# Patient Record
Sex: FEMALE | Race: WHITE | NOT HISPANIC OR LATINO | URBAN - METROPOLITAN AREA
[De-identification: names, ages, dates, MRNs, and addresses within clinical notes are randomized per-mention and may not be internally consistent; named-entity substitution may affect disease eponyms.]

---

## 2017-04-06 ENCOUNTER — EMERGENCY (EMERGENCY)
Facility: HOSPITAL | Age: 57
LOS: 1 days | Discharge: HOME | End: 2017-04-06
Attending: EMERGENCY MEDICINE | Admitting: EMERGENCY MEDICINE
Payer: COMMERCIAL

## 2017-04-06 VITALS
TEMPERATURE: 98 F | DIASTOLIC BLOOD PRESSURE: 93 MMHG | SYSTOLIC BLOOD PRESSURE: 147 MMHG | HEIGHT: 66 IN | WEIGHT: 166.89 LBS | OXYGEN SATURATION: 99 % | HEART RATE: 72 BPM | RESPIRATION RATE: 16 BRPM

## 2017-04-06 VITALS
HEART RATE: 79 BPM | TEMPERATURE: 98 F | SYSTOLIC BLOOD PRESSURE: 153 MMHG | OXYGEN SATURATION: 100 % | RESPIRATION RATE: 16 BRPM | DIASTOLIC BLOOD PRESSURE: 93 MMHG
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
## 2017-04-06 DIAGNOSIS — S50.11XA CONTUSION OF RIGHT FOREARM, INITIAL ENCOUNTER: ICD-10-CM

## 2017-04-06 DIAGNOSIS — S69.91XA UNSPECIFIED INJURY OF RIGHT WRIST, HAND AND FINGER(S), INITIAL ENCOUNTER: ICD-10-CM
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
## 2017-04-06 DIAGNOSIS — Z79.899 OTHER LONG TERM (CURRENT) DRUG THERAPY: ICD-10-CM

## 2017-04-06 DIAGNOSIS — S63.501A UNSPECIFIED SPRAIN OF RIGHT WRIST, INITIAL ENCOUNTER: ICD-10-CM
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
## 2017-04-06 PROCEDURE — 73110 X-RAY EXAM OF WRIST: CPT | Mod: 26,RT

## 2017-04-06 PROCEDURE — 73090 X-RAY EXAM OF FOREARM: CPT | Mod: 26,LT

## 2017-04-06 PROCEDURE — 73070 X-RAY EXAM OF ELBOW: CPT

## 2017-04-06 PROCEDURE — 73110 X-RAY EXAM OF WRIST: CPT

## 2017-04-06 PROCEDURE — 73070 X-RAY EXAM OF ELBOW: CPT | Mod: 26,RT

## 2017-04-06 PROCEDURE — 99284 EMERGENCY DEPT VISIT MOD MDM: CPT | Mod: 25

## 2017-04-06 PROCEDURE — 73090 X-RAY EXAM OF FOREARM: CPT

## 2017-04-06 RX ORDER — ACETAMINOPHEN 500 MG
650 TABLET ORAL ONCE
Qty: 0 | Refills: 0 | Status: COMPLETED | OUTPATIENT
Start: 2017-04-06 | End: 2017-04-06

## 2017-04-06 RX ADMIN — Medication 650 MILLIGRAM(S): at 07:54

## 2017-04-06 RX ADMIN — Medication 650 MILLIGRAM(S): at 09:15

## 2017-04-06 NOTE — ED PROVIDER NOTE - PLAN OF CARE
Healthy 56 y F with h/o gastric bypass had mechanical trip and fall onto outstretched R hand early this morning, isolated pain and swelling to right distal forearm. Plan: pain control, xrays

## 2017-04-06 NOTE — ED ADULT NURSE NOTE - OBJECTIVE STATEMENT
56 year old female patient presents to the ED A&Ox3. Patient complains of discomfort in the lower forearm of her right hand post fall in the middle of the night. Sensory intact, cap refill less than 3 seconds, strong pulses. Right lateral forearm was tender to touch, mild swelling and redness. No abrasion or laceration, obvious deformity.  Pt denies tingling and numbness, N/V/D, HA, dizziness. Pt has no past medical history, does not take daily medications. Pt is ambulatory. Will continue to monitor. 56 year old female patient presents ambulatory to the ED A&Ox3. Patient complains of discomfort in the lower forearm of her right hand post fall in the middle of the night. Sensory intact, cap refill less than 3 seconds, strong pulses. Right lateral forearm was tender to touch, mild swelling and redness. No abrasion or laceration, obvious deformity. Pt has no past medical history, does not take daily medications. Pt is ambulatory. Will continue to monitor. Patient denies SOB and CP, no N/v/D, afebrile in ED, no headache, no lightheadedness/dizziness, no weakness, no abdominal pain or tenderness, no numbness or tingling, no syncope, no cough, no URI.

## 2017-04-06 NOTE — ED PROCEDURE NOTE - NS ED PERI NEURO NEG
Post-application: Motor, sensory, and vascular responses intact in the injured extremity./The patient/caregiver verbalized understanding of how to care for the injured extremity with splint

## 2017-04-06 NOTE — ED PROVIDER NOTE - OBJECTIVE STATEMENT
Patient is 56 y F with PMH gastric bypass  presenting with R wrist pain after tripping over her dog in the middle of the night while getting up to go to bathroom, no head trauma, no LOC, no n/v. Has isolated pain in R wrist, no numbness weakness or tingling.     PMD: Baljit Burgess  ROS: Denies fever, palpitations, chills, recent sickness, HA, vision changes, cough, SOB, chest pain, abdominal pain, n/v/d/c, dysuria, hematuria, rash, sick contacts, and recent travel. Patient is 56 y F with PMH gastric bypass  presenting with R distal forearm pain and bruising after tripping over her dog in the middle of the night while getting up to go to bathroom, no head trauma, no LOC, no n/v. Has isolated pain in R wrist, no numbness weakness or tingling.     PMD: Baljit Burgess  ROS: Denies fever, palpitations, chills, recent sickness, HA, vision changes, cough, SOB, chest pain, abdominal pain, n/v/d/c, dysuria, hematuria, rash, sick contacts, and recent travel.

## 2017-04-06 NOTE — ED PROVIDER NOTE - PROGRESS NOTE DETAILS
ATTD: Dr. Ford - patient endorsed to me by Dr. Conway. pain better. xray with no acute fracture. placed in ACE. patient feels better. instruted to follow up with ortho. instructions for return if worsen given.

## 2017-04-06 NOTE — ED PROVIDER NOTE - CARE PLAN
Instructions for follow-up, activity and diet:	Healthy 56 y F with h/o gastric bypass had mechanical trip and fall onto outstretched R hand early this morning, isolated pain and swelling to right distal forearm. Plan: pain control, xrays Principal Discharge DX:	Wrist sprain, right, initial encounter  Instructions for follow-up, activity and diet:	Healthy 56 y F with h/o gastric bypass had mechanical trip and fall onto outstretched R hand early this morning, isolated pain and swelling to right distal forearm. Plan: pain control, xrays

## 2017-04-06 NOTE — ED PROCEDURE NOTE - NS ED PERI VASCULAR NEG
fingers/toes warm to touch/no cyanosis of extremity/capillary refill time < 2 seconds/no paresthesia/no swelling

## 2017-04-06 NOTE — ED PROVIDER NOTE - PHYSICAL EXAMINATION
Gen: NAD, AOx3  Head: NCAT  HEENT: PERRL, oral mucosa moist, normal conjunctiva  Lung: CTAB, no respiratory distress  CV: rrr, no murmurs, Normal perfusion  Abd: soft, NTND, no CVA tenderness  MSK: No edema, R wrist with scaphoid tenderness, R distal forearm with bruising, swelling and TTP, distal extremities neurovascularly intact with strong pulses  Neuro: No focal neurologic deficits  Skin: No rash   Psych: normal affect

## 2017-04-06 NOTE — ED PROCEDURE NOTE - CPROC ED POST PROC CARE GUIDE1
Instructed patient/caregiver to follow-up with primary care physician./Verbal/written post procedure instructions were given to patient/caregiver./Instructed patient/caregiver regarding signs and symptoms of infection./Elevate the injured extremity as instructed./Keep the cast/splint/dressing clean and dry.

## 2018-12-13 ENCOUNTER — APPOINTMENT (OUTPATIENT)
Dept: ORTHOPEDIC SURGERY | Facility: CLINIC | Age: 58
End: 2018-12-13
Payer: COMMERCIAL

## 2018-12-13 VITALS — DIASTOLIC BLOOD PRESSURE: 82 MMHG | SYSTOLIC BLOOD PRESSURE: 119 MMHG | HEART RATE: 82 BPM

## 2018-12-13 VITALS — HEIGHT: 66 IN | BODY MASS INDEX: 28.93 KG/M2 | WEIGHT: 180 LBS

## 2018-12-13 DIAGNOSIS — M75.81 OTHER SHOULDER LESIONS, RIGHT SHOULDER: ICD-10-CM

## 2018-12-13 DIAGNOSIS — Z78.9 OTHER SPECIFIED HEALTH STATUS: ICD-10-CM

## 2018-12-13 PROCEDURE — 99203 OFFICE O/P NEW LOW 30 MIN: CPT | Mod: 25

## 2018-12-13 PROCEDURE — 73030 X-RAY EXAM OF SHOULDER: CPT | Mod: RT

## 2018-12-13 PROCEDURE — 20610 DRAIN/INJ JOINT/BURSA W/O US: CPT | Mod: RT

## 2018-12-13 RX ORDER — PANTOPRAZOLE SODIUM 20 MG/1
TABLET, DELAYED RELEASE ORAL
Refills: 0 | Status: ACTIVE | COMMUNITY

## 2018-12-13 RX ORDER — ENALAPRIL MALEATE 5 MG/1
TABLET ORAL
Refills: 0 | Status: ACTIVE | COMMUNITY

## 2020-12-03 ENCOUNTER — APPOINTMENT (OUTPATIENT)
Dept: ORTHOPEDIC SURGERY | Facility: CLINIC | Age: 60
End: 2020-12-03
Payer: COMMERCIAL

## 2020-12-03 VITALS — BODY MASS INDEX: 27.48 KG/M2 | HEIGHT: 66 IN | WEIGHT: 171 LBS

## 2020-12-03 DIAGNOSIS — M25.551 PAIN IN RIGHT HIP: ICD-10-CM

## 2020-12-03 DIAGNOSIS — K21.9 GASTRO-ESOPHAGEAL REFLUX DISEASE W/OUT ESOPHAGITIS: ICD-10-CM

## 2020-12-03 DIAGNOSIS — R03.0 ELEVATED BLOOD-PRESSURE READING, W/OUT DIAGNOSIS OF HYPERTENSION: ICD-10-CM

## 2020-12-03 PROCEDURE — 99072 ADDL SUPL MATRL&STAF TM PHE: CPT

## 2020-12-03 PROCEDURE — 20610 DRAIN/INJ JOINT/BURSA W/O US: CPT | Mod: RT

## 2020-12-03 PROCEDURE — 73502 X-RAY EXAM HIP UNI 2-3 VIEWS: CPT | Mod: RT

## 2020-12-03 PROCEDURE — 99214 OFFICE O/P EST MOD 30 MIN: CPT | Mod: 25

## 2020-12-03 NOTE — PHYSICAL EXAM
[de-identified] : Patient is WDWN, alert, and in no acute distress. Breathing is unlabored. She is grossly oriented to person, place, and time. \par \par Right Hip: No discoloration or deformity\par Tender along the greater  trochanter,\par  pain with flexion >90, abduction >60 and adduction >50, pain with internal and external rotation  \par \par Left Hip: Full ROM without pain\par \par Right Shoulder: \par Inspection/ Palpation: there is moderate deltoid tenderness. No discoloration or deformities. \par Range of Motion: limited due to pain:flex 70, abd 60, int rot gluteal area\par Stability: no joint instability on provocative testing \par   [de-identified] : AP and lateral views of the right hip and pelvis were obtained and revealed no abnormalities. No acute fracture. No dislocation. Cartilage spaces are maintained. Phleboliths noted.\par

## 2020-12-03 NOTE — DISCUSSION/SUMMARY
[de-identified] : Dx. Right Trochanteric Bursitis\par \par The patient wishes to proceed with a cortisone injection today. Under sterile precautions, an injection of 5cc 1% lidocaine without epinephrine and 0.5cc Kenalog 40mg, 0.5cc Dexamethasone was administered into the right gluteal insertion on the greater trochanter without complication. The patient tolerated the procedure well.  \par \par Follow Up as needed.

## 2020-12-03 NOTE — END OF VISIT
[FreeTextEntry3] : I, Levi Tatum MD, ordering physician, have read and attest that all the information, medical decision making and discharge instructions within are true and accurate.

## 2020-12-03 NOTE — ADDENDUM
[FreeTextEntry1] : I, Ivonne Mayfield wrote this note acting as a scribe for Dr. Levi Tatum on Dec 03, 2020.

## 2020-12-03 NOTE — HISTORY OF PRESENT ILLNESS
[de-identified] : 58 year old female presents today with right shoulder pain x 2 weeks. She states she twisted her shoulder getting out of the bathtub while on vacation in [place]. Her symptoms were of immediate onset. She developed bruising over lateral humerus and swelling over the right shoulder. She went to urgent care following the injury where she was diagnosed with a possible rotator cuff tear. She reports waking up at night due to pain.She denies numbness. No shoulder pain prior to incident.\par \par She returns c/o right hip pain. She states that she twisted her hip a few weeks ago and has been experiencing pain. She reports taking Tylenol.

## 2020-12-05 ENCOUNTER — EMERGENCY (EMERGENCY)
Facility: HOSPITAL | Age: 60
LOS: 1 days | Discharge: HOME | End: 2020-12-05
Attending: EMERGENCY MEDICINE
Payer: COMMERCIAL

## 2020-12-05 VITALS
RESPIRATION RATE: 20 BRPM | SYSTOLIC BLOOD PRESSURE: 156 MMHG | DIASTOLIC BLOOD PRESSURE: 95 MMHG | TEMPERATURE: 98 F | OXYGEN SATURATION: 99 % | HEART RATE: 79 BPM | WEIGHT: 169.76 LBS | HEIGHT: 66 IN

## 2020-12-05 DIAGNOSIS — Z98.84 BARIATRIC SURGERY STATUS: Chronic | ICD-10-CM

## 2020-12-05 PROCEDURE — 99283 EMERGENCY DEPT VISIT LOW MDM: CPT

## 2020-12-05 RX ORDER — DIAZEPAM 5 MG
5 TABLET ORAL ONCE
Refills: 0 | Status: DISCONTINUED | OUTPATIENT
Start: 2020-12-05 | End: 2020-12-05

## 2020-12-05 RX ORDER — CELECOXIB 200 MG/1
1 CAPSULE ORAL
Qty: 28 | Refills: 0
Start: 2020-12-05 | End: 2020-12-18

## 2020-12-05 RX ORDER — OXYCODONE HYDROCHLORIDE 5 MG/1
5 TABLET ORAL ONCE
Refills: 0 | Status: DISCONTINUED | OUTPATIENT
Start: 2020-12-05 | End: 2020-12-05

## 2020-12-05 RX ORDER — IBUPROFEN 200 MG
600 TABLET ORAL ONCE
Refills: 0 | Status: COMPLETED | OUTPATIENT
Start: 2020-12-05 | End: 2020-12-05

## 2020-12-05 RX ORDER — DIAZEPAM 5 MG
2 TABLET ORAL
Qty: 15 | Refills: 0
Start: 2020-12-05

## 2020-12-05 RX ORDER — LIDOCAINE 4 G/100G
1 CREAM TOPICAL ONCE
Refills: 0 | Status: COMPLETED | OUTPATIENT
Start: 2020-12-05 | End: 2020-12-05

## 2020-12-05 RX ADMIN — OXYCODONE HYDROCHLORIDE 5 MILLIGRAM(S): 5 TABLET ORAL at 11:49

## 2020-12-05 RX ADMIN — Medication 600 MILLIGRAM(S): at 10:23

## 2020-12-05 RX ADMIN — Medication 5 MILLIGRAM(S): at 10:23

## 2020-12-05 RX ADMIN — Medication 600 MILLIGRAM(S): at 10:30

## 2020-12-05 RX ADMIN — LIDOCAINE 1 PATCH: 4 CREAM TOPICAL at 10:22

## 2020-12-05 NOTE — ED PROVIDER NOTE - CONSTITUTIONAL, MLM
normal... Well appearing, awake, alert, oriented to person, place, time/situation and in no apparent distress however sitting uncomfortable in chair.

## 2020-12-05 NOTE — ED PROVIDER NOTE - NSFOLLOWUPINSTRUCTIONS_ED_ALL_ED_FT
IMPORTANT INSTRUCTIONS FROM Dr. RODRIGUEZ:    Please follow up with your personal medical doctor in 24-48 hours.   Bring results from today to your visit.    Lidocaine 4% patch over the counter as needed - 12 hr on - 12 hr off.  Take celecoxib as prescribed for pain.  You can also take valium for muscle relaxing additionally if this is not enough. This will make you drowsy.    Make an appt with the spine center within a few days. Call [phone] for an appt.     If you were advised to take any medications - be sure to review the package insert.    If your symptoms change, get worse or if you have any new symptoms, come to the ER right away.  If you have any questions, call the ER at the phone number on this page.

## 2020-12-05 NOTE — ED PROVIDER NOTE - OBJECTIVE STATEMENT
60 female htn/hld/recent cortisone inj on thurs R hip bursitis now w mod to severe L side lbp cont sharp not better w tyl 975. able to amb. No numbness / tingling / urinary incont or retention / bowel probs / ivdu / fevers. no trauma.

## 2020-12-05 NOTE — ED PROVIDER NOTE - PATIENT PORTAL LINK FT
You can access the FollowMyHealth Patient Portal offered by [facility] by registering at the following website: [email or web address]. By joining [facility]’s FollowMyHealth portal, you will also be able to view your health information using other applications (apps) compatible with our system.

## 2020-12-21 ENCOUNTER — APPOINTMENT (OUTPATIENT)
Dept: ORTHOPEDIC SURGERY | Facility: CLINIC | Age: 60
End: 2020-12-21
Payer: COMMERCIAL

## 2020-12-21 VITALS — BODY MASS INDEX: 27.48 KG/M2 | WEIGHT: 171 LBS | HEIGHT: 66 IN

## 2020-12-21 DIAGNOSIS — M54.5 LOW BACK PAIN: ICD-10-CM

## 2020-12-21 PROCEDURE — 72100 X-RAY EXAM L-S SPINE 2/3 VWS: CPT

## 2020-12-21 PROCEDURE — 99214 OFFICE O/P EST MOD 30 MIN: CPT | Mod: 25

## 2020-12-21 PROCEDURE — 96372 THER/PROPH/DIAG INJ SC/IM: CPT

## 2020-12-21 PROCEDURE — 99072 ADDL SUPL MATRL&STAF TM PHE: CPT

## 2020-12-21 NOTE — DISCUSSION/SUMMARY
[de-identified] : The underlying pathophysiology was reviewed with the patient. Treatment options were discussed including; observation, NSAIDS, analgesics, bracing, injection(s), physical therapy, and surgical intervention. \par \par The patient was given a 30mg toradol injection in her right gluteal area\par She was given a script for a lumbar spine MRI to rule out a pathologic fracture..\par She was advised to call the office after the MRI is done to discuss the results.\par Follow Up  in one week.

## 2020-12-21 NOTE — PHYSICAL EXAM
[de-identified] : Patient is WDWN, alert, and in no acute distress. Breathing is unlabored. She is grossly oriented to person, place, and time. \par \par Right Hip: No discoloration or deformity\par Tender along the greater  trochanter,\par  pain with flexion >90, abduction >60 and adduction >50, pain with internal and external rotation  \par \par Left Hip: Full ROM without pain\par \par Right Shoulder: \par Inspection/ Palpation: there is moderate deltoid tenderness. No discoloration or deformities. \par Range of Motion: limited due to pain:flex 70, abd 60, int rot gluteal area\par Stability: no joint instability on provocative testing \par   [de-identified] : AP and lateral views of the right hip and pelvis were obtained and revealed no abnormalities. No acute fracture. No dislocation. Cartilage spaces are maintained. Phleboliths noted.\par \par Lumbar spine Xrays done today reveal an L1 compression fracture.\par

## 2020-12-21 NOTE — HISTORY OF PRESENT ILLNESS
[de-identified] : 58 year old female presents today with lumbar back pain x2.5 weeks. She reports being unable to get out of bed 2 days her last visit here in which she had a cortisone injection in right trochanteric bursa. Denies numbess/tingling in the toes. Pt went to the [facility] ED 12-05-20 and urgent care in [facility] 12-06-20 for this pain and was diagnosed with muscle spasms. Pt has tried gabapentin, muscle relaxer, and narcotics for pain relief which helped her initially, but do not help anymore. Her pain is worse in the morning, rated 8/10 and after walking around stays at 7/10. \par The pain today is localized over the right and left lumbar paraspinal musculature. She denies any radiating pain or numbness in her lower extremities

## 2020-12-27 ENCOUNTER — TRANSCRIPTION ENCOUNTER (OUTPATIENT)
Age: 60
End: 2020-12-27

## 2020-12-28 RX ORDER — METHYLPREDNISOLONE 4 MG/1
4 TABLET ORAL
Qty: 1 | Refills: 0 | Status: ACTIVE | COMMUNITY
Start: 2020-12-28 | End: 1900-01-01

## 2021-02-04 ENCOUNTER — APPOINTMENT (OUTPATIENT)
Dept: ORTHOPEDIC SURGERY | Facility: CLINIC | Age: 61
End: 2021-02-04
Payer: COMMERCIAL

## 2021-02-04 DIAGNOSIS — M70.61 TROCHANTERIC BURSITIS, RIGHT HIP: ICD-10-CM

## 2021-02-04 DIAGNOSIS — S32.010A WEDGE COMPRESSION FRACTURE OF FIRST LUMBAR VERTEBRA, INITIAL ENCOUNTER FOR CLOSED FRACTURE: ICD-10-CM

## 2021-02-04 PROCEDURE — 99213 OFFICE O/P EST LOW 20 MIN: CPT | Mod: 25

## 2021-02-04 PROCEDURE — 20610 DRAIN/INJ JOINT/BURSA W/O US: CPT | Mod: RT

## 2021-02-04 PROCEDURE — 99072 ADDL SUPL MATRL&STAF TM PHE: CPT

## 2021-02-04 RX ORDER — IBUPROFEN 600 MG/1
600 TABLET, FILM COATED ORAL
Qty: 30 | Refills: 0 | Status: ACTIVE | COMMUNITY
Start: 2021-02-04 | End: 1900-01-01

## 2021-02-05 PROBLEM — M70.61 GREATER TROCHANTERIC BURSITIS OF RIGHT HIP: Status: ACTIVE | Noted: 2020-12-03

## 2021-02-05 NOTE — HISTORY OF PRESENT ILLNESS
[de-identified] : 58 year old female presents today for follow up of lumbar back pain evaluated on 12/21/2020. Pt reports the pain is 7/10, has not improved and has radiated down her legs.\par Pt reports Toradol injection improved symtpoms. Pt has used all her muscle relaxers.\par Pt was unable to get out of bed 2 days after receiving a cortisone injection in right trochanteric bursa on 12/3/2020. Pt went to the [facility] ED 12-05-20 and urgent care in [facility] 12-06-20 for this pain and was diagnosed with muscle spasms.

## 2021-02-05 NOTE — ADDENDUM
[FreeTextEntry1] : I, Ivonne Mayfield, Héctor Leo wrote this note acting as a scribe for Dr. Levi Tatum on Feb 04, 2021.

## 2021-02-05 NOTE — DISCUSSION/SUMMARY
[de-identified] : The underlying pathophysiology was reviewed with the patient. Treatment options were discussed including; observation, NSAIDS, analgesics, bracing, injection(s), physical therapy, and surgical intervention. \par \par MRI imaging was normal.\par Discussed going to physical therapy\par Discussed doing yoga, acupuncture, stretching exercises \par A script Ibuprofen 600mg 30 tablets BID were given. \par The patient wishes to proceed with physical therapy. A script was given for her back.\par \par The patient wishes to proceed with a cortisone injection today. Under sterile precautions, an injection of 5cc 1% lidocaine without epinephrine and 0.5cc Kenalog 40mg, 0.5cc Dexamethasone was administered into the right trochanteric  without complication. The patient tolerated the procedure well. (1)\par

## 2021-02-05 NOTE — PHYSICAL EXAM
[de-identified] : Patient is WDWN, alert, and in no acute distress. Breathing is unlabored. She is grossly oriented to person, place, and time. \par \par Right Hip: No discoloration or deformity\par Tender along the greater  trochanter,\par  pain with flexion >90, abduction >60 and adduction >50, pain with internal and external rotation  \par \par Left Hip: Full ROM without pain\par \par Right Shoulder: \par Inspection/ Palpation: there is moderate deltoid tenderness. No discoloration or deformities. \par Range of Motion: limited due to pain:flex 70, abd 60, int rot gluteal area\par Stability: no joint instability on provocative testing \par   [de-identified] : AP and lateral views of the right hip and pelvis were obtained and revealed no abnormalities. No acute fracture. No dislocation. Cartilage spaces are maintained. Phleboliths noted.\par \par Lumbar spine Xrays revealed an L1 compression fracture.\par MRI bone density exam normal.

## 2021-02-08 RX ORDER — CYCLOBENZAPRINE HYDROCHLORIDE 10 MG/1
10 TABLET, FILM COATED ORAL
Qty: 30 | Refills: 0 | Status: ACTIVE | COMMUNITY
Start: 2020-12-08

## 2021-02-08 RX ORDER — PANTOPRAZOLE 40 MG/1
40 TABLET, DELAYED RELEASE ORAL
Qty: 90 | Refills: 0 | Status: ACTIVE | COMMUNITY
Start: 2020-09-10

## 2021-02-08 RX ORDER — IBUPROFEN 600 MG/1
600 TABLET, FILM COATED ORAL
Qty: 30 | Refills: 0 | Status: ACTIVE | COMMUNITY
Start: 2021-02-08 | End: 1900-01-01

## 2021-02-08 RX ORDER — IBUPROFEN 400 MG/1
400 TABLET, FILM COATED ORAL
Qty: 15 | Refills: 0 | Status: ACTIVE | COMMUNITY
Start: 2020-12-05

## 2021-02-08 RX ORDER — ATORVASTATIN CALCIUM 10 MG/1
10 TABLET, FILM COATED ORAL
Qty: 90 | Refills: 0 | Status: ACTIVE | COMMUNITY
Start: 2020-09-30

## 2021-02-08 RX ORDER — AZITHROMYCIN 250 MG/1
250 TABLET, FILM COATED ORAL
Qty: 6 | Refills: 0 | Status: ACTIVE | COMMUNITY
Start: 2020-11-03

## 2021-02-08 RX ORDER — TRAMADOL HYDROCHLORIDE 50 MG/1
50 TABLET, COATED ORAL
Qty: 10 | Refills: 0 | Status: ACTIVE | COMMUNITY
Start: 2020-12-07

## 2021-02-08 RX ORDER — DIAZEPAM 2 MG/1
2 TABLET ORAL
Qty: 15 | Refills: 0 | Status: ACTIVE | COMMUNITY
Start: 2020-12-05

## 2021-03-10 ENCOUNTER — APPOINTMENT (OUTPATIENT)
Dept: ORTHOPEDIC SURGERY | Facility: CLINIC | Age: 61
End: 2021-03-10
Payer: COMMERCIAL

## 2021-03-10 VITALS
HEIGHT: 66 IN | HEART RATE: 97 BPM | BODY MASS INDEX: 27.32 KG/M2 | DIASTOLIC BLOOD PRESSURE: 86 MMHG | WEIGHT: 170 LBS | SYSTOLIC BLOOD PRESSURE: 135 MMHG

## 2021-03-10 PROCEDURE — 73610 X-RAY EXAM OF ANKLE: CPT | Mod: LT

## 2021-03-10 PROCEDURE — 99214 OFFICE O/P EST MOD 30 MIN: CPT

## 2021-03-10 PROCEDURE — 99072 ADDL SUPL MATRL&STAF TM PHE: CPT

## 2021-03-10 NOTE — ADDENDUM
[FreeTextEntry1] : This note was written by Lela Mann on 03/10/2021 acting solely as a scribe for Dr. Shay Lawson.\par \par All medical record entries made by the Scribe were at my, Dr. Shay Lawson, direction and personally dictated by me on 03/10/2021. I have personally reviewed the chart and agree that the record accurately reflects my personal performance of the history, physical exam, assessment and plan.

## 2021-03-10 NOTE — HISTORY OF PRESENT ILLNESS
[de-identified] : 60 year old female presents today with left ankle injury sustained 3/6/21. She inverted her ankle dancing on Saturday in [place]. She was seen in ER the next day and was told that she had fx in the fibula. She was placed in a splint. Has been nonweightbearing since. Presents with splint and in wheelchair. Taking Oxycodone for pain relief.  Reports occasional numbness and tingling in her foot. \par \par The patient's past medical history, past surgical history, medications and allergies were reviewed by me today with the patient and documented accordingly. In addition, the patient's family and social history, which were noncontributory to this visit, were reviewed also.

## 2021-03-10 NOTE — PHYSICAL EXAM
[de-identified] : Oriented to time, place, person\par Mood: Normal\par Affect: Normal\par Appearance: Healthy, well appearing, no acute distress.\par Gait: Antalgic\par Assistive Devices: wheel chair/splint\par \par Left Ankle exam:\par \par Skin: Clean, dry, intact\par Inspection: No obvious malalignment, +lateral ankle swelling, no effusion, ecchymosis to lateral ankle. \par Pulses: 2+ DP/PT pulses \par ROM: normal degrees of dorsiflexion, normal degrees of plantarflexion, normal subtalar motion.\par Tenderness: ++ tenderness over the lateral malleolus, + CFL/ATFL/PTFL pain. No medial malleolus pain, no deltoid ligament pain. No proximal fibular pain. No heel pain.\par Stability: Negative anterior drawer, negative posterior drawer.\par Strength: In tact TA/GS/EHL \par Neuro: In tact to light touch throughout\par Additional tests: Negative syndesmosis squeeze test.  [de-identified] : The following radiographs were ordered and read by me during this patients visit. I reviewed each radiograph in detail with the patient and discussed the findings as highlighted below. \par \par 3 views of the left ankle were obtained today, 03/10/2021, that show spiral fx of distal fibula.  No disruption of the ankle more T's.  No medial injury.

## 2021-03-10 NOTE — DISCUSSION/SUMMARY
[de-identified] : 59 y/o female with left distal fibula spiral fx. \par \par Patient presents with a stable left ankle injury with fracture of the distal fibula.  The does not appear to be any medial injury, or disruption of the syndesmotic ligaments.  We discussed her injury, and stressing of the injury with weightbearing to see if there is any complex medial injury not seen on x-ray imaging.  Repeat imaging required in the next week to 10 days to determine if there is any shift of the talus. \par \par A discussion was had with the patient regarding basic fracture care. Bony union typically requires 4-6 wks of relative rest and immobilization, and symptoms of pain/swelling typically resolve during this time. Complications of fractures can involve malunion, nonunion, and further displacement that may warrant additional treatment. To avoid these complications, it is recommended for frequent radiographic followup to confirm that the fracture is healing appropriately.  \par \par Recommendation; Elevation of the affected limb, utilize ice (20mins at a time 2-3x daily), OTC pain medication (Tylenol/NSAID's as able), CAM boot immobilization, decreased activity/weight bearing. \par \par Follow up 1 week for repeat xrays and to consider physical therapy.

## 2021-03-18 ENCOUNTER — APPOINTMENT (OUTPATIENT)
Dept: ORTHOPEDIC SURGERY | Facility: CLINIC | Age: 61
End: 2021-03-18
Payer: COMMERCIAL

## 2021-03-18 VITALS — TEMPERATURE: 97.2 F

## 2021-03-18 PROCEDURE — 99072 ADDL SUPL MATRL&STAF TM PHE: CPT

## 2021-03-18 PROCEDURE — 99213 OFFICE O/P EST LOW 20 MIN: CPT

## 2021-03-18 PROCEDURE — 73610 X-RAY EXAM OF ANKLE: CPT | Mod: LT

## 2021-03-18 NOTE — DISCUSSION/SUMMARY
[de-identified] : 61 y/o female with left distal fibula spiral fx. \par \par Patient presents with a stable left ankle injury with fracture of the distal fibula.  There does not appear to be any medial injury, or disruption of the syndesmotic ligament and repeat x-ray shows no medial widening despite trial of weightbearing stress test. We discussed continued use of CAM boot, weightbearing, and beginning physical therapy for range of motion therapy as well as early strengthening/conditioning. \par \par Recommendation; BeginPT, Rx given. Continue use of CAM x~4wks. NSAIDs/Ice PRN. \par \par Follow up 4 weeks for repeat imaging.

## 2021-03-18 NOTE — HISTORY OF PRESENT ILLNESS
[de-identified] : 60 year old female presents today for follow up of left ankle fx sustained 3/6/21. She inverted her ankle dancing in [place]. She has been compliant with CAM boot and ambulating via cane. Taking Ibuprofen for pain relief. C/O swelling.  Denies numbness or tingling.

## 2021-03-18 NOTE — REASON FOR VISIT
[Initial Visit] : an initial visit for [FreeTextEntry2] : Left ankle fx  [Follow-Up Visit] : a follow-up visit for

## 2021-03-18 NOTE — PHYSICAL EXAM
[de-identified] : Oriented to time, place, person\par Mood: Normal\par Affect: Normal\par Appearance: Healthy, well appearing, no acute distress.\par Gait: Antalgic\par Assistive Devices: CAM boot/Cane\par \par Left Ankle exam:\par \par Skin: Clean, dry, intact\par Inspection: No obvious malalignment, +lateral ankle swelling, no effusion, ecchymosis to lateral ankle. \par Pulses: 2+ DP/PT pulses \par ROM: normal degrees of dorsiflexion, normal degrees of plantarflexion, normal subtalar motion.\par Tenderness: ++ tenderness over the lateral malleolus, + CFL/ATFL/PTFL pain. No medial malleolus pain, no deltoid ligament pain. No proximal fibular pain. No heel pain.\par Stability: Negative anterior drawer, negative posterior drawer.\par Strength: In tact TA/GS/EHL \par Neuro: In tact to light touch throughout\par Additional tests: Negative syndesmosis squeeze test.  [de-identified] : The following radiographs were ordered and read by me during this patients visit. I reviewed each radiograph in detail with the patient and discussed the findings as highlighted below. \par \par 3 views of the left ankle were obtained today, 03/18/2021, that show non-displaced spiral fx of distal fibula.  No medial injury. No change since last visit.

## 2021-03-18 NOTE — ADDENDUM
[FreeTextEntry1] : This note was written by Lela Mann on 03/18/2021 acting solely as a scribe for Dr. Shay Lawson.\par \par All medical record entries made by the Scribe were at my, Dr. Shay Lawson, direction and personally dictated by me on 03/18/2021. I have personally reviewed the chart and agree that the record accurately reflects my personal performance of the history, physical exam, assessment and plan.

## 2021-04-15 ENCOUNTER — APPOINTMENT (OUTPATIENT)
Dept: ORTHOPEDIC SURGERY | Facility: CLINIC | Age: 61
End: 2021-04-15
Payer: COMMERCIAL

## 2021-04-15 PROCEDURE — 99213 OFFICE O/P EST LOW 20 MIN: CPT

## 2021-04-15 PROCEDURE — 99072 ADDL SUPL MATRL&STAF TM PHE: CPT

## 2021-04-15 PROCEDURE — 73610 X-RAY EXAM OF ANKLE: CPT | Mod: LT

## 2021-04-23 NOTE — HISTORY OF PRESENT ILLNESS
[de-identified] : 60 year old female presents today for follow up of left lateral malleolus fx sustained 3/6/21. She transitioned into regular shoe last week. Attending PT 2 x per week. She inverted her ankle dancing in [place].  Taking Tylenol for pain relief. C/O swelling.  Denies numbness or tingling.

## 2021-04-23 NOTE — ADDENDUM
[FreeTextEntry1] : This note was written by Lela Mann on 04/15/2021 acting solely as a scribe for Dr. Shay Lawson.\par \par All medical record entries made by the Scribe were at my, Dr. Shay Lawson, direction and personally dictated by me on 04/15/2021. I have personally reviewed the chart and agree that the record accurately reflects my personal performance of the history, physical exam, assessment and plan.

## 2021-04-23 NOTE — DISCUSSION/SUMMARY
[de-identified] : 59 y/o female with left distal fibula spiral fx. \par \par Patient is doing well at this time. She has improved pain and ROM of the ankle under the guidance of physical therapy. Clinically, patient still has mild lateral swelling. We discussed with continue PT and icing the swelling should improve over time. \par \par Recommendation; Continue PT. NSAIDs/Ice PRN.  Gradual return to ADLs.\par \par Follow up 6 weeks for final imaging

## 2021-04-23 NOTE — PHYSICAL EXAM
[de-identified] : Oriented to time, place, person\par Mood: Normal\par Affect: Normal\par Appearance: Healthy, well appearing, no acute distress.\par Gait: normal\par Assistive Devices: none\par \par Left Ankle exam:\par \par Skin: Clean, dry, intact\par Inspection: No obvious malalignment, + residual lateral ankle swelling, no effusion\par Pulses: 2+ DP/PT pulses \par ROM:15 degrees of dorsiflexion, 25 degrees of plantarflexion, normal subtalar motion.\par Tenderness: +min tenderness over the lateral malleolus, no CFL/ATFL/PTFL pain. No medial malleolus pain, no deltoid ligament pain. No proximal fibular pain. No heel pain.\par Stability: Negative anterior drawer, negative posterior drawer.\par Strength: In tact TA/GS/EHL \par Neuro: In tact to light touch throughout\par Additional tests: Negative syndesmosis squeeze test.  [de-identified] : The following radiographs were ordered and read by me during this patients visit. I reviewed each radiograph in detail with the patient and discussed the findings as highlighted below. \par \par 3 views of the left ankle were obtained today, 04/15/2021, that show healed non-displaced spiral fx of distal fibula.  No medial injury. No change since last visit.

## 2021-05-27 ENCOUNTER — APPOINTMENT (OUTPATIENT)
Dept: ORTHOPEDIC SURGERY | Facility: CLINIC | Age: 61
End: 2021-05-27
Payer: COMMERCIAL

## 2021-05-27 VITALS
BODY MASS INDEX: 27.32 KG/M2 | SYSTOLIC BLOOD PRESSURE: 135 MMHG | HEIGHT: 66 IN | DIASTOLIC BLOOD PRESSURE: 86 MMHG | HEART RATE: 97 BPM | WEIGHT: 170 LBS

## 2021-05-27 DIAGNOSIS — S82.65XD NONDISPLACED FRACTURE OF LATERAL MALLEOLUS OF LEFT FIBULA, SUBSEQUENT ENCOUNTER FOR CLOSED FRACTURE WITH ROUTINE HEALING: ICD-10-CM

## 2021-05-27 PROCEDURE — 99213 OFFICE O/P EST LOW 20 MIN: CPT

## 2021-05-27 PROCEDURE — 99072 ADDL SUPL MATRL&STAF TM PHE: CPT

## 2021-05-27 PROCEDURE — 73610 X-RAY EXAM OF ANKLE: CPT | Mod: LT

## 2021-05-27 NOTE — ADDENDUM
[FreeTextEntry1] : This note was written by Lela Mann on 05/27/2021 acting solely as a scribe for Dr. Shay Lawson.\par \par All medical record entries made by the Scribe were at my, Dr. Shay Lawson, direction and personally dictated by me on 05/27/2021. I have personally reviewed the chart and agree that the record accurately reflects my personal performance of the history, physical exam, assessment and plan.

## 2021-05-27 NOTE — HISTORY OF PRESENT ILLNESS
[de-identified] : 60 year old female presents today for follow up of left lateral malleolus fx sustained 3/6/21. She stopped PT last week and continuing those exercises at home. She inverted her ankle dancing in [place].  Reports significant relief of symptoms and pain at this time.  Presenting in sandals.  Taking Tylenol for pain relief. C/O swelling.  Denies pain, numbness or tingling.

## 2021-05-27 NOTE — DISCUSSION/SUMMARY
[de-identified] : 59 y/o female with left distal fibula spiral fx. \par \par Patient is doing well at this time, with improved pain and ROM of the ankle.  Patient denies any significant pain and completed PT last week. She has returned to ADL's with the occasional use of ankle brace. Clinically, patient still has mild lateral swelling. She may return to light impact loading activity with progression to full activity with no restriction.  \par \par Recommendation; NSAIDs/Ice PRN.  Gradual return to ADLs.\par \par Follow up as needed.

## 2021-05-27 NOTE — PHYSICAL EXAM
[de-identified] : Oriented to time, place, person\par Mood: Normal\par Affect: Normal\par Appearance: Healthy, well appearing, no acute distress.\par Gait: normal\par Assistive Devices: none\par \par Left Ankle exam:\par \par Skin: Clean, dry, intact\par Inspection: No obvious malalignment, + mild residual lateral ankle swelling, no effusion\par Pulses: 2+ DP/PT pulses \par ROM:15 degrees of dorsiflexion, 25 degrees of plantarflexion, normal subtalar motion.\par Tenderness: No tenderness over the lateral malleolus, no CFL/ATFL/PTFL pain. No medial malleolus pain, no deltoid ligament pain. No proximal fibular pain. No heel pain.\par Stability: Negative anterior drawer, negative posterior drawer.\par Strength: In tact TA/GS/EHL \par Neuro: In tact to light touch throughout\par Additional tests: Negative syndesmosis squeeze test.  [de-identified] : The following radiographs were ordered and read by me during this patients visit. I reviewed each radiograph in detail with the patient and discussed the findings as highlighted below. \par \par 3 views of the left ankle were obtained today, 05/27/2021, that show healed non-displaced spiral fx of distal fibula.

## 2021-07-29 NOTE — ED ADULT TRIAGE NOTE - SPO2 (%)
[facility] Adult  Hospitalist Group                                                                                          Hospitalist Progress Note  Israel Cr MD  Answering service: [phone] OR [phone] from in house phone        Date of Service:  2021  NAME:  Nj Wheeler  :  1936  MRN:  [id number]    This documentation was facilitated by a Voice Recognition software and may contain inadvertent typographical errors. Admission Summary:   Nj Wheeler is a 82 y.o. female with right breast cancer s/p R axillary lymph node dissection and excision of right breast implant for adenopathy and mass on  with path + for metastatic melanoma, GERD, dyslipidemia, HTN, TIA and hx malignant melanoma who presents with dyspnea and pleuritic chest pain for the past several days. Interval history / Subjective:   No acute overnight events. Patient notes improvement of the chest pain and shortness of breath. Assessment & Plan:   Acute pulmonary emboli  --CT of the lung showed small burden of bilateral acute versus subacute pleural embolism, new multiple subcentimeter trach right lung nodules which may represent metastatic disease.  -Hemodynamically stable, no oxygen requirement, no right heart strain  -Continue Lovenox 1 mg/kg every 12 hourly. Lovenox is preferred anticoagulants in the setting of cancer (metastatic melanoma) however patient is not inclined to continue with injections, so patient will probably for be discharged on NOACs. Hematology/oncology consulted, will await further recommendation. SIRS (fever and tachycardia) possible etiologies include acute PE, hypovolemia. No definitive source of infection.  -after admission, patient developed tachycardia and Tmax 100.8  -Procalcitonin level <0.05. Lactic acid 0.9. Blood culture in process. UA not done, will order.   Would like to obtain culture from right axillary drain.  -Patient was started on empiric antibiotics with vancomycin, Levaquin and tobramycin. DC tobramycin, continue with vancomycin Levaquin for now pending ongoing sepsis work-up. #Hx R breast cancer   #Metastatic melanoma  -consult Dr. Oscar Drake     #HTN  -continue hctz, cardura and metoprolol     #Dyslipidemia  -continue pravastatin and zetia    #Hyponatremia likely due to hypovolemia: Improved with IV fluids. DVT prophylaxis: Full dose Lovenox  MPOA: Nano Rao,   Code: Full     FUNCTIONAL STATUS PRIOR TO HOSPITALIZATION Ambulatory with Use of Assistive Devices     Hospital Problems  Date Reviewed: 7/28/2021        Codes Class Noted POA    Pulmonary emboli [facility]) ICD-10-CM: I26.99  ICD-9-CM: 415.19  7/28/2021 Unknown                Review of Systems:   A comprehensive review of systems was negative except for that written in the HPI. Vital Signs:    Last 24hrs VS reviewed since prior progress note. Most recent are:  Visit Vitals  /65   Pulse 77   Temp 99.3 °F (37.4 °C)   Resp 16   Ht 5' 3\" (1.6 m)   Wt 60.9 kg (134 lb 4.2 oz)   SpO2 95%   BMI 23.78 kg/m²       No intake or output data in the 24 hours ending 07/29/21 1401     Physical Examination:     I had a face to face encounter with this patient and independently examined them on7/29/2021 as outlined below:        Constitutional:  Alert and oriented x3. Not in distress. HEENT:  Atraumatic. Oral mucosa moist,. Non icteric sclera. No pallor. Resp:  CTA bilaterally. No wheezing/rhonchi/rales. No accessory muscle use   Chest Wall:  Right axillary drain in place draining serosanguineous fluid. CV:  Regular rhythm, normal rate, no murmurs, gallops, rubs    GI:  Soft, non distended, non tender.  normoactive bowel sounds, no hepatosplenomegaly    :  No CVA or suprapubic tenderness    Musculoskeletal:  Trace peripheral edema, warm, 2+ pulses throughout    Neurologic:  Mental status:AAOx3,   Cranial nerves II-XII : WNL  Motor exam:Moves all extremities symmetrically Data Review:    Review and/or order of clinical lab test  Review and/or order of tests in the radiology section of CPT  Review and/or order of tests in the medicine section of CPT      Labs:     Recent Labs     07/29/21  0133 07/28/21  1639   WBC 5.9 5.9   HGB 9.1* 9.8*   HCT 27.4* 29.8*   * 463*     Recent Labs     07/29/21  0133 07/28/21  1639   * 131*   K 4.1 4.7    101   CO2 26 25   BUN 11 15   CREA 0.62 0.81   * 112*   CA 9.0 9.8     Recent Labs     07/28/21  1639   ALT 30   AP 73   TBILI 0.3   TP 7.6   ALB 2.7*   GLOB 4.9*     No results for input(s): INR, PTP, APTT, INREXT in the last 72 hours. No results for input(s): FE, TIBC, PSAT, FERR in the last 72 hours. Lab Results   Component Value Date/Time    Folate >24.0 (H) 01/09/2010 03:15 AM      No results for input(s): PH, PCO2, PO2 in the last 72 hours.   Recent Labs     07/28/21  1639   TROIQ <0.05     Lab Results   Component Value Date/Time    Cholesterol, total 150 05/07/2010 07:39 AM    HDL Cholesterol 41 05/07/2010 07:39 AM    LDL, calculated 72.2 05/07/2010 07:39 AM    Triglyceride 184 05/07/2010 07:39 AM    CHOL/HDL Ratio 3.7 05/07/2010 07:39 AM     Lab Results   Component Value Date/Time    Glucose (POC) 158 (H) 05/15/2021 09:50 PM    Glucose (POC) 109 (H) 03/10/2021 02:12 PM     Lab Results   Component Value Date/Time    Color YELLOW/STRAW 05/16/2021 01:11 AM    Appearance CLEAR 05/16/2021 01:11 AM    Specific gravity 1.010 05/16/2021 01:11 AM    Specific gravity <1.005 01/21/2011 04:45 AM    pH (UA) 6.5 05/16/2021 01:11 AM    Protein Negative 05/16/2021 01:11 AM    Glucose Negative 05/16/2021 01:11 AM    Ketone Negative 05/16/2021 01:11 AM    Bilirubin Negative 05/16/2021 01:11 AM    Urobilinogen 0.2 05/16/2021 01:11 AM    Nitrites Negative 05/16/2021 01:11 AM    Leukocyte Esterase TRACE (A) 05/16/2021 01:11 AM    Epithelial cells FEW 05/16/2021 01:11 AM    Bacteria 1+ (A) 05/16/2021 01:11 AM    WBC 5-10 05/16/2021 01:11 AM    RBC 0-5 05/16/2021 01:11 AM         Medications Reviewed:     Current Facility-Administered Medications   Medication Dose Route Frequency    sodium chloride (NS) flush 5-10 mL  5-10 mL IntraVENous PRN    levoFLOXacin (LEVAQUIN) 750 mg in D5W IVPB  750 mg IntraVENous Q24H    sodium chloride 0.9 % bolus infusion 827 mL  827 mL IntraVENous ONCE    Vancomycin dosing per pharmacy   Other Rx Dosing/Monitoring    [START ON 7/30/2021] vancomycin (VANCOCIN) 1250 mg in  ml infusion  1,250 mg IntraVENous Q24H    Tobramycin dosing per pharmacy   Other Rx Dosing/Monitoring    enoxaparin (LOVENOX) injection 60 mg  1 mg/kg SubCUTAneous Q12H    Tobramycin random TODAY @ 1800   Other ONCE    acetaminophen (TYLENOL) tablet 650 mg  650 mg Oral Q6H PRN    Or    acetaminophen (TYLENOL) suppository 650 mg  650 mg Rectal Q6H PRN    ezetimibe (ZETIA) tablet 10 mg  10 mg Oral DAILY    doxazosin (CARDURA) tablet 2 mg  2 mg Oral DAILY    hydroCHLOROthiazide (HYDRODIURIL) tablet 25 mg  25 mg Oral DAILY    metoprolol tartrate (LOPRESSOR) tablet 50 mg  50 mg Oral BID    aspirin delayed-release tablet 81 mg  81 mg Oral DAILY    pravastatin (PRAVACHOL) tablet 20 mg  20 mg Oral QHS     ______________________________________________________________________  EXPECTED LENGTH OF STAY: 4d 19h  ACTUAL LENGTH OF STAY:          1                 Ricco Murphy MD 99

## 2021-10-04 PROBLEM — Z86.79 PERSONAL HISTORY OF OTHER DISEASES OF THE CIRCULATORY SYSTEM: Chronic | Status: ACTIVE | Noted: 2020-12-05

## 2021-10-04 PROBLEM — E78.00 PURE HYPERCHOLESTEROLEMIA, UNSPECIFIED: Chronic | Status: ACTIVE | Noted: 2020-12-05

## 2021-10-05 ENCOUNTER — APPOINTMENT (OUTPATIENT)
Dept: ORTHOPEDIC SURGERY | Facility: CLINIC | Age: 61
End: 2021-10-05
Payer: COMMERCIAL

## 2021-10-05 VITALS — WEIGHT: 170 LBS | BODY MASS INDEX: 27.32 KG/M2 | HEIGHT: 66 IN

## 2021-10-05 DIAGNOSIS — M75.41 IMPINGEMENT SYNDROME OF RIGHT SHOULDER: ICD-10-CM

## 2021-10-05 PROCEDURE — 73030 X-RAY EXAM OF SHOULDER: CPT | Mod: RT

## 2021-10-05 PROCEDURE — 20610 DRAIN/INJ JOINT/BURSA W/O US: CPT | Mod: RT

## 2021-10-05 PROCEDURE — 99214 OFFICE O/P EST MOD 30 MIN: CPT | Mod: 25

## 2021-10-07 PROBLEM — M75.41 IMPINGEMENT SYNDROME OF RIGHT SHOULDER: Status: ACTIVE | Noted: 2021-10-07

## 2021-10-07 NOTE — HISTORY OF PRESENT ILLNESS
[de-identified] : 61 year old RHD female presents today with right shoulder pain x 4 weeks. No injury reported. She thinks she may have "slept on it weird". The pain is constant worse with overhead movements and  internal rotation. Tylenol is minimally helpful. Stretching is not helpful. Denies numbness or tingling. She has not has any issues of the shoulder in the past.

## 2021-10-07 NOTE — PHYSICAL EXAM
[de-identified] : Oriented to time, place, person\par Mood: Normal\par Affect: Normal\par Appearance: Healthy, well appearing, no acute distress.\par Gait: Normal\par Assistive Devices: None\par \par Right shoulder exam:\par \par Inspection: No malalignment, No defects, No atrophy\par Skin: No masses, No lesions\par Neck: Negative Spurling, full ROM, no pain with ROM\par AROM: FF to 150, abduction to 90, ER to 70, IR to upper lumbar\par Painful arc ROM: Pain with FF\par Tenderness: No bicipital tenderness, no tenderness to greater tuberosity/RTC insertion, no anterior shoulder/lesser tuberosity tenderness\par Strength: 5/5 ER, 5/5 IR in adduction, 4/5 supraspinatus testing, negative [place]'s test\par AC joint: No TTP/pain with cross arm testing\par Biceps: Speed Negative, Yergason Negative \par Impingement test:+Marcos, + Neer\par Vasc: 2+ radial pulse \par Stability: Stable \par Neuro: AIN, PIN, Ulnar nerve intact to motor\par Sensation: Intact to light touch throughout  [de-identified] : The following radiographs were ordered and read by me during this patients visit. I reviewed each radiograph in detail with the patient and discussed the findings as highlighted below.\par \par 3 views of right shoulder were obtained today, 10/05/2021, that show no acute fracture or dislocation. There is mild glenohumeral and mild AC joint degenerative change seen. Type II acromion. There is no significant malalignment. No significant other obvious osseous abnormality, otherwise unremarkable.

## 2021-10-07 NOTE — DISCUSSION/SUMMARY
[de-identified] : 62 y/o female with right shoulder impingement\par \par A discussion was had with the patient regarding potential sources of inflammatory shoulder discomfort; including rotator cuff tendon dysfunction (including tendonitis vs. internal structural damage), subdeltoid/subacromial bursitis, or impingement of the rotator cuff at the acromion. Other contributing sources of pain may be the acromioclavicular joint or long head of the biceps tendon. Irritation is typically caused either by overhead repetitive activity or as a result of minor injury. \par \par Discussed short-term and long-term outcomes as well as the goal of treatment to reduce pain and restore function. Nonsurgical treatment is typically first-line therapy that may take weeks to months to resolve symptoms; includes rest from overhead activities, NSAIDs, home exercise program versus physical therapy to restore normal strength/ROM/function of the shoulder, and possible corticosteroid injection. Also discussed the role of arthroscopic surgical intervention when nonsurgical treatment does not adequately relieve pain/inflammation. \par \par She is unable to tolerate NSAIDs due to gastric sleeve sx, therefore injection therapy was provided for therapeutic and symptomatic relief. \par \par Recommendations: Begin trial of PT, Rx given. Conservative modalities as above (overhead activity rest/activity avoidance until less symptomatic, ice, NSAIDs if able, home exercise strengthening and stretching program). \par \par Followup: If symptoms progress or persist for additional treatment as needed

## 2021-10-07 NOTE — PROCEDURE
[de-identified] : Injection: Right shoulder (Subacromial).\par Indication: Impingement. \par \par A discussion was had with the patient regarding this procedure and all questions were answered. All risks, benefits and alternatives were discussed. These included but were not limited to bleeding, infection, and allergic reaction. Alcohol was used to clean the skin, and betadine was used to sterilize and prep the area in the posterior aspect of the right shoulder. Ethyl chloride spray was then used as a topical anesthetic. A 21-gauge needle was used to inject 4cc of 1% lidocaine and 1cc of 40mg/ml methylprednisolone into the right subacromial space. A sterile bandage was then applied. The patient tolerated the procedure well and there were no complications.

## 2021-10-07 NOTE — ADDENDUM
[FreeTextEntry1] : This note was written by Lela Mann on 10/05/2021 acting solely as a scribe for Dr. Shay Lawson.\par \par All medical record entries made by the Scribe were at my, Dr. Shay Lawson, direction and personally dictated by me on 10/05/2021. I have personally reviewed the chart and agree that the record accurately reflects my personal performance of the history, physical exam, assessment and plan.

## 2022-06-14 ENCOUNTER — APPOINTMENT (OUTPATIENT)
Dept: ORTHOPEDIC SURGERY | Facility: CLINIC | Age: 62
End: 2022-06-14
Payer: COMMERCIAL

## 2022-06-14 PROCEDURE — 99203 OFFICE O/P NEW LOW 30 MIN: CPT

## 2022-06-14 PROCEDURE — L4361: CPT

## 2022-06-14 PROCEDURE — 73590 X-RAY EXAM OF LOWER LEG: CPT | Mod: LT

## 2022-06-14 NOTE — PHYSICAL EXAM
[de-identified] : General: Well-nourished, well developed female in no apparent distress\par Psych: Clear speech, pleasant mood and affect\par Neurological: Alert and oriented to person, place, and time\par Gait: Normal\par Respiratory: Normal respiratory effort\par Skin: No rashes, no lesions, no open skin, no ecchymosis, no erythema\par \par \par Inspection: No swelling, ecchymosis or redness noted.\par \par Alignment: Hindfoot alignment in anatomic valgus, neutral midfoot alignment.\par  \par Palpation: No anterior medial, central or lateral ankle joint line tenderness. No posterior medial or lateral ankle pain. No pain over proximal tibia. She is tender over the midshaft of the tibia.  No pain over the midshaft or distal fibula. Leg Compartments are soft and nontender. Calf is soft and non-tender with a down-going Sewell test. No pain over the lateral and medial malleolus. No pain over ATFL and CFL. Non-tender over the deltoid ligament or proximal and distal syndesmosis. Negative squeeze test of the syndesmosis. Non-tender over the fibula head or neck. Non-tender over the sinus tarsi.\par No pain over the course of the achilles, peroneal, posterior tibial, anterior tibial or the extensor tendons. \par On examination of the foot: Foot compartments are soft and nontender. No pain over the heel or plantar fascia. No tenderness over the transverse tarsal joints, TMT or the Lisfranc joints on palpation and stress examination. No tenderness over the navicular, cuboid, cuneiforms, or metatarsals shafts. No pain beneath the metatarsal heads. Full range of motion through the MTP joints. The toes are reduced and well aligned. No pain on examination of the toes, and no webspace tenderness noted\par \par ROM: 15-20 degrees of dorsiflexion, 40 degrees of plantar flexion, 20 degrees of inversion and 20 degrees of eversion without pain. Able to flex and extend all toes without pain \par \par Muscle Strength: 5/5 strength with resisted dorsiflexion, plantar flexion, inversion and eversion without any pain.\par \par Stability: No instability or laxity noted with varus/valgus stress. Negative anterior and posterior drawer test. No pain with dorsiflexion external rotation stress test.\par \par Neurologic Exam : Sensation is grossly intact bilateral upper and lower extremities to light touch throughout. Sensation intact to the sural, posterior tibial, superficial peroneal nerve. 2+ patellar tendon and Achilles tendon reflexes are noted. There is a downgoing Babinski test. No clonus is noted bilaterally.\par \par Vascular: Arterial Pulses right and Left: posterior tibialis normal and dorsalis pedis normal. Right and Left: no edema, no varicosities and brisk capillary refill test normal.\par \par Radiographs: X-rays done 2 views of the tibia and fibula which reveals thickening of the cortices of the midshaft tibia, small ossification between the tibia and fibula\par

## 2022-06-14 NOTE — ASSESSMENT
[FreeTextEntry1] : After her examination today in the office and review of her radiographs I do feel that she does have thickening of the cortices of the mid shaft of the tibia which I would like to further evaluate with a MRI to rule out an occult fracture or nauseous lesion or soft tissue lesion around the leg that can be causing her leg pain as well as the osseous changes of the tibia. I did recommend immobilization and protection of the leg with wearing a Cam Walker boot which she already has at home from a previous contralateral ankle injury. I would like her to rest the left lower extremity end to elevate the left lower extremity and to decrease the amount of walking and standing and refrain from any strenuous running and jumping type of activities. I would like to see her back right after the MRI is completed for review of her MRI findings

## 2022-06-14 NOTE — HISTORY OF PRESENT ILLNESS
[Gradual] : gradual [6] : 6 [Dull/Aching] : dull/aching [Throbbing] : throbbing [Standing] : standing [Walking] : walking [Exercising] : exercising [Stairs] : stairs [Full time] : Work status: full time [de-identified] : Ms. SCHMIDT is a 61 year female who presents today for evaluation of their Left leg pain and swelling. She states that over the past three to four days she has been having diffuse pain in her left leg as well as swelling in the center portion of her left leg she denies any calf pain or any shortness of breath or any symptoms of numbness tingling or burning. The pain is worse when she stands for long periods of time or walks long distances. She denies any feeling of tightness in the leg she denies any history of trauma or injury or any increase in her physical activities. She denies any fever or chills weight loss or weight gain [] : no [FreeTextEntry5] : Ai is here today for LT Kaminski pain.\par Been going on for a few days.\par Went to  Yesterday and took x-rays.\par Small swelling in the shin.\par When walking the shin hurts a lot.\par Did not have a fall or accident. 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

## 2022-06-19 ENCOUNTER — FORM ENCOUNTER (OUTPATIENT)
Age: 62
End: 2022-06-19

## 2022-06-20 ENCOUNTER — APPOINTMENT (OUTPATIENT)
Dept: MRI IMAGING | Facility: CLINIC | Age: 62
End: 2022-06-20
Payer: COMMERCIAL

## 2022-06-20 PROCEDURE — 73718 MRI LOWER EXTREMITY W/O DYE: CPT | Mod: LT

## 2022-06-21 ENCOUNTER — TRANSCRIPTION ENCOUNTER (OUTPATIENT)
Age: 62
End: 2022-06-21

## 2022-06-23 ENCOUNTER — APPOINTMENT (OUTPATIENT)
Dept: ORTHOPEDIC SURGERY | Facility: CLINIC | Age: 62
End: 2022-06-23
Payer: COMMERCIAL

## 2022-06-23 DIAGNOSIS — M79.605 PAIN IN LEFT LEG: ICD-10-CM

## 2022-06-23 PROCEDURE — 99213 OFFICE O/P EST LOW 20 MIN: CPT

## 2022-06-23 NOTE — HISTORY OF PRESENT ILLNESS
[de-identified] : Is here for follow-up of her left leg pain and swelling.  She states that overall she has started to notice some mild improvement with resting as well as using the cam walker boot for all walking and standing.  She still notices pain and sensitivity over the front mid to distal aspect of her leg.  There is pain with pressure over this area.  She denies any nighttime pain fever chills weight loss or weight gain.  She has had an MRI of her tibia which reveals a 3 mm lesion within the anterior lateral cortex of the tibia without any reactive marrow edema [FreeTextEntry5] : Ai is here today to F/U on her MRI.\par Wearing the cam walker.\par At night it throbs and she tries to elevate it during work.\par More she stays off of it the better it feels.\par

## 2022-06-23 NOTE — PHYSICAL EXAM
[de-identified] : General: Well-nourished, well developed female in no apparent distress\par Psych: Clear speech, pleasant mood and affect\par Neurological: Alert and oriented to person, place, and time\par Gait: Antalgic\par Respiratory: Normal respiratory effort\par Skin: No rashes, no lesions, no open skin, no ecchymosis, no erythema\par \par \par Inspection: No swelling, ecchymosis or redness noted.\par \par Alignment: Hindfoot alignment in anatomic valgus, neutral midfoot alignment.\par  \par Palpation: No anterior medial, central or lateral ankle joint line tenderness. No posterior medial or lateral ankle pain. No pain over proximal tibia. She is tender over the midshaft of the tibia.  No pain over the midshaft or distal fibula. Leg Compartments are soft and nontender. Calf is soft and non-tender with a down-going Sewell test. No pain over the lateral and medial malleolus. No pain over ATFL and CFL. Non-tender over the deltoid ligament or proximal and distal syndesmosis. Negative squeeze test of the syndesmosis. Non-tender over the fibula head or neck. Non-tender over the sinus tarsi.\par No pain over the course of the achilles, peroneal, posterior tibial, anterior tibial or the extensor tendons. \par On examination of the foot: Foot compartments are soft and nontender. No pain over the heel or plantar fascia. No tenderness over the transverse tarsal joints, TMT or the Lisfranc joints on palpation and stress examination. No tenderness over the navicular, cuboid, cuneiforms, or metatarsals shafts. No pain beneath the metatarsal heads. Full range of motion through the MTP joints. The toes are reduced and well aligned. No pain on examination of the toes, and no webspace tenderness noted\par \par ROM: 15-20 degrees of dorsiflexion, 40 degrees of plantar flexion, 20 degrees of inversion and 20 degrees of eversion without pain. Able to flex and extend all toes without pain \par \par Muscle Strength: 5/5 strength with resisted dorsiflexion, plantar flexion, inversion and eversion without any pain.\par \par Stability: No instability or laxity noted with varus/valgus stress. Negative anterior and posterior drawer test. No pain with dorsiflexion external rotation stress test.\par \par Neurologic Exam : Sensation is grossly intact bilateral upper and lower extremities to light touch throughout. Sensation intact to the sural, posterior tibial, superficial peroneal nerve. 2+ patellar tendon and Achilles tendon reflexes are noted. There is a downgoing Babinski test. No clonus is noted bilaterally.\par \par Vascular: Arterial Pulses right and Left: posterior tibialis normal and dorsalis pedis normal. Right and Left: no edema, no varicosities and brisk capillary refill test normal.\par \par \par

## 2022-06-23 NOTE — ASSESSMENT
[FreeTextEntry1] : After her examination today in the office and review of her radiographs as well as her MRI there is a small finding/lesion within the anterior lateral cortex of the tibia over the area where she is painful and sensitive and I would like her to be seen by an orthopedic oncologist as I would recommend a MRI with and without contrast to make sure there is no concerning lesion of the tibia that can be causing her pain or that needs to be addressed or biopsy.  I have given her an excellent recommendation of an orthopedic oncologist to be seen and to be evaluated and consulted and further worked up.  Since she has noticed some improvement with decreasing pain with her current treatment I would recommend that she continue to remain protected in the cam walker boot when she is up and standing and walking and limit her walking and standing and to remove the boot when she is sitting and resting to perform range of motion exercises and to use local heat and ice therapy.  She states that she will call to make an appointment with the orthopedic oncologist

## 2022-06-30 ENCOUNTER — APPOINTMENT (OUTPATIENT)
Dept: ORTHOPEDIC SURGERY | Facility: CLINIC | Age: 62
End: 2022-06-30

## 2022-06-30 VITALS
WEIGHT: 179 LBS | DIASTOLIC BLOOD PRESSURE: 80 MMHG | HEIGHT: 66 IN | BODY MASS INDEX: 28.77 KG/M2 | SYSTOLIC BLOOD PRESSURE: 117 MMHG | OXYGEN SATURATION: 98 % | HEART RATE: 91 BPM

## 2022-06-30 DIAGNOSIS — M89.9 DISORDER OF BONE, UNSPECIFIED: ICD-10-CM

## 2022-06-30 PROCEDURE — 99213 OFFICE O/P EST LOW 20 MIN: CPT

## 2022-06-30 RX ORDER — MELOXICAM 15 MG/1
15 TABLET ORAL
Qty: 30 | Refills: 1 | Status: ACTIVE | COMMUNITY
Start: 2022-06-30 | End: 1900-01-01

## 2022-07-18 ENCOUNTER — OUTPATIENT (OUTPATIENT)
Dept: OUTPATIENT SERVICES | Facility: HOSPITAL | Age: 62
LOS: 1 days | End: 2022-07-18
Payer: COMMERCIAL

## 2022-07-18 ENCOUNTER — APPOINTMENT (OUTPATIENT)
Dept: CT IMAGING | Facility: HOSPITAL | Age: 62
End: 2022-07-18

## 2022-07-18 DIAGNOSIS — Z98.84 BARIATRIC SURGERY STATUS: Chronic | ICD-10-CM

## 2022-07-18 DIAGNOSIS — M89.9 DISORDER OF BONE, UNSPECIFIED: ICD-10-CM

## 2022-07-18 PROCEDURE — 73702 CT LWR EXTREMITY W/O&W/DYE: CPT | Mod: 26,LT

## 2022-07-18 PROCEDURE — 73702 CT LWR EXTREMITY W/O&W/DYE: CPT

## 2022-07-21 ENCOUNTER — TRANSCRIPTION ENCOUNTER (OUTPATIENT)
Age: 62
End: 2022-07-21

## 2022-07-28 ENCOUNTER — NON-APPOINTMENT (OUTPATIENT)
Age: 62
End: 2022-07-28

## 2023-02-08 ENCOUNTER — APPOINTMENT (OUTPATIENT)
Dept: ORTHOPEDIC SURGERY | Facility: CLINIC | Age: 63
End: 2023-02-08

## 2023-02-15 ENCOUNTER — APPOINTMENT (OUTPATIENT)
Dept: ORTHOPEDIC SURGERY | Facility: CLINIC | Age: 63
End: 2023-02-15
Payer: COMMERCIAL

## 2023-02-15 VITALS — WEIGHT: 131 LBS | HEIGHT: 65 IN | BODY MASS INDEX: 21.83 KG/M2

## 2023-02-15 DIAGNOSIS — M70.72 OTHER BURSITIS OF HIP, LEFT HIP: ICD-10-CM

## 2023-02-15 PROCEDURE — 99214 OFFICE O/P EST MOD 30 MIN: CPT

## 2023-02-15 PROCEDURE — 73502 X-RAY EXAM HIP UNI 2-3 VIEWS: CPT | Mod: LT

## 2023-02-15 PROCEDURE — 73030 X-RAY EXAM OF SHOULDER: CPT | Mod: RT

## 2023-03-29 ENCOUNTER — APPOINTMENT (OUTPATIENT)
Dept: ORTHOPEDIC SURGERY | Facility: CLINIC | Age: 63
End: 2023-03-29

## 2023-08-22 NOTE — ED PROVIDER NOTE - CLINICAL SUMMARY MEDICAL DECISION MAKING FREE TEXT BOX
Rehabilitation services
lbp. pt has private orthopedics already. no red flags right now. dw pt home treatment/return precautions. will trial and meds and likely dc.

## 2024-03-08 ENCOUNTER — EMERGENCY (EMERGENCY)
Facility: HOSPITAL | Age: 64
LOS: 1 days | Discharge: ANOTHER HOSPITAL | End: 2024-03-08
Attending: STUDENT IN AN ORGANIZED HEALTH CARE EDUCATION/TRAINING PROGRAM | Admitting: STUDENT IN AN ORGANIZED HEALTH CARE EDUCATION/TRAINING PROGRAM
Payer: COMMERCIAL

## 2024-03-08 ENCOUNTER — INPATIENT (INPATIENT)
Facility: HOSPITAL | Age: 64
LOS: 7 days | Discharge: HOME | DRG: 29 | End: 2024-03-16
Attending: GENERAL PRACTICE | Admitting: GENERAL PRACTICE
Payer: COMMERCIAL

## 2024-03-08 ENCOUNTER — RESULT REVIEW (OUTPATIENT)
Age: 64
End: 2024-03-08

## 2024-03-08 ENCOUNTER — TRANSCRIPTION ENCOUNTER (OUTPATIENT)
Age: 64
End: 2024-03-08

## 2024-03-08 VITALS
HEIGHT: 64 IN | DIASTOLIC BLOOD PRESSURE: 90 MMHG | TEMPERATURE: 98 F | OXYGEN SATURATION: 99 % | HEART RATE: 93 BPM | RESPIRATION RATE: 20 BRPM | WEIGHT: 153 LBS | SYSTOLIC BLOOD PRESSURE: 152 MMHG

## 2024-03-08 VITALS
DIASTOLIC BLOOD PRESSURE: 71 MMHG | HEIGHT: 64 IN | RESPIRATION RATE: 18 BRPM | TEMPERATURE: 98 F | OXYGEN SATURATION: 95 % | SYSTOLIC BLOOD PRESSURE: 126 MMHG | HEART RATE: 109 BPM

## 2024-03-08 VITALS
TEMPERATURE: 98 F | RESPIRATION RATE: 18 BRPM | OXYGEN SATURATION: 98 % | DIASTOLIC BLOOD PRESSURE: 58 MMHG | SYSTOLIC BLOOD PRESSURE: 121 MMHG | HEART RATE: 104 BPM

## 2024-03-08 DIAGNOSIS — Z98.84 BARIATRIC SURGERY STATUS: Chronic | ICD-10-CM

## 2024-03-08 DIAGNOSIS — M48.02 SPINAL STENOSIS, CERVICAL REGION: ICD-10-CM

## 2024-03-08 DIAGNOSIS — R55 SYNCOPE AND COLLAPSE: ICD-10-CM

## 2024-03-08 DIAGNOSIS — E78.5 HYPERLIPIDEMIA, UNSPECIFIED: ICD-10-CM

## 2024-03-08 DIAGNOSIS — K21.9 GASTRO-ESOPHAGEAL REFLUX DISEASE WITHOUT ESOPHAGITIS: ICD-10-CM

## 2024-03-08 DIAGNOSIS — S12.9XXA FRACTURE OF NECK, UNSPECIFIED, INITIAL ENCOUNTER: ICD-10-CM

## 2024-03-08 DIAGNOSIS — I10 ESSENTIAL (PRIMARY) HYPERTENSION: ICD-10-CM

## 2024-03-08 DIAGNOSIS — R52 PAIN, UNSPECIFIED: ICD-10-CM

## 2024-03-08 LAB
ALBUMIN SERPL ELPH-MCNC: 3.6 G/DL — SIGNIFICANT CHANGE UP (ref 3.3–5)
ALBUMIN SERPL ELPH-MCNC: 4 G/DL — SIGNIFICANT CHANGE UP (ref 3.3–5)
ALBUMIN SERPL ELPH-MCNC: 4.1 G/DL — SIGNIFICANT CHANGE UP (ref 3.3–5)
ALP SERPL-CCNC: 100 U/L — SIGNIFICANT CHANGE UP (ref 40–120)
ALP SERPL-CCNC: 86 U/L — SIGNIFICANT CHANGE UP (ref 30–120)
ALP SERPL-CCNC: 92 U/L — SIGNIFICANT CHANGE UP (ref 40–120)
ALT FLD-CCNC: 19 U/L — SIGNIFICANT CHANGE UP (ref 10–45)
ALT FLD-CCNC: 19 U/L — SIGNIFICANT CHANGE UP (ref 10–45)
ALT FLD-CCNC: 26 U/L — SIGNIFICANT CHANGE UP (ref 10–60)
ANION GAP SERPL CALC-SCNC: 14 MMOL/L — SIGNIFICANT CHANGE UP (ref 5–17)
ANION GAP SERPL CALC-SCNC: 16 MMOL/L — SIGNIFICANT CHANGE UP (ref 5–17)
ANION GAP SERPL CALC-SCNC: 17 MMOL/L — SIGNIFICANT CHANGE UP (ref 5–17)
APPEARANCE UR: CLEAR — SIGNIFICANT CHANGE UP
APTT BLD: 27.4 SEC — SIGNIFICANT CHANGE UP (ref 24.5–35.6)
APTT BLD: 30 SEC — SIGNIFICANT CHANGE UP (ref 24.5–35.6)
AST SERPL-CCNC: 36 U/L — SIGNIFICANT CHANGE UP (ref 10–40)
AST SERPL-CCNC: 38 U/L — SIGNIFICANT CHANGE UP (ref 10–40)
AST SERPL-CCNC: 39 U/L — SIGNIFICANT CHANGE UP (ref 10–40)
BASE EXCESS BLDV CALC-SCNC: -3.4 MMOL/L — LOW (ref -2–3)
BASOPHILS # BLD AUTO: 0.05 K/UL — SIGNIFICANT CHANGE UP (ref 0–0.2)
BASOPHILS # BLD AUTO: 0.05 K/UL — SIGNIFICANT CHANGE UP (ref 0–0.2)
BASOPHILS NFR BLD AUTO: 0.5 % — SIGNIFICANT CHANGE UP (ref 0–2)
BASOPHILS NFR BLD AUTO: 0.7 % — SIGNIFICANT CHANGE UP (ref 0–2)
BILIRUB SERPL-MCNC: 0.5 MG/DL — SIGNIFICANT CHANGE UP (ref 0.2–1.2)
BILIRUB SERPL-MCNC: 0.7 MG/DL — SIGNIFICANT CHANGE UP (ref 0.2–1.2)
BILIRUB SERPL-MCNC: 0.7 MG/DL — SIGNIFICANT CHANGE UP (ref 0.2–1.2)
BILIRUB UR-MCNC: NEGATIVE — SIGNIFICANT CHANGE UP
BLD GP AB SCN SERPL QL: NEGATIVE — SIGNIFICANT CHANGE UP
BUN SERPL-MCNC: 15 MG/DL — SIGNIFICANT CHANGE UP (ref 7–23)
BUN SERPL-MCNC: 17 MG/DL — SIGNIFICANT CHANGE UP (ref 7–23)
BUN SERPL-MCNC: 18 MG/DL — SIGNIFICANT CHANGE UP (ref 7–23)
CA-I SERPL-SCNC: 1.2 MMOL/L — SIGNIFICANT CHANGE UP (ref 1.15–1.33)
CALCIUM SERPL-MCNC: 9 MG/DL — SIGNIFICANT CHANGE UP (ref 8.4–10.5)
CALCIUM SERPL-MCNC: 9.3 MG/DL — SIGNIFICANT CHANGE UP (ref 8.4–10.5)
CALCIUM SERPL-MCNC: 9.3 MG/DL — SIGNIFICANT CHANGE UP (ref 8.4–10.5)
CHLORIDE BLDV-SCNC: 102 MMOL/L — SIGNIFICANT CHANGE UP (ref 96–108)
CHLORIDE SERPL-SCNC: 100 MMOL/L — SIGNIFICANT CHANGE UP (ref 96–108)
CHLORIDE SERPL-SCNC: 104 MMOL/L — SIGNIFICANT CHANGE UP (ref 96–108)
CHLORIDE SERPL-SCNC: 104 MMOL/L — SIGNIFICANT CHANGE UP (ref 96–108)
CK SERPL-CCNC: 291 U/L — HIGH (ref 25–170)
CK SERPL-CCNC: 304 U/L — HIGH (ref 26–192)
CO2 BLDV-SCNC: 22 MMOL/L — SIGNIFICANT CHANGE UP (ref 22–26)
CO2 SERPL-SCNC: 21 MMOL/L — LOW (ref 22–31)
CO2 SERPL-SCNC: 21 MMOL/L — LOW (ref 22–31)
CO2 SERPL-SCNC: 24 MMOL/L — SIGNIFICANT CHANGE UP (ref 22–31)
COLOR SPEC: YELLOW — SIGNIFICANT CHANGE UP
CREAT SERPL-MCNC: 0.62 MG/DL — SIGNIFICANT CHANGE UP (ref 0.5–1.3)
CREAT SERPL-MCNC: 0.76 MG/DL — SIGNIFICANT CHANGE UP (ref 0.5–1.3)
CREAT SERPL-MCNC: 0.76 MG/DL — SIGNIFICANT CHANGE UP (ref 0.5–1.3)
DIFF PNL FLD: NEGATIVE — SIGNIFICANT CHANGE UP
EGFR: 101 ML/MIN/1.73M2 — SIGNIFICANT CHANGE UP
EGFR: 89 ML/MIN/1.73M2 — SIGNIFICANT CHANGE UP
EGFR: 89 ML/MIN/1.73M2 — SIGNIFICANT CHANGE UP
EOSINOPHIL # BLD AUTO: 0 K/UL — SIGNIFICANT CHANGE UP (ref 0–0.5)
EOSINOPHIL # BLD AUTO: 0.01 K/UL — SIGNIFICANT CHANGE UP (ref 0–0.5)
EOSINOPHIL NFR BLD AUTO: 0 % — SIGNIFICANT CHANGE UP (ref 0–6)
EOSINOPHIL NFR BLD AUTO: 0.1 % — SIGNIFICANT CHANGE UP (ref 0–6)
GAS PNL BLDV: 136 MMOL/L — SIGNIFICANT CHANGE UP (ref 136–145)
GAS PNL BLDV: SIGNIFICANT CHANGE UP
GLUCOSE BLDC GLUCOMTR-MCNC: 166 MG/DL — HIGH (ref 70–99)
GLUCOSE BLDC GLUCOMTR-MCNC: 59 MG/DL — LOW (ref 70–99)
GLUCOSE BLDV-MCNC: 71 MG/DL — SIGNIFICANT CHANGE UP (ref 70–99)
GLUCOSE SERPL-MCNC: 58 MG/DL — LOW (ref 70–99)
GLUCOSE SERPL-MCNC: 67 MG/DL — LOW (ref 70–99)
GLUCOSE SERPL-MCNC: 72 MG/DL — SIGNIFICANT CHANGE UP (ref 70–99)
GLUCOSE UR QL: NEGATIVE MG/DL — SIGNIFICANT CHANGE UP
HCO3 BLDV-SCNC: 21 MMOL/L — LOW (ref 22–29)
HCT VFR BLD CALC: 39.4 % — SIGNIFICANT CHANGE UP (ref 34.5–45)
HCT VFR BLD CALC: 39.4 % — SIGNIFICANT CHANGE UP (ref 34.5–45)
HCT VFR BLD CALC: 41.1 % — SIGNIFICANT CHANGE UP (ref 34.5–45)
HCT VFR BLDA CALC: 40 % — SIGNIFICANT CHANGE UP (ref 34.5–46.5)
HGB BLD CALC-MCNC: 13.4 G/DL — SIGNIFICANT CHANGE UP (ref 11.7–16.1)
HGB BLD-MCNC: 13.3 G/DL — SIGNIFICANT CHANGE UP (ref 11.5–15.5)
HGB BLD-MCNC: 13.4 G/DL — SIGNIFICANT CHANGE UP (ref 11.5–15.5)
HGB BLD-MCNC: 13.5 G/DL — SIGNIFICANT CHANGE UP (ref 11.5–15.5)
HOROWITZ INDEX BLDV+IHG-RTO: 21 — SIGNIFICANT CHANGE UP
IMM GRANULOCYTES NFR BLD AUTO: 0.3 % — SIGNIFICANT CHANGE UP (ref 0–0.9)
IMM GRANULOCYTES NFR BLD AUTO: 0.4 % — SIGNIFICANT CHANGE UP (ref 0–0.9)
INR BLD: 0.98 RATIO — SIGNIFICANT CHANGE UP (ref 0.85–1.18)
INR BLD: 1.15 RATIO — SIGNIFICANT CHANGE UP (ref 0.85–1.18)
KETONES UR-MCNC: ABNORMAL MG/DL
LACTATE BLDV-MCNC: 0.6 MMOL/L — SIGNIFICANT CHANGE UP (ref 0.5–2)
LEUKOCYTE ESTERASE UR-ACNC: NEGATIVE — SIGNIFICANT CHANGE UP
LYMPHOCYTES # BLD AUTO: 1.12 K/UL — SIGNIFICANT CHANGE UP (ref 1–3.3)
LYMPHOCYTES # BLD AUTO: 1.15 K/UL — SIGNIFICANT CHANGE UP (ref 1–3.3)
LYMPHOCYTES # BLD AUTO: 12.1 % — LOW (ref 13–44)
LYMPHOCYTES # BLD AUTO: 15.9 % — SIGNIFICANT CHANGE UP (ref 13–44)
MAGNESIUM SERPL-MCNC: 1.7 MG/DL — SIGNIFICANT CHANGE UP (ref 1.6–2.6)
MAGNESIUM SERPL-MCNC: 1.9 MG/DL — SIGNIFICANT CHANGE UP (ref 1.6–2.6)
MCHC RBC-ENTMCNC: 31.5 PG — SIGNIFICANT CHANGE UP (ref 27–34)
MCHC RBC-ENTMCNC: 32.2 PG — SIGNIFICANT CHANGE UP (ref 27–34)
MCHC RBC-ENTMCNC: 32.4 PG — SIGNIFICANT CHANGE UP (ref 27–34)
MCHC RBC-ENTMCNC: 32.8 GM/DL — SIGNIFICANT CHANGE UP (ref 32–36)
MCHC RBC-ENTMCNC: 33.8 GM/DL — SIGNIFICANT CHANGE UP (ref 32–36)
MCHC RBC-ENTMCNC: 34 GM/DL — SIGNIFICANT CHANGE UP (ref 32–36)
MCV RBC AUTO: 95.2 FL — SIGNIFICANT CHANGE UP (ref 80–100)
MCV RBC AUTO: 95.4 FL — SIGNIFICANT CHANGE UP (ref 80–100)
MCV RBC AUTO: 96 FL — SIGNIFICANT CHANGE UP (ref 80–100)
MONOCYTES # BLD AUTO: 0.64 K/UL — SIGNIFICANT CHANGE UP (ref 0–0.9)
MONOCYTES # BLD AUTO: 0.86 K/UL — SIGNIFICANT CHANGE UP (ref 0–0.9)
MONOCYTES NFR BLD AUTO: 9.1 % — SIGNIFICANT CHANGE UP (ref 2–14)
MONOCYTES NFR BLD AUTO: 9.1 % — SIGNIFICANT CHANGE UP (ref 2–14)
NEUTROPHILS # BLD AUTO: 5.2 K/UL — SIGNIFICANT CHANGE UP (ref 1.8–7.4)
NEUTROPHILS # BLD AUTO: 7.4 K/UL — SIGNIFICANT CHANGE UP (ref 1.8–7.4)
NEUTROPHILS NFR BLD AUTO: 73.9 % — SIGNIFICANT CHANGE UP (ref 43–77)
NEUTROPHILS NFR BLD AUTO: 77.9 % — HIGH (ref 43–77)
NITRITE UR-MCNC: NEGATIVE — SIGNIFICANT CHANGE UP
NRBC # BLD: 0 /100 WBCS — SIGNIFICANT CHANGE UP (ref 0–0)
NT-PROBNP SERPL-SCNC: 51 PG/ML — SIGNIFICANT CHANGE UP (ref 0–125)
PCO2 BLDV: 37 MMHG — LOW (ref 39–42)
PH BLDV: 7.37 — SIGNIFICANT CHANGE UP (ref 7.32–7.43)
PH UR: 5 — SIGNIFICANT CHANGE UP (ref 5–8)
PHOSPHATE SERPL-MCNC: 3.1 MG/DL — SIGNIFICANT CHANGE UP (ref 2.5–4.5)
PLATELET # BLD AUTO: 199 K/UL — SIGNIFICANT CHANGE UP (ref 150–400)
PLATELET # BLD AUTO: 202 K/UL — SIGNIFICANT CHANGE UP (ref 150–400)
PLATELET # BLD AUTO: 206 K/UL — SIGNIFICANT CHANGE UP (ref 150–400)
PO2 BLDV: 74 MMHG — HIGH (ref 25–45)
POTASSIUM BLDV-SCNC: 3.9 MMOL/L — SIGNIFICANT CHANGE UP (ref 3.5–5.1)
POTASSIUM SERPL-MCNC: 3.8 MMOL/L — SIGNIFICANT CHANGE UP (ref 3.5–5.3)
POTASSIUM SERPL-MCNC: 4.4 MMOL/L — SIGNIFICANT CHANGE UP (ref 3.5–5.3)
POTASSIUM SERPL-MCNC: 4.5 MMOL/L — SIGNIFICANT CHANGE UP (ref 3.5–5.3)
POTASSIUM SERPL-SCNC: 3.8 MMOL/L — SIGNIFICANT CHANGE UP (ref 3.5–5.3)
POTASSIUM SERPL-SCNC: 4.4 MMOL/L — SIGNIFICANT CHANGE UP (ref 3.5–5.3)
POTASSIUM SERPL-SCNC: 4.5 MMOL/L — SIGNIFICANT CHANGE UP (ref 3.5–5.3)
PROT SERPL-MCNC: 6.9 G/DL — SIGNIFICANT CHANGE UP (ref 6–8.3)
PROT SERPL-MCNC: 7.1 G/DL — SIGNIFICANT CHANGE UP (ref 6–8.3)
PROT SERPL-MCNC: 7.4 G/DL — SIGNIFICANT CHANGE UP (ref 6–8.3)
PROT UR-MCNC: NEGATIVE MG/DL — SIGNIFICANT CHANGE UP
PROTHROM AB SERPL-ACNC: 10.3 SEC — SIGNIFICANT CHANGE UP (ref 9.5–13)
PROTHROM AB SERPL-ACNC: 12 SEC — SIGNIFICANT CHANGE UP (ref 9.5–13)
RBC # BLD: 4.13 M/UL — SIGNIFICANT CHANGE UP (ref 3.8–5.2)
RBC # BLD: 4.14 M/UL — SIGNIFICANT CHANGE UP (ref 3.8–5.2)
RBC # BLD: 4.28 M/UL — SIGNIFICANT CHANGE UP (ref 3.8–5.2)
RBC # FLD: 13.2 % — SIGNIFICANT CHANGE UP (ref 10.3–14.5)
RBC # FLD: 13.4 % — SIGNIFICANT CHANGE UP (ref 10.3–14.5)
RBC # FLD: 13.5 % — SIGNIFICANT CHANGE UP (ref 10.3–14.5)
RH IG SCN BLD-IMP: POSITIVE — SIGNIFICANT CHANGE UP
SAO2 % BLDV: 92.9 % — HIGH (ref 67–88)
SODIUM SERPL-SCNC: 138 MMOL/L — SIGNIFICANT CHANGE UP (ref 135–145)
SODIUM SERPL-SCNC: 141 MMOL/L — SIGNIFICANT CHANGE UP (ref 135–145)
SODIUM SERPL-SCNC: 142 MMOL/L — SIGNIFICANT CHANGE UP (ref 135–145)
SP GR SPEC: 1.02 — SIGNIFICANT CHANGE UP (ref 1–1.03)
TROPONIN I, HIGH SENSITIVITY RESULT: 4.8 NG/L — SIGNIFICANT CHANGE UP
TROPONIN T, HIGH SENSITIVITY RESULT: 8 NG/L — SIGNIFICANT CHANGE UP (ref 0–51)
TROPONIN T, HIGH SENSITIVITY RESULT: <6 NG/L — SIGNIFICANT CHANGE UP (ref 0–51)
UROBILINOGEN FLD QL: 0.2 MG/DL — SIGNIFICANT CHANGE UP (ref 0.2–1)
WBC # BLD: 7.04 K/UL — SIGNIFICANT CHANGE UP (ref 3.8–10.5)
WBC # BLD: 7.32 K/UL — SIGNIFICANT CHANGE UP (ref 3.8–10.5)
WBC # BLD: 9.5 K/UL — SIGNIFICANT CHANGE UP (ref 3.8–10.5)
WBC # FLD AUTO: 7.04 K/UL — SIGNIFICANT CHANGE UP (ref 3.8–10.5)
WBC # FLD AUTO: 7.32 K/UL — SIGNIFICANT CHANGE UP (ref 3.8–10.5)
WBC # FLD AUTO: 9.5 K/UL — SIGNIFICANT CHANGE UP (ref 3.8–10.5)

## 2024-03-08 PROCEDURE — 84484 ASSAY OF TROPONIN QUANT: CPT

## 2024-03-08 PROCEDURE — 99285 EMERGENCY DEPT VISIT HI MDM: CPT | Mod: 25

## 2024-03-08 PROCEDURE — 93010 ELECTROCARDIOGRAM REPORT: CPT

## 2024-03-08 PROCEDURE — 99291 CRITICAL CARE FIRST HOUR: CPT | Mod: GC

## 2024-03-08 PROCEDURE — 70450 CT HEAD/BRAIN W/O DYE: CPT | Mod: MC

## 2024-03-08 PROCEDURE — 96374 THER/PROPH/DIAG INJ IV PUSH: CPT

## 2024-03-08 PROCEDURE — 36415 COLL VENOUS BLD VENIPUNCTURE: CPT

## 2024-03-08 PROCEDURE — 73060 X-RAY EXAM OF HUMERUS: CPT

## 2024-03-08 PROCEDURE — 81003 URINALYSIS AUTO W/O SCOPE: CPT

## 2024-03-08 PROCEDURE — 70498 CT ANGIOGRAPHY NECK: CPT | Mod: 26

## 2024-03-08 PROCEDURE — 99285 EMERGENCY DEPT VISIT HI MDM: CPT

## 2024-03-08 PROCEDURE — 85025 COMPLETE CBC W/AUTO DIFF WBC: CPT

## 2024-03-08 PROCEDURE — 72148 MRI LUMBAR SPINE W/O DYE: CPT | Mod: 26,MC

## 2024-03-08 PROCEDURE — 73110 X-RAY EXAM OF WRIST: CPT | Mod: 26,RT

## 2024-03-08 PROCEDURE — 82550 ASSAY OF CK (CPK): CPT

## 2024-03-08 PROCEDURE — 99223 1ST HOSP IP/OBS HIGH 75: CPT

## 2024-03-08 PROCEDURE — 72146 MRI CHEST SPINE W/O DYE: CPT | Mod: 26,MC

## 2024-03-08 PROCEDURE — 72125 CT NECK SPINE W/O DYE: CPT | Mod: 26,MC

## 2024-03-08 PROCEDURE — 93005 ELECTROCARDIOGRAM TRACING: CPT

## 2024-03-08 PROCEDURE — 93308 TTE F-UP OR LMTD: CPT | Mod: 26

## 2024-03-08 PROCEDURE — 96375 TX/PRO/DX INJ NEW DRUG ADDON: CPT

## 2024-03-08 PROCEDURE — 73060 X-RAY EXAM OF HUMERUS: CPT | Mod: 26

## 2024-03-08 PROCEDURE — 73030 X-RAY EXAM OF SHOULDER: CPT

## 2024-03-08 PROCEDURE — 73130 X-RAY EXAM OF HAND: CPT | Mod: 26,RT

## 2024-03-08 PROCEDURE — 70496 CT ANGIOGRAPHY HEAD: CPT | Mod: 26

## 2024-03-08 PROCEDURE — 72141 MRI NECK SPINE W/O DYE: CPT | Mod: 26,MC

## 2024-03-08 PROCEDURE — 83735 ASSAY OF MAGNESIUM: CPT

## 2024-03-08 PROCEDURE — 83880 ASSAY OF NATRIURETIC PEPTIDE: CPT

## 2024-03-08 PROCEDURE — 73030 X-RAY EXAM OF SHOULDER: CPT | Mod: 26,LT

## 2024-03-08 PROCEDURE — 71045 X-RAY EXAM CHEST 1 VIEW: CPT

## 2024-03-08 PROCEDURE — 70450 CT HEAD/BRAIN W/O DYE: CPT | Mod: 26,MC

## 2024-03-08 PROCEDURE — 71250 CT THORAX DX C-: CPT | Mod: 26,MC

## 2024-03-08 PROCEDURE — 99291 CRITICAL CARE FIRST HOUR: CPT

## 2024-03-08 PROCEDURE — 82962 GLUCOSE BLOOD TEST: CPT

## 2024-03-08 PROCEDURE — 93321 DOPPLER ECHO F-UP/LMTD STD: CPT | Mod: 26

## 2024-03-08 PROCEDURE — 71045 X-RAY EXAM CHEST 1 VIEW: CPT | Mod: 26

## 2024-03-08 PROCEDURE — 72125 CT NECK SPINE W/O DYE: CPT | Mod: MC

## 2024-03-08 PROCEDURE — 80053 COMPREHEN METABOLIC PANEL: CPT

## 2024-03-08 RX ORDER — HYDROMORPHONE HYDROCHLORIDE 2 MG/ML
0.5 INJECTION INTRAMUSCULAR; INTRAVENOUS; SUBCUTANEOUS ONCE
Refills: 0 | Status: DISCONTINUED | OUTPATIENT
Start: 2024-03-08 | End: 2024-03-08

## 2024-03-08 RX ORDER — HYDROMORPHONE HYDROCHLORIDE 2 MG/ML
0.5 INJECTION INTRAMUSCULAR; INTRAVENOUS; SUBCUTANEOUS
Refills: 0 | Status: DISCONTINUED | OUTPATIENT
Start: 2024-03-08 | End: 2024-03-09

## 2024-03-08 RX ORDER — SODIUM CHLORIDE 9 MG/ML
1000 INJECTION, SOLUTION INTRAVENOUS
Refills: 0 | Status: DISCONTINUED | OUTPATIENT
Start: 2024-03-08 | End: 2024-03-09

## 2024-03-08 RX ORDER — ACETAMINOPHEN 500 MG
1000 TABLET ORAL EVERY 6 HOURS
Refills: 0 | Status: DISCONTINUED | OUTPATIENT
Start: 2024-03-08 | End: 2024-03-09

## 2024-03-08 RX ORDER — ONDANSETRON 8 MG/1
4 TABLET, FILM COATED ORAL ONCE
Refills: 0 | Status: COMPLETED | OUTPATIENT
Start: 2024-03-08 | End: 2024-03-08

## 2024-03-08 RX ORDER — MORPHINE SULFATE 50 MG/1
4 CAPSULE, EXTENDED RELEASE ORAL ONCE
Refills: 0 | Status: DISCONTINUED | OUTPATIENT
Start: 2024-03-08 | End: 2024-03-08

## 2024-03-08 RX ORDER — ACETAMINOPHEN 500 MG
1000 TABLET ORAL ONCE
Refills: 0 | Status: COMPLETED | OUTPATIENT
Start: 2024-03-08 | End: 2024-03-08

## 2024-03-08 RX ORDER — SODIUM FLUORIDE 1.1 G/100G
0 GEL ORAL
Qty: 0 | Refills: 0 | DISCHARGE

## 2024-03-08 RX ORDER — DEXTROSE 50 % IN WATER 50 %
50 SYRINGE (ML) INTRAVENOUS ONCE
Refills: 0 | Status: COMPLETED | OUTPATIENT
Start: 2024-03-08 | End: 2024-03-08

## 2024-03-08 RX ORDER — SODIUM CHLORIDE 9 MG/ML
1000 INJECTION, SOLUTION INTRAVENOUS ONCE
Refills: 0 | Status: COMPLETED | OUTPATIENT
Start: 2024-03-08 | End: 2024-03-08

## 2024-03-08 RX ORDER — PREGABALIN 225 MG/1
0 CAPSULE ORAL
Qty: 0 | Refills: 0 | DISCHARGE

## 2024-03-08 RX ORDER — CHLORHEXIDINE GLUCONATE 213 G/1000ML
1 SOLUTION TOPICAL
Refills: 0 | Status: DISCONTINUED | OUTPATIENT
Start: 2024-03-08 | End: 2024-03-09

## 2024-03-08 RX ORDER — DEXAMETHASONE 0.5 MG/5ML
8 ELIXIR ORAL EVERY 6 HOURS
Refills: 0 | Status: DISCONTINUED | OUTPATIENT
Start: 2024-03-08 | End: 2024-03-08

## 2024-03-08 RX ORDER — POTASSIUM CHLORIDE 20 MEQ
10 PACKET (EA) ORAL
Refills: 0 | Status: COMPLETED | OUTPATIENT
Start: 2024-03-08 | End: 2024-03-08

## 2024-03-08 RX ORDER — OXYCODONE HYDROCHLORIDE 5 MG/1
2.5 TABLET ORAL EVERY 4 HOURS
Refills: 0 | Status: DISCONTINUED | OUTPATIENT
Start: 2024-03-08 | End: 2024-03-09

## 2024-03-08 RX ORDER — MAGNESIUM SULFATE 500 MG/ML
2 VIAL (ML) INJECTION ONCE
Refills: 0 | Status: COMPLETED | OUTPATIENT
Start: 2024-03-08 | End: 2024-03-09

## 2024-03-08 RX ORDER — DEXAMETHASONE 0.5 MG/5ML
8 ELIXIR ORAL EVERY 6 HOURS
Refills: 0 | Status: DISCONTINUED | OUTPATIENT
Start: 2024-03-08 | End: 2024-03-09

## 2024-03-08 RX ORDER — ACETAMINOPHEN 500 MG
1000 TABLET ORAL EVERY 6 HOURS
Refills: 0 | Status: DISCONTINUED | OUTPATIENT
Start: 2024-03-08 | End: 2024-03-08

## 2024-03-08 RX ORDER — SODIUM CHLORIDE 9 MG/ML
1000 INJECTION INTRAMUSCULAR; INTRAVENOUS; SUBCUTANEOUS
Refills: 0 | Status: DISCONTINUED | OUTPATIENT
Start: 2024-03-08 | End: 2024-03-08

## 2024-03-08 RX ORDER — OXYCODONE HYDROCHLORIDE 5 MG/1
5 TABLET ORAL EVERY 4 HOURS
Refills: 0 | Status: DISCONTINUED | OUTPATIENT
Start: 2024-03-08 | End: 2024-03-09

## 2024-03-08 RX ORDER — PANTOPRAZOLE SODIUM 20 MG/1
40 TABLET, DELAYED RELEASE ORAL EVERY 24 HOURS
Refills: 0 | Status: DISCONTINUED | OUTPATIENT
Start: 2024-03-08 | End: 2024-03-09

## 2024-03-08 RX ADMIN — Medication 1000 MILLIGRAM(S): at 20:14

## 2024-03-08 RX ADMIN — CHLORHEXIDINE GLUCONATE 1 APPLICATION(S): 213 SOLUTION TOPICAL at 20:25

## 2024-03-08 RX ADMIN — HYDROMORPHONE HYDROCHLORIDE 0.5 MILLIGRAM(S): 2 INJECTION INTRAMUSCULAR; INTRAVENOUS; SUBCUTANEOUS at 12:15

## 2024-03-08 RX ADMIN — Medication 400 MILLIGRAM(S): at 23:06

## 2024-03-08 RX ADMIN — Medication 1000 MILLIGRAM(S): at 19:44

## 2024-03-08 RX ADMIN — HYDROMORPHONE HYDROCHLORIDE 0.5 MILLIGRAM(S): 2 INJECTION INTRAMUSCULAR; INTRAVENOUS; SUBCUTANEOUS at 20:25

## 2024-03-08 RX ADMIN — SODIUM CHLORIDE 75 MILLILITER(S): 9 INJECTION, SOLUTION INTRAVENOUS at 20:25

## 2024-03-08 RX ADMIN — Medication 101.6 MILLIGRAM(S): at 19:44

## 2024-03-08 RX ADMIN — HYDROMORPHONE HYDROCHLORIDE 0.5 MILLIGRAM(S): 2 INJECTION INTRAMUSCULAR; INTRAVENOUS; SUBCUTANEOUS at 15:17

## 2024-03-08 RX ADMIN — PANTOPRAZOLE SODIUM 40 MILLIGRAM(S): 20 TABLET, DELAYED RELEASE ORAL at 21:46

## 2024-03-08 RX ADMIN — Medication 100 MILLIEQUIVALENT(S): at 20:26

## 2024-03-08 RX ADMIN — Medication 400 MILLIGRAM(S): at 12:15

## 2024-03-08 RX ADMIN — SODIUM CHLORIDE 1000 MILLILITER(S): 9 INJECTION, SOLUTION INTRAVENOUS at 10:37

## 2024-03-08 RX ADMIN — Medication 100 MILLIEQUIVALENT(S): at 21:46

## 2024-03-08 RX ADMIN — Medication 1000 MILLIGRAM(S): at 23:36

## 2024-03-08 RX ADMIN — MORPHINE SULFATE 4 MILLIGRAM(S): 50 CAPSULE, EXTENDED RELEASE ORAL at 10:39

## 2024-03-08 RX ADMIN — Medication 101.6 MILLIGRAM(S): at 23:06

## 2024-03-08 RX ADMIN — HYDROMORPHONE HYDROCHLORIDE 0.5 MILLIGRAM(S): 2 INJECTION INTRAMUSCULAR; INTRAVENOUS; SUBCUTANEOUS at 20:40

## 2024-03-08 RX ADMIN — Medication 50 MILLILITER(S): at 20:26

## 2024-03-08 RX ADMIN — SODIUM CHLORIDE 125 MILLILITER(S): 9 INJECTION INTRAMUSCULAR; INTRAVENOUS; SUBCUTANEOUS at 19:44

## 2024-03-08 RX ADMIN — ONDANSETRON 4 MILLIGRAM(S): 8 TABLET, FILM COATED ORAL at 10:39

## 2024-03-08 NOTE — ED ADULT NURSE NOTE - NSFALLRISKINTERV_ED_ALL_ED

## 2024-03-08 NOTE — ED PROVIDER NOTE - OTHER RECORDS SUMMARY FREE TEXT FOR MDM OBTAINED AND REVIEWED OLD RECORDS QUESTION
[place] imaging reviewed,C5 and C6 vertebral body ACUTE anterior inferior endplate vertebral body   displaced corner fractures. No subluxation, no shoulder fracture noted.

## 2024-03-08 NOTE — ED PROVIDER NOTE - PHYSICAL EXAMINATION
Gen: Minimal distress due to pain  HEENT: Mucous membranes moist, pink conjunctivae, EOMI, c-collar in place  CV: RRR, no clubbing/cyanosis/edema  Resp: CTAB  GI: Abdomen soft, NT, ND. Normal BS. No rebound, no guarding  : No CVAT  Neuro: A&O x 3, 5-5 strength left upper and left lower extremity, 4 out of 5 strength right upper and right lower extremity  MSK: Patient logrolled with C-spine precautions, C-spine and thoracic spine tenderness to palpation.  Tenderness to palpation over the right snuffbox.  Bilateral diffuse shoulder tenderness to palpation but normal range of motion.  Skin: No rashes

## 2024-03-08 NOTE — CONSULT NOTE ADULT - ASSESSMENT
A/P: 63y Female with traumatic injury with new onset neurologic deficits and allodynia concerning for central cord syndrome     Pain control  C-collar  Please obtain MRI C/T/Lsp to evaluate for central cord  FU Xrays of the R Wrist  FU PVR  SCDs  rest of plan to follow pending imaging    Moni Oneal MD  Orthopaedic Surgery Resident    For all questions, please reach out via the following numbers for the on-call resident; do not reach out via Teams.  [facility] [place] [id number]  [facility]        [id number]  [facility]  [id number]/ [phone]   A/P: 63y Female with traumatic injury with new onset neurologic deficits and allodynia concerning for central cord syndrome     Pain control  C-collar  ICU for q1 neuro checks  Decadron 8mg q6  SCDs  Plan for OR 3/9 for C3-7 decompression  NPO after midnight  Please preop patient at 1am for OR  Please document SICU clearance for procedure    Moni Oneal MD  Orthopaedic Surgery Resident    For all questions, please reach out via the following numbers for the on-call resident; do not reach out via Teams.  [facility] [place] [id number]  [facility]        [id number]  [facility]  [id number]/ [phone]

## 2024-03-08 NOTE — CONSULT NOTE ADULT - PROBLEM SELECTOR RECOMMENDATION 9
Patient scheduled for cervical decompression  No contraindication to scheduled procedure after cardiac eval for syncope  NPO after MN   DVT and GI prophylaxis

## 2024-03-08 NOTE — ED PROVIDER NOTE - ED STEMI HIDDEN
Chief Complaint   Patient presents with     Depression     Anxiety       Initial /82 (BP Location: Right arm, Patient Position: Chair, Cuff Size: Adult Large)   Pulse 79   Temp 97.6  F (36.4  C) (Tympanic)   Resp 16   Ht 1.829 m (6')   Wt 138.2 kg (304 lb 11.2 oz)   SpO2 96%   BMI 41.32 kg/m   Estimated body mass index is 41.32 kg/m  as calculated from the following:    Height as of this encounter: 1.829 m (6').    Weight as of this encounter: 138.2 kg (304 lb 11.2 oz).  Medication Reconciliation: complete  Pamela M. Lechevalier, LPN     hide

## 2024-03-08 NOTE — ED PROVIDER NOTE - OBJECTIVE STATEMENT
62 yo F hx per EMR of gastric bypass, htn, hld, depression. Per EMS Passed out last night and now has right arm pain and weakness. patient reports at 730 PM she was walking from one room to another when she passed out without warning. patient was unable to get up and was on the ground overnight. patient notes bilateral shoulder pain and right sided arm weakness and numbness with pain. patient also with numbness of entire right leg and left numbness from knee down. patient reports she was feeling well prior to event, no recent illness. patient denies preceding headache, chest pain, shortness of breath, palpitations. patient reports when she woke up her heart was racing but she was nervous as her  is out of town. patient was trying to scoot on her sides and was banging on floor overnight trying to alert the downstairs neighbors and wet herself overnight. patient was able get up this morning and felt unsteady. patient was able to take Tylenol and scoot down to the door and let EMS in. PMD Sera Beckman, patient denies taking anticoagulants.

## 2024-03-08 NOTE — H&P ADULT - HISTORY OF PRESENT ILLNESS
Patient is a 63y Female PMH HTN, HLD, depression, gastric bypass who presents c/o cervical pain and RUE weakness. Per patient, she had a syncopal fall last night and does not recall the events; had LOC. When she woke up, had severe pain and noticed new onset pain and weakness in the RUE. She endorses severe pain in bilateral shoulders and arms. Denies other numbness/tingling/paresthesias/weakness. Denies bowel/bladder incontinence. Denies fevers/chills. No other complaints at this time.    HEALTH ISSUES - PROBLEM Dx:          MEDICATIONS  (STANDING):      Allergies    No Known Allergies    Intolerances        PAST MEDICAL & SURGICAL HISTORY:  No pertinent past medical history    History of hypertension    Hypercholesterolemia    Personal History of Gastric Bypass    S/P Cholecystectomy    S/P Hernia Repair  ventral hernia    H/O gastric bypass                              13.4   9.50  )-----------( 202      ( 08 Mar 2024 12:23 )             39.4       08 Mar 2024 12:23    141    |  104    |  17     ----------------------------<  67     4.4     |  21     |  0.62     Ca    9.0        08 Mar 2024 12:23  Mg     1.7       08 Mar 2024 08:42    TPro  6.9    /  Alb  4.0    /  TBili  0.5    /  DBili  x      /  AST  39     /  ALT  19     /  AlkPhos  92     08 Mar 2024 12:23          Urinalysis Basic - ( 08 Mar 2024 12:23 )    Color: x / Appearance: x / SG: x / pH: x  Gluc: 67 mg/dL / Ketone: x  / Bili: x / Urobili: x   Blood: x / Protein: x / Nitrite: x   Leuk Esterase: x / RBC: x / WBC x   Sq Epi: x / Non Sq Epi: x / Bacteria: x        Vital Signs Last 24 Hrs  T(C): 36.7 (03-08-24 @ 11:38), Max: 36.8 (03-08-24 @ 10:30)  T(F): 98 (03-08-24 @ 11:38), Max: 98.3 (03-08-24 @ 10:30)  HR: 113 (03-08-24 @ 12:10) (93 - 113)  BP: 105/64 (03-08-24 @ 12:10) (105/64 - 152/90)  BP(mean): 76 (03-08-24 @ 11:38) (76 - 76)  RR: 18 (03-08-24 @ 12:10) (16 - 20)  SpO2: 97% (03-08-24 @ 12:10) (95% - 99%)    Physical Exam:  Gen: NAD  Spine:  Skin intact  No gross deformity  TTP over midline cervical spine; severe TTP with palpation of upper extremities   No bony step offs  No paraspinal muscle ttp/hypertonicity   Negative Straight leg raise  Negative clonus  Negative babinski  Negative schaeffer  + rectal tone  No saddle anesthesia    Motor:                   C5                C6              C7               C8           T1   R            2/5                2/5            2/5             2/5          2/5  L             5/5               5/5             5/5             5/5          5/5                L2             L3             L4               L5            S1  R         4/5           4/5          4/5             4/5           4/5  L          5/5          5/5           5/5             5/5           5/5    Sensory:            C5         C6         C7      C8       T1        (0=absent, 1=impaired, 2=normal, NT=not testable)  R         2            2           2        2         2  L          2            2           2        2         2               L2          L3         L4      L5       S1         (0=absent, 1=impaired, 2=normal, NT=not testable)  R         2            2            2        2        2  L          2            2           2        2         2    Imaging:

## 2024-03-08 NOTE — ED PROVIDER NOTE - PHYSICAL EXAMINATION
Vital signs as available reviewed.  General:  No acute distress.  Head:  Normocephalic, atraumatic.  Eyes:  Conjunctiva pink, no icterus.  Cardiovascular:  Regular rate, no obvious murmur.  Respiratory:  Clear to auscultation, good air entry bilaterally.  Abdomen:  Soft, non-tender.  Musculoskeletal: + bilateral shoulder tenderness to palpation. + right shoulder pain with passive movement.  Neurologic: Alert and oriented, follows commands, extra-occular movements intact, no visual fields defect, no facial asymmetry, + right upper extremity drift, +left lower extremity drift, rapid alternating movements performed without difficulty, no sensory deficit, no aphasia, no dysarthria, no inattention. See stroke tab.  Skin:  Warm and dry.

## 2024-03-08 NOTE — ED ADULT NURSE NOTE - OBJECTIVE STATEMENT
pt arrives to ED with reports that last night around 7:30pm she last recalls being well, doing the dishes, then was walking from the kitchen to the dining room and "passed out" pt recalls that time being around 9pm last night. pt states she woke up on the floor unable to get herself up. pt reports she rocked back and forth to her sides trying to get up, banged on the floor to try to alert tenants down stairs but eventually gave up and went to sleep. pt reports around 6am her phone alarm for work went off which woke her. she states she was able to get herself up "but felt drunk", noted she had urinary incontinence and got herself cleaned up, then lowered herself down the steps on her buttocks to open the door for the ambulance. pt report weakness to right arm, "I feel like I just cant grab anything", pins and needles to right side of body upper and lower and pins and needles from left knee down to foot. bilat shoulder pain, R>L. denies dizziness, denies HA.

## 2024-03-08 NOTE — CONSULT NOTE ADULT - CRITICAL CARE ATTENDING COMMENT
Dr. Zaragoza (Attending Physician)  N -   P -   C -   GI -    -   H -   ID -   E -   MSK -  PPx -   Lines -   Dispo - Dr. Zaragoza (Attending Physician)  N - Anterior inferior corner fractures at C5 and C6 with prevertebral edema from C2 to C5. No   definite evidence of ligamentous injury, but high-grade multilevel central canal stenosis, severe central canal stenosis, and cord compression at C3-C4, C4-C5 and C5-C6. No cervical cord signal abnormality. Pt. had episode of urinary retention tonight, but good rectal tone and felt sensation to urinate but couldn't, d/w ortho spine resident who spoke with Dr. Layton. Plan for urgent OR in the morning. C-collar with spine restrictions.  P - Pulling good volumes monitor for resp depression with cervical spine injury. Place on ETCO2.  C - HD stable high map goals. ho htn holding meds. needs syncope workup trops neg, keep on tele and get echo.  GI - NPO for OR tomorrow.   - Urinary retention place galicia cath.  H - holding dvt ppx for OR.  ID - no abx at this time.  E - glucose well controlled  PPx - scd's  Lines - peripherals, galicia   Dispo - SICU

## 2024-03-08 NOTE — ED ADULT NURSE NOTE - OBJECTIVE STATEMENT
Unable to Assess 63 y.o female, A&Ox4, PMH HLD, HTN, pt presents to ED transfer from [place] c/o fall. pt states last night she was emptying her  when she suddenly syncopized falling to the floor, pt states she did not have any symptoms prior to episode. pt states when she woke up on the floor she was having right sided arms and leg numbness and pain. pt states she was not able to get herself off the floor until the following morning when she slid on her butt to open the door to get help. pt then went to [facility] where CT showed a C5-C6 fracture. per ems pt received 4mg of zofran and 4mg of morphine prior to arrival to [facility]. upon assessment pt presents with cervical collar in place, pt states she has "pins and needles" in her right extremities, diminished sensation to right side of body. pt denies chest pain, sob, N/V/D, fevers, chills. IV access established, cardiac monitor placed, pt safety and comfort provided.

## 2024-03-08 NOTE — CONSULT NOTE ADULT - SUBJECTIVE AND OBJECTIVE BOX
Patient is a 63y Female PMH HTN, HLD, depression, gastric bypass who presents c/o cervical pain and RUE weakness. Per patient, she had a syncopal fall last night and does not recall the events; had LOC. When she woke up, had severe pain and noticed new onset pain and weakness in the RUE. She endorses severe pain in bilateral shoulders and arms. Denies other numbness/tingling/paresthesias/weakness. Denies bowel/bladder incontinence. Denies fevers/chills. No other complaints at this time.    HEALTH ISSUES - PROBLEM Dx:          MEDICATIONS  (STANDING):      Allergies    No Known Allergies    Intolerances        PAST MEDICAL & SURGICAL HISTORY:  No pertinent past medical history    History of hypertension    Hypercholesterolemia    Personal History of Gastric Bypass    S/P Cholecystectomy    S/P Hernia Repair  ventral hernia    H/O gastric bypass                              13.4   9.50  )-----------( 202      ( 08 Mar 2024 12:23 )             39.4       08 Mar 2024 12:23    141    |  104    |  17     ----------------------------<  67     4.4     |  21     |  0.62     Ca    9.0        08 Mar 2024 12:23  Mg     1.7       08 Mar 2024 08:42    TPro  6.9    /  Alb  4.0    /  TBili  0.5    /  DBili  x      /  AST  39     /  ALT  19     /  AlkPhos  92     08 Mar 2024 12:23          Urinalysis Basic - ( 08 Mar 2024 12:23 )    Color: x / Appearance: x / SG: x / pH: x  Gluc: 67 mg/dL / Ketone: x  / Bili: x / Urobili: x   Blood: x / Protein: x / Nitrite: x   Leuk Esterase: x / RBC: x / WBC x   Sq Epi: x / Non Sq Epi: x / Bacteria: x        Vital Signs Last 24 Hrs  T(C): 36.7 (03-08-24 @ 11:38), Max: 36.8 (03-08-24 @ 10:30)  T(F): 98 (03-08-24 @ 11:38), Max: 98.3 (03-08-24 @ 10:30)  HR: 113 (03-08-24 @ 12:10) (93 - 113)  BP: 105/64 (03-08-24 @ 12:10) (105/64 - 152/90)  BP(mean): 76 (03-08-24 @ 11:38) (76 - 76)  RR: 18 (03-08-24 @ 12:10) (16 - 20)  SpO2: 97% (03-08-24 @ 12:10) (95% - 99%)    Physical Exam:  Gen: NAD  Spine:  Skin intact  No gross deformity  TTP over midline cervical spine; severe TTP with palpation of upper extremities   No bony step offs  No paraspinal muscle ttp/hypertonicity   Negative Straight leg raise  Negative clonus  Negative babinski  Negative schaeffer  + rectal tone  No saddle anesthesia    Motor:                   C5                C6              C7               C8           T1   R            2/5                2/5            2/5             2/5          2/5  L             5/5               5/5             5/5             5/5          5/5                L2             L3             L4               L5            S1  R         4/5           4/5          4/5             4/5           4/5  L          5/5          5/5           5/5             5/5           5/5    Sensory:            C5         C6         C7      C8       T1        (0=absent, 1=impaired, 2=normal, NT=not testable)  R         2            2           2        2         2  L          2            2           2        2         2               L2          L3         L4      L5       S1         (0=absent, 1=impaired, 2=normal, NT=not testable)  R         2            2            2        2        2  L          2            2           2        2         2    Imaging:        Patient is a 63y Female PMH HTN, HLD, depression, gastric bypass who presents c/o cervical pain and RUE weakness. Per patient, she had a syncopal fall last night and does not recall the events; had LOC. When she woke up, had severe pain and noticed new onset pain and weakness in the RUE. She endorses severe pain in bilateral shoulders and arms. Denies other numbness/tingling/paresthesias/weakness. Denies bowel/bladder incontinence. Denies fevers/chills. No other complaints at this time.    HEALTH ISSUES - PROBLEM Dx:          MEDICATIONS  (STANDING):      Allergies    No Known Allergies    Intolerances        PAST MEDICAL & SURGICAL HISTORY:  No pertinent past medical history    History of hypertension    Hypercholesterolemia    Personal History of Gastric Bypass    S/P Cholecystectomy    S/P Hernia Repair  ventral hernia    H/O gastric bypass                              13.4   9.50  )-----------( 202      ( 08 Mar 2024 12:23 )             39.4       08 Mar 2024 12:23    141    |  104    |  17     ----------------------------<  67     4.4     |  21     |  0.62     Ca    9.0        08 Mar 2024 12:23  Mg     1.7       08 Mar 2024 08:42    TPro  6.9    /  Alb  4.0    /  TBili  0.5    /  DBili  x      /  AST  39     /  ALT  19     /  AlkPhos  92     08 Mar 2024 12:23          Urinalysis Basic - ( 08 Mar 2024 12:23 )    Color: x / Appearance: x / SG: x / pH: x  Gluc: 67 mg/dL / Ketone: x  / Bili: x / Urobili: x   Blood: x / Protein: x / Nitrite: x   Leuk Esterase: x / RBC: x / WBC x   Sq Epi: x / Non Sq Epi: x / Bacteria: x        Vital Signs Last 24 Hrs  T(C): 36.7 (03-08-24 @ 11:38), Max: 36.8 (03-08-24 @ 10:30)  T(F): 98 (03-08-24 @ 11:38), Max: 98.3 (03-08-24 @ 10:30)  HR: 113 (03-08-24 @ 12:10) (93 - 113)  BP: 105/64 (03-08-24 @ 12:10) (105/64 - 152/90)  BP(mean): 76 (03-08-24 @ 11:38) (76 - 76)  RR: 18 (03-08-24 @ 12:10) (16 - 20)  SpO2: 97% (03-08-24 @ 12:10) (95% - 99%)    Physical Exam:  Gen: NAD  Spine:  Skin intact  No gross deformity  TTP over midline cervical spine; severe TTP with palpation of upper extremities   No bony step offs  No paraspinal muscle ttp/hypertonicity   Negative Straight leg raise  Negative clonus  Negative babinski  Negative schaeffer  + rectal tone  No saddle anesthesia    Motor:                   C5                C6              C7               C8           T1   R            2/5                2/5            2/5             2/5          2/5  L             3/5               3/5             3/5             3/5          3/5                L2             L3             L4               L5            S1  R         4/5           4/5          4/5             4/5           4/5  L          5/5          5/5           5/5             5/5           5/5    Sensory:            C5         C6         C7      C8       T1        (0=absent, 1=impaired, 2=normal, NT=not testable)  R         2            2           2        2         2  L          2            2           2        2         2               L2          L3         L4      L5       S1         (0=absent, 1=impaired, 2=normal, NT=not testable)  R         2            2            2        2        2  L          2            2           2        2         2    Imaging:

## 2024-03-08 NOTE — ED PROVIDER NOTE - PROGRESS NOTE DETAILS
results from radiologist: Cervical spine fractures noted on CT scan. patient placed in c-collar. CTC contacted for trauma/spine/nsx evaluation for transfer. CTC discussed case with Trauma Dr Medina, tier 1 requested. Dr Layton from spine accepting. IV access difficult to obtain. patient refusing further attempts here.

## 2024-03-08 NOTE — ED PROVIDER NOTE - PROGRESS NOTE DETAILS
Attending MD Cheng: Assumed care of patient in signout, patient is here for evaluation of right-sided weakness bilateral shoulder pain numbness and tingling of the right upper extremity lower extremity and left lower extremity below the left knee after a syncopal fall yesterday.  Consultations for spine surgery and trauma surgery are in place here, patient has received MRI of her cervical spine as well as lumbar and thoracic.  Based on my review of MRI I see multiple areas of probable severe stenosis and may be even cervical cord compression, no formal read yet provided by radiology.  Orthopedic spine service has been paged so that they can review MRIs expeditiously given patient continues to have significant weakness and hypoesthesia right upper extremity and right lower extremity, concerning story for possible cervical cord compromise.  Less likely that this is CVA as patient is complaining of pain associated with the symptoms so more likely cord related myelopathy.  Nonetheless we will obtain CT angiogram head and neck to rule out any cervical artery injury that may have occurred after syncopal fall.  Patient remains on telemetry monitoring as part of investigation for possible syncopal fall, lab work noncontributory including troponin level of 8 CTs at outside hospital of the cervical spine notable for C5 and C6 acute anterior inferior endplate vertebral body corner fractures.  Ultimate disposition will be in accordance with final opinion from spine surgery and trauma surgery. Jennie Killian DO (PGY3): pt signed out to me by day team. Spoke with ortho, evaluated pt at bedside and reviewed MRI, concern for possible central cord syndrome, pending to speak with attending Dr. Layton. Jennie Killian DO (PGY3): Spoke with Dr. Medina with trauma, pt to be admitted to SICU under Dr. Layton. Attending MD Cheng: I personally discussed this case with trauma attending surgeon Dr. Medina, he agrees with preliminary concern for severe spinal canal stenosis on MRI at cervical cord level 3/4.  Dr. Medina discussed case with attending orthopedic spine surgeon Dr. Layton, he except service admission to SICU and Dr. Layton is going to see patient in hospital shortly

## 2024-03-08 NOTE — ED PROVIDER NOTE - ADMIT DISPOSITION PRESENT ON ADMISSION SEPSIS
Price (Use Numbers Only, No Special Characters Or $): 165 Treatment Number (Optional): 0 Device Serial Number (Optional): 1 of 1 Detail Level: [employer] (Optional- Include Units): 5/28 External Cooling Fan Speed: 5 Post-Care Instructions: I reviewed with the patient in detail post-care instructions. Patient should stay away from the sun and wear sun protection until treated areas are fully healed. Consent: Written consent obtained, risks reviewed including but not limited to crusting, scabbing, blistering, scarring, darker or lighter pigmentary change, bruising, and/or incomplete response. Contact: Light Pre-Procedure Text: After consent was obtained, the treatment areas were cleansed and treated using the parameters mentioned above. Passes: 1 No

## 2024-03-08 NOTE — CONSULT NOTE ADULT - SUBJECTIVE AND OBJECTIVE BOX
TRAUMA SERVICE (Acute Care Surgery / [facility] - #9089) - CONSULT NOTE  --------------------------------------------------------------------------------------------    TRAUMA ACTIVATION LEVEL: III    MECHANISM OF INJURY:      [] Blunt  	[] MVC	[x] Fall	[] Pedestrian Struck	[] Motorcycle accident      [] Penetrating  	[] Gun Shot Wound 		[] Stab Wound    GCS: 	E: 4	V: 5	M: 6      HPI:   Patient is a 63y old  Female who presents with a chief complaint of fall    Ms. Dumont is a 63 year old woman with a PMh of RYGB, hypertension, hyperlipidemia, depression, social EtOH use, who presented after syncope and fall. She lost consciousness wihtout warning or without aura. This has never happened in the past. She woke up some time later (unsure how long) around 9pm but felt too weak to steady herself or get up. she lay on the carpeted floor from 9pm->7:30am before she regained strength to take herself downstairs to call for help. She reports bilateral upper extremity pain, mostly located in the shoulders, R thumb pain, upper neck pain. She has new onset bilateral upper extremity weakness, R>L She has remained A&O since the event. Workup demonstrated c5-6 cervical fractures, no other injuries.     Primary Survey:    A - airway intact  B - bilateral breath sounds and good chest rise  C - initial BP: 105/64 (03-08-24 @ 12:10) , HR: 113 (03-08-24 @ 12:10) , palpable pulses in all extremities  D - GCS 15 on arrival  Exposure obtained      Secondary Survey:   General: NAD  HEENT: Normocephalic, atraumatic, EOMI, PEERLA.  Neck: Soft, midline trachea, C-collar in place, tender to palpation in the posterior cervical neck  Chest: No chest wall tenderness. +Shoulder tenderness bilateral  Cardiac: S1, S2, RRR  Respiratory: Bilateral breath sounds, clear and equal bilaterally  Abdomen: Soft, non-distended, non-tender, no rebound, no guarding, no masses palpated  Pelvis: Stable, non-tender, no ecchymosis  Ext: palp radial b/l UE, b/l DP palp in Lower Extrem,, decreased strength in the bilteral upper extremity R>L, sensation intac, tenderness over R thumb  Back: no TTP, no palpable runoff/stepoff/deformity  Rectal: No kolby blood, SABINO with good tone    Patient denies fevers/chills, denies lightheadedness/dizziness, denies SOB/chest pain, denies nausea/vomiting, denies constipation/diarrhea.      ROS: 10-system review is otherwise negative except HPI above.      PAST MEDICAL & SURGICAL HISTORY:  istory of hypertension  Hypercholesterolemia  Personal History of Gastric Bypass  S/P Cholecystectomy  S/P Hernia Repair  ventral hernia  H/O gastric bypass    FAMILY HISTORY:    [] Family history not pertinent as reviewed with the patient and family    ALLERGIES: No Known Allergies      CURRENT MEDICATIONS  MEDICATIONS (STANDING):   MEDICATIONS (PRN):  --------------------------------------------------------------------------------------------    Vitals:   T(C): 36.7 (03-08-24 @ 11:38), Max: 36.8 (03-08-24 @ 10:30)  HR: 113 (03-08-24 @ 12:10) (93 - 113)  BP: 105/64 (03-08-24 @ 12:10) (105/64 - 152/90)  RR: 18 (03-08-24 @ 12:10) (16 - 20)  SpO2: 97% (03-08-24 @ 12:10) (95% - 99%)  CAPILLARY BLOOD GLUCOSE      POCT Blood Glucose.: 76 mg/dL (08 Mar 2024 08:11)    CAPILLARY BLOOD GLUCOSE      POCT Blood Glucose.: 76 mg/dL (08 Mar 2024 08:11)      Height (cm): 162.6 (03-08 @ 11:10)  Weight (kg): 69.4 (03-08 @ 08:10)  BMI (kg/m2): 26.2 (03-08 @ 11:10)  BSA (m2): 1.75 (03-08 @ 11:10)      --------------------------------------------------------------------------------------------    LABS  CBC (03-08 @ 12:23)                              13.4                           9.50    )----------------(  202        77.9<H>% Neutrophils, 12.1<L>% Lymphocytes, ANC: 7.40                                39.4    CBC (03-08 @ 08:42)                              13.5                           7.04    )----------------(  206        73.9  % Neutrophils, 15.9  % Lymphocytes, ANC: 5.20                                41.1      BMP (03-08 @ 12:23)             141     |  104     |  17    		Ca++ --      Ca 9.0                ---------------------------------( 67<L> 		Mg --                 4.4     |  21<L>   |  0.62  			Ph --      BMP (03-08 @ 08:42)             142     |  104     |  18    		Ca++ --      Ca 9.3                ---------------------------------( 72    		Mg 1.7                4.5     |  24      |  0.76  			Ph --        LFTs (03-08 @ 12:23)      TPro 6.9 / Alb 4.0 / TBili 0.5 / DBili -- / AST 39 / ALT 19 / AlkPhos 92  LFTs (03-08 @ 08:42)      TPro 7.4 / Alb 3.6 / TBili 0.7 / DBili -- / AST 38 / ALT 26 / AlkPhos 86      Cardiac Markers (03-08 @ 12:23)     HSTrop: -- / CKMB: -- / CK: 291  Cardiac Markers (03-08 @ 08:42)     HSTrop: -- / CKMB: -- / CK: 304        --------------------------------------------------------------------------------------------    MICROBIOLOGY  Urinalysis (03-08 @ 12:23):     Color:  / Appearance:  / SG:  / pH:  / Gluc: 67<L> / Ketones:  / Bili:  / Urobili:  / Protein : / Nitrites:  / Leuk.Est:  / RBC:  / WBC:  / Sq Epi:  / Non Sq Epi:  / Bacteria          --------------------------------------------------------------------------------------------    IMAGING  < from: CT Cervical Spine No Cont (03.08.24 @ 09:05) >  CT HEAD:    The ventricles are within normal limits in size. There is prominence of   cortical sulci and fissures related to involutional changes. The   gray-white matter differentiation is preserved. There is no midline shift   or mass effect. There is no acute intracranial hemorrhage.    There is no acute calvarial fracture. The visualized orbits are   unremarkable. The visualized paranasal sinuses and tympanomastoid   cavities are well-aerated.    CT CERVICAL:    There is straightening of the mid to upper cervical spine. There is mild   levo convexity of cervical spine. There are C5 and C6 vertebral body   ACUTE anterior inferior vertebral body endplate displaced corner   fractures. The middle and posterior C5 and C6 vertebrae are intact. There   are no other acute cervical vertebrae fractures. There is no traumatic   subluxation. The great several junction and C1-C2 relation is maintained.    There is multilevel cervical spondylosis and degenerative disc disease.   Evaluation central spinal canal is limited image degradation and   limitations of CT imaging. Findings nonspecific disc levels:    C2-C3: Mild disc bulge with superimposed small central disc protrusion   effaces sac and mildly indents ventral aspect of the cervical cord   resulting in mild central spinal stenosis. No significant foraminal   stenosis. Mild bilateral facet arthritis.    C3-C4: Moderate size posterior disc herniation effacing sac and   compressing cervical aspect of cervical cord resulting in severe central   spinal stenosis with cord compression. Mild right foraminal stenosis.   Mild bilateral facet arthritis and right-sided uncovertebral disease.    C4-C5: There is prominent disc osteophyte complex effacing sac   compressing the ventral aspect of cervical cord resulting in moderate to   severe central spinal stenosis. There is mild left foraminal stenosis.   There is bilateral facet arthritis and left-sided uncovertebral disease.    C5-C6: Prominent disc osteophyte complex effaces sac and compresses   ventral aspect of cervical cord resulting in moderate to severe central   spinal stenosis. Mild to moderate left foraminal stenosis. Bilateral   facet arthritis and uncovertebral disease.    C6-C7: Disc osteophyte complex with small to moderate left paracentral   disc herniation effaces sac and indenting ventral aspect of cervical cord   resulting in moderate central spinal stenosis. No significant foraminal   stenosis. Mild bilateral facet arthritis.    C7-T1: Image degradation limits evaluation. No significant central spinal   or foraminal stenosis.    Images through the upper thorax reveals no apical consolidation or   pneumothorax.    IMPRESSION:    No acute intracranial  hemorrhage or calvarial fracture.    C5 and C6 vertebral body ACUTE anterior inferior endplate vertebral body   displaced corner fractures. No subluxation.    Multilevel cervical spondylosis and degenerative disease and spinal   stenoses. Severe C3-4, moderate to severe C4-5 and C5-6 and moderate C6-7   central spinal stenoses. A follow-up MRI of cervical spine may prove   helpful for further evaluation.    Communication:  I discussed the finding of this report with Dr. Garcia   at 9:33 AM on 3/8/2024.  Critical value policy of the hospital was   followed.  Read back and confirmation of receipt of this communication   was performed.  This verbal communication supplements the text report of   this document.      < end of copied text >      --------------------------------------------------------------------------------------------

## 2024-03-08 NOTE — CONSULT NOTE ADULT - SUBJECTIVE AND OBJECTIVE BOX
63-year-old female history of gastric bypass, hypertension, hyperlipidemia, depression, social EtOH use, transferred from [place] for C5-C6 vertebral body fracture.  Yesterday patient got up to go to the kitchen, felt lightheaded, had a syncopal episode and fell on the floor and laid on the floor all night because of right arm and right leg weakness.  Eventually was able to make a call and presented to the ED at [place].  Continues to complain of right upper lower extremity weakness, and bilateral shoulder pain.  Denies chest pain or shortness of breath, nausea vomiting, also complains of abdominal upper back pain. Patient was transferred to [facility] for further treatment. Patient is scheduled for a C3-7 decompression.      PAST MEDICAL & SURGICAL HISTORY:      History of hypertension      Hypercholesterolemia      Personal History of Gastric Bypass      S/P Cholecystectomy      S/P Hernia Repair  ventral hernia      H/O gastric bypass            MEDICATIONS  (STANDING):  acetaminophen     Tablet .. 1000 milliGRAM(s) Oral every 6 hours  dexAMETHasone  IVPB 8 milliGRAM(s) IV Intermittent every 6 hours  sodium chloride 0.9%. 1000 milliLiter(s) (125 mL/Hr) IV Continuous <Continuous>    MEDICATIONS  (PRN):  HYDROmorphone  Injectable 0.5 milliGRAM(s) IV Push every 3 hours PRN breakthrough pain  oxyCODONE    IR 2.5 milliGRAM(s) Oral every 4 hours PRN Moderate Pain (4 - 6)  oxyCODONE    IR 5 milliGRAM(s) Oral every 4 hours PRN Severe Pain (7 - 10)    Social Hx:  Tobacco: neg  ETOH: Socially  Drugs: Neg    Family Hx:  As per my conversation with the patient, non contributory     ROS  CONSTITUTIONAL: No weakness, fevers or chills  EYES/ENT: No visual changes;  No vertigo or throat pain   NECK: No pain or stiffness  RESPIRATORY: No cough, wheezing, hemoptysis; No shortness of breath  CARDIOVASCULAR: No chest pain or palpitations  GASTROINTESTINAL: No abdominal or epigastric pain. No nausea, vomiting, or hematemesis; No diarrhea or constipation. No melena or hematochezia.  GENITOURINARY: No dysuria, frequency or hematuria  NEUROLOGICAL: right sided weakness  SKIN: No itching, burning, rashes, or lesions   MUSCULOSKELETAL: neck pain     INTERVAL HPI/OVERNIGHT EVENTS:  T(C): 37.7 (03-08-24 @ 18:29), Max: 37.7 (03-08-24 @ 18:29)  HR: 105 (03-08-24 @ 18:29) (93 - 113)  BP: 127/72 (03-08-24 @ 18:29) (105/64 - 152/90)  RR: 13 (03-08-24 @ 18:29) (13 - 20)  SpO2: 93% (03-08-24 @ 18:29) (93% - 99%)  Wt(kg): --  I&O's Summary      PHYSICAL EXAM:  GENERAL: NAD, well-groomed, well-developed  HEAD:  Atraumatic, Normocephalic  EYES: EOMI, PERRLA, conjunctiva and sclera clear  ENMT: No tonsillar erythema, exudates, or enlargement; Moist mucous membranes, Good dentition, No lesions  NECK: Supple, No JVD, Normal thyroid  NERVOUS SYSTEM:  Alert & Oriented X3, Good concentration ;right sided weakness   CHEST/LUNG: Clear to percussion bilaterally; No rales, rhonchi, wheezing, or rubs  HEART: Regular rate and rhythm; No murmurs, rubs, or gallops  ABDOMEN: Soft, Nontender, Nondistended; Bowel sounds present  EXTREMITIES:  2+ Peripheral Pulses, No clubbing, cyanosis, or edema  LYMPH: No lymphadenopathy noted  SKIN: No rashes or lesions        LABS:                        13.4   9.50  )-----------( 202      ( 08 Mar 2024 12:23 )             39.4     03-08    141  |  104  |  17  ----------------------------<  67<L>  4.4   |  21<L>  |  0.62    Ca    9.0      08 Mar 2024 12:23  Mg     1.7     03-08    TPro  6.9  /  Alb  4.0  /  TBili  0.5  /  DBili  x   /  AST  39  /  ALT  19  /  AlkPhos  92  03-08    PT/INR - ( 08 Mar 2024 17:27 )   PT: 12.0 sec;   INR: 1.15 ratio         PTT - ( 08 Mar 2024 17:27 )  PTT:27.4 sec  Urinalysis Basic - ( 08 Mar 2024 12:23 )    Color: x / Appearance: x / SG: x / pH: x  Gluc: 67 mg/dL / Ketone: x  / Bili: x / Urobili: x   Blood: x / Protein: x / Nitrite: x   Leuk Esterase: x / RBC: x / WBC x   Sq Epi: x / Non Sq Epi: x / Bacteria: x      CAPILLARY BLOOD GLUCOSE      POCT Blood Glucose.: 76 mg/dL (08 Mar 2024 08:11)        Urinalysis Basic - ( 08 Mar 2024 12:23 )    Color: x / Appearance: x / SG: x / pH: x  Gluc: 67 mg/dL / Ketone: x  / Bili: x / Urobili: x   Blood: x / Protein: x / Nitrite: x   Leuk Esterase: x / RBC: x / WBC x   Sq Epi: x / Non Sq Epi: x / Bacteria: x    EKG: Ventricular Rate 91 BPM    Atrial Rate 91 BPM    P-R Interval 140 ms    QRS Duration 136 ms    Q-T Interval 418 ms    QTC Calculation(Bazett) 514 ms    P Axis 62 degrees    R Axis -21 degrees    T Axis 21 degrees    Diagnosis Line Normal sinus rhythm  Right bundle branch block

## 2024-03-08 NOTE — H&P ADULT - ASSESSMENT
A/P: 63y Female with traumatic injury with new onset neurologic deficits and allodynia concerning for central cord syndrome     Pain control  C-collar  FU CT R Hand to evaluate for possible scaphoid fx  ICU for q1 neuro checks  Decadron 8mg q6  SCDs  Plan for OR 3/9 for C3-7 decompression  NPO after midnight  Please preop patient at 1am for OR  Please document SICU clearance for procedure    Moni Oneal MD  Orthopaedic Surgery Resident    For all questions, please reach out via the following numbers for the on-call resident; do not reach out via Teams.  [facility] [id number]  [facility]        [id number]  [facility]  [id number]/ [phone]

## 2024-03-08 NOTE — CONSULT NOTE ADULT - ASSESSMENT
Ms. Dumont is a 63 year old woman with a PMh of RYGB, hypertension, hyperlipidemia, depression, social EtOH use, who presented after syncope and fall, remained down on ground 9pm->7:30am before presentation. Reports bilateral shoulder pain, R thumb pain, upper neck pain. She has new onset bilateral upper extremity weakness, R>L with acute c5-6 cervical fractures, no other injuries.     Plan:  - will defer management of cervical spine fx & UE weaknessto orthopedics, f/u MRI  - no other acute injuries noted on workup or physical exam  - will perform tertiary exam tomorrow    Emerson Packer, PGY3  Acute Care Surgery   [id number]   Ms. Dumont is a 63 year old woman with a PMHx of RYGB, hypertension, hyperlipidemia, depression, social EtOH use, who presented after syncope and fall, remained down on ground 9pm->7:30am before presentation. Reports bilateral shoulder pain, R thumb pain, upper neck pain. She has new onset bilateral upper extremity weakness, R>L with acute c5-6 cervical fractures, no other injuries.     Plan:  - will defer management of cervical spine fx & UE weaknessto orthopedics, f/u MRI  - syncope workup per medicine  - no other acute injuries noted on workup or physical exam  - will perform tertiary exam tomorrow    Emerson Packer, PGY3  Acute Care Surgery   [id number]

## 2024-03-08 NOTE — H&P ADULT - TIME BILLING
Please note that over 75 minutes of time was spent in care of this patient including  - pre visit preparation  - in person visit  - post visit documentation  - review of imaging  - discussion with colleagues

## 2024-03-08 NOTE — H&P ADULT - ATTENDING COMMENTS
This is a 63 year old female that had a fall ~24 hours ago with subsequent neurologic deficits. She has known c-spine stenosis with cervical fractures. Given the fact that she has these neurologic deficits and the severity of her spinal stenosis we will proceed with a C3-C7 decompression/fusion procedure. Risks/benefits discussed with the patient at length. Specific risks were discussed including pain, need for reoperation, injury to surrounding nerves/arteries/veins, non resolution of symptoms, csf leak, dural tear, paralysis, nerve root injury, nonunion, instrumentation failure, dvt/pe, infection, death. The patient understood these risks and proper informed consent was obtained.     Exam has slightly improved in terms of right biceps strength (2+/5), but still with significant deficits as above. I was able to examine the patient on both 3/8 and 3/9. She has been monitored closely in the ICU overnight as well.

## 2024-03-08 NOTE — ED ADULT NURSE NOTE - NSICDXPASTMEDICALHX_GEN_ALL_CORE_FT
PAST MEDICAL HISTORY:  History of hypertension     Hypercholesterolemia     No pertinent past medical history

## 2024-03-08 NOTE — ED PROVIDER NOTE - CARE PLAN
Comprehensive Intake Entered On:  2/19/2021 2:27 PM CST    Performed On:  2/19/2021 2:20 PM CST by Fouzia Angelo               Summary   Chief Complaint :   c/o migrane    Fouzia Angelo - 2/19/2021 2:28 PM CST     Menstrual Status :   Menarcheal   Weight Measured :   156 lb(Converted to: 156 lb 0 oz, 70.760 kg)    Height Measured :   61.5 in(Converted to: 5 ft 1 in, 156.21 cm)    Body Mass Index :   29 kg/m2 (HI)    Body Surface Area :   1.75 m2   Systolic Blood Pressure :   100 mmHg   Diastolic Blood Pressure :   74 mmHg   Mean Arterial Pressure :   83 mmHg   Peripheral Pulse Rate :   60 bpm   BP Site :   Right arm   BP Method :   Manual   HR Method :   Electronic   Temperature Tympanic :   97.0 DegF(Converted to: 36.1 DegC)  (LOW)    Fouzia Angelo - 2/19/2021 2:20 PM CST   Health Status   Allergies Verified? :   Yes   Medication History Verified? :   Yes   Immunizations Current :   Yes   Medical History Verified? :   Yes   Pre-Visit Planning Status :   Completed   Tobacco Use? :   Never smoker   Fouzia Angelo - 2/19/2021 2:20 PM CST   Consents   Consent for Immunization Exchange :   Consent Granted   Consent for Immunizations to Providers :   Consent Granted   Fouzia Angelo - 2/19/2021 2:20 PM CST   Meds / Allergies   (As Of: 2/19/2021 2:27:47 PM CST)   Allergies (Active)   erythromycin  Estimated Onset Date:   Unspecified ; Reactions:   GI upset ; Created By:   Maylin Banks; Reaction Status:   Active ; Category:   Drug ; Substance:   erythromycin ; Type:   Sensitivity ; Updated By:   Maylin Banks; Reviewed Date:   2/19/2021 2:24 PM CST      Iron  Estimated Onset Date:   Unspecified ; Reactions:   Swelling ; Created By:   Brenda Almeida CMA; Reaction Status:   Active ; Category:   Drug ; Substance:   Iron ; Type:   Allergy ; Updated By:   Brenda Almeida CMA; Reviewed Date:   2/19/2021 2:24 PM CST      sulfa drug  Estimated Onset Date:   Unspecified ; Created By:   Shandra Marshall;  Reaction Status:   Active ; Category:   Drug ; Substance:   sulfa drug ; Type:   Allergy ; Updated By:   Shandra Marshall; Reviewed Date:   2/19/2021 2:24 PM CST        Medication List   (As Of: 2/19/2021 2:27:47 PM CST)   Prescription/Discharge Order    ondansetron  :   ondansetron ; Status:   Prescribed ; Ordered As Mnemonic:   Zofran 4 mg oral tablet ; Simple Display Line:   4 mg, 1 tab(s), Oral, q8 hrs, PRN: nausea, 30 tab(s), 1 Refill(s) ; Ordering Provider:   Sabina Ortiz PA-C; Catalog Code:   ondansetron ; Order Dt/Tm:   10/19/2020 12:02:32 PM CDT            ID Risk Screen   Recent Travel History :   No recent travel   Family Member Travel History :   No recent travel   Other Exposure to Infectious Disease :   Unknown   COVID-19 Testing Status :   No COVID-19 test performed   Fouzia Angelo 2/19/2021 2:20 PM CST   1 Principal Discharge DX:	Syncope  Secondary Diagnosis:	Numbness  Secondary Diagnosis:	Weakness   Principal Discharge DX:	Cervical spine fracture  Secondary Diagnosis:	Numbness  Secondary Diagnosis:	Weakness  Secondary Diagnosis:	Syncope

## 2024-03-08 NOTE — CONSULT NOTE ADULT - SUBJECTIVE AND OBJECTIVE BOX
Cardiology Consult Note   [Please check [email or web address] password: [email or web address]" for cardiology service schedule and contact information]    History of Present Illness:   63-year-old female history of gastric bypass, hypertension, hyperlipidemia, depression, social EtOH use, transferred from [facility] for C5-C6 vertebral body fracture.  Yesterday patient got up to go to the kitchen, felt lightheaded, had a syncopal episode and fell on the floor and laid on the floor all night because of right arm and right leg weakness.  Eventually was able to make a call and presented to the ED at [facility].  Continues to complain of right upper lower extremity weakness, and bilateral shoulder pain.  Denies chest pain or shortness of breath, nausea vomiting, also complains of abdominal upper back pain. Patient was transferred to [facility] for further treatment. Patient is scheduled for a C3-7 decompression.    Cardiology consulted for pre-op optimization. Pt w/o active chest pain and w/o signs of acute volume overload on exam. Labs during this admission include neg trop, and normal BNP.     EKG:  NSR, RBBB, non ischemic    PMHx: reviewed, see below  SOCIAL HISTORY:  unchanged    MEDICATIONS:  acetaminophen   IVPB .. 1000 milliGRAM(s) IV Intermittent every 6 hours  HYDROmorphone  Injectable 0.5 milliGRAM(s) IV Push every 3 hours PRN  oxyCODONE    IR 2.5 milliGRAM(s) Oral every 4 hours PRN  oxyCODONE    IR 5 milliGRAM(s) Oral every 4 hours PRN  pantoprazole  Injectable 40 milliGRAM(s) IV Push every 24 hours  dexAMETHasone  IVPB 8 milliGRAM(s) IV Intermittent every 6 hours  chlorhexidine 2% Cloths 1 Application(s) Topical <User Schedule>  dextrose 5% + lactated ringers. 1000 milliLiter(s) IV Continuous <Continuous>  magnesium sulfate  IVPB 2 Gram(s) IV Intermittent once      REVIEW OF SYSTEMS:  CV: chest pain (-), palpitation (-), orthopnea (-), PND (-), edema (-)  PULM: SOB (-), cough (-), wheezing (-), hemoptysis (-).   CONST: fever (-), chills (-) or fatigue (-)  GI: abdominal distension (-), abdominal pain (-) , nausea/vomiting (-), hematemesis, (-), melena (-), hematochezia (-)  : dysuria (-), frequency (-), hematuria (-).   NEURO: numbness (-), weakness (-), dizziness (-)  SKIN: itching (-), rash (-)  HEENT:  visual changes (-); vertigo or throat pain (-);  neck stiffness (-)     All other review of systems is negative unless indicated above.   -------------------------------------------------------------------------------------------  VITALS:  T(C): 36.6 (03-08-24 @ 19:00), Max: 37.7 (03-08-24 @ 18:29)  HR: 85 (03-08-24 @ 22:00) (85 - 113)  BP: 114/64 (03-08-24 @ 22:00) (102/56 - 152/90)  RR: 30 (03-08-24 @ 22:00) (13 - 30)  SpO2: 95% (03-08-24 @ 22:00) (93% - 100%)  Wt(kg): --  I&O's Summary    08 Mar 2024 07:01  -  08 Mar 2024 22:43  --------------------------------------------------------  IN: 890 mL / OUT: 0 mL / NET: 890 mL        PHYSICAL EXAM:  GEN: NAD, sitting in bed  HEENT: NCAT, EOMI  CV: RRR, Ns1/s2, no m/r/g, JVP not elevated  RESP: CTA B/l, no w/r/r  ABD: soft, nt, nd  EXT: warm, 2+ pulses, no edema  SKIN: no visible lesions, rashes  NEURO: AAOx3, no focal deficits  PSYCH: Calm, cooperative    -------------------------------------------------------------------------------------------  LABS:                          13.3   7.32  )-----------( 199      ( 08 Mar 2024 19:05 )             39.4     03-08    138  |  100  |  15  ----------------------------<  58<L>  3.8   |  21<L>  |  0.76    Ca    9.3      08 Mar 2024 19:05  Phos  3.1     03-08  Mg     1.9     03-08    TPro  7.1  /  Alb  4.1  /  TBili  0.7  /  DBili  x   /  AST  36  /  ALT  19  /  AlkPhos  100  03-08    PT/INR - ( 08 Mar 2024 19:05 )   PT: 10.3 sec;   INR: 0.98 ratio         PTT - ( 08 Mar 2024 19:05 )  PTT:30.0 sec  CARDIAC MARKERS ( 08 Mar 2024 16:34 )  <6 ng/L / x     / x     / x     / x     / x      CARDIAC MARKERS ( 08 Mar 2024 12:23 )  8 ng/L / x     / x     / 291 U/L / x     / x      CARDIAC MARKERS ( 08 Mar 2024 08:42 )  x     / x     / x     / 304 U/L / x     / x              acetaminophen   IVPB .. 1000 milliGRAM(s) IV Intermittent every 6 hours  HYDROmorphone  Injectable 0.5 milliGRAM(s) IV Push every 3 hours PRN  oxyCODONE    IR 2.5 milliGRAM(s) Oral every 4 hours PRN  oxyCODONE    IR 5 milliGRAM(s) Oral every 4 hours PRN    -------------------------------------------------------------------------------------------  Meds:  pantoprazole  Injectable 40 milliGRAM(s) IV Push every 24 hours    -------------------------------------------------------------------------------------------  Cardiovascular Diagnostic Testing:      -------------------------------------------------------------------------------------------  Assessment and Plan:   63-year-old female history of gastric bypass, hypertension, hyperlipidemia, depression, social EtOH use, transferred from [facility] for C5-C6 vertebral body fracture.  Yesterday patient got up to go to the kitchen, felt lightheaded, had a syncopal episode and fell on the floor and laid on the floor all night because of right arm and right leg weakness.  Eventually was able to make a call and presented to the ED at [facility].  Continues to complain of right upper lower extremity weakness, and bilateral shoulder pain.  Denies chest pain or shortness of breath, nausea vomiting, also complains of abdominal upper back pain. Patient was transferred to [facility] for further treatment. Patient is scheduled for a C3-7 decompression.    #Syncope  Seems unlikely cardiac syncope given lightheadedness preceeding fall. TTE wnl, EKG wnl, lytes wnl and cardiac enzymes all wnl. Low suspicion for cardiogenic causes. No contraindication to proceed to surgery.    Recommendations:  Cardiovascular Risk Stratification - RCRI = 0  (0 predictors = 0.4%, 1 predictor = 0.9%, 2 predictors = 6.6%, =3 predictors = >11%)    Functional Status:  >4 METS (Description of maximal physical acitivity)/Unable to Assess.     Overall this patient is as 0.4% risk (for cardiac death, nonfatal myocardial infarction, and nonfatal cardiac arrest perioperatively for this intermediate risk procedure. Patient currently does not show any clinical evidence of decompensated heart failure, cardiac arrythmias, or active ischemia.  According to ACC/AHA 2014 Guideline on Perioperative Cardiovascular Evaluation and Management (Circulation. 2014;130:e278–e333), no further cardiac testing is recommended. May proceed after discussion and shared-decision making with regarding the risk, benefits, and alternatives to the procedure by procedure-performing physician with patient or surrogate decision maker.        *** Recommendations are preliminary until cosigned by the attending.    Ji Ortega MD  Cardiology Fellow    For all New Consults and Questions:  [email or web address]   Login: [employer]

## 2024-03-08 NOTE — ED PROVIDER NOTE - CLINICAL SUMMARY MEDICAL DECISION MAKING FREE TEXT BOX
Patient with syncopal fall, transferred for C-spine fracture, discussed with orthospine who is recommending stat MRI, discussed with MRI and will obtain MRI emergently.  Will also get x-rays of the right wrist rule out scaphoid fracture and give thumb spica.  No chest wall tenderness to palpation with patient with upper thoracic tenderness to palpation, will get CTs as well, pain control, reassess. Patient with syncopal fall, transferred for C-spine fracture, discussed with orthospine who is recommending stat MRI, discussed with MRI and will obtain MRI emergently.  Will also get x-rays of the right wrist rule out scaphoid fracture and give thumb spica.  No chest wall tenderness to palpation with patient with upper thoracic tenderness to palpation, will get CTs as well, pain control, reassess.    Nikita Max PGY3: 63F transferred from [place] after syncopal episode with C5-C6 vertebral body fractures. episode occurred last night after patient stood up to walk to another room, no pre-dromal symptoms, pt was unable to get off the floor until morning due to neck pain with R sided UE/LE weakness, and b/l shoulder pain. BIBA to [place]. C-collar in place, avss, in mild pain but no acute distress, +RUE/RLE weakness, +b/l shoulder ttp, +R snuffbox ttp. Pt to see ortho spine/trauma here, likely will require additional imaging and pain control, will reassess.

## 2024-03-08 NOTE — ED PROVIDER NOTE - CLINICAL SUMMARY MEDICAL DECISION MAKING FREE TEXT BOX
here after syncope with right sided numbness and weakness. check labs, ct hydrate, treat symptoms, admit.

## 2024-03-08 NOTE — ED ADULT NURSE NOTE - NSICDXPASTSURGICALHX_GEN_ALL_CORE_FT
PAST SURGICAL HISTORY:  H/O gastric bypass     Personal History of Gastric Bypass     S/P Cholecystectomy     S/P Hernia Repair ventral hernia

## 2024-03-08 NOTE — CONSULT NOTE ADULT - ATTENDING COMMENTS
Patient seen and examined. Agree with assessment and plan as outlined above. Patient s/p fall likely syncope with cervical spine fracture with plan for surgery. Patient has not had prior syncope. No clear prodrome but was emptying the  with a lot of bending and standing. She has h/p RBBB. No other cardiac history. She denies chest pain, SOB, palpitations. ECG without ischemic changes. Echo NL EF with no significant valve disease. Trop negative. Unclear cause of syncope at this time. As outlined above, given prior functional status, RCRI score no objection to planned surgery. Additionally patient follow regularly with a cardiologist in [place] and has had stress with in two years which was normal according to the patient.
63F syncope last night, and prolonged recumbency until this morning,  transferred from [place],  seen and examined 03-08-24 @ 1545 as trauma consult.    8/14/2007 @ [place] - federico-en-Y gastric  bypass  7/21/2008 @ [place] - laparoscopic cholecystectomy  9/4/2010 @ [place] - ex-lap / MARVIN for SBO    GCS = 15  hemodynamically stable  SpO2 = 98% on 2 lpm nasal cannula  NC/AT  c-collar in place  no chest wall tenderness  soft / NT / ND  midline scar with umbilicus absent (abdominoplasty)  pelvic girdle stable  no deformity x 4 extremities  tender over right 1st metacarpal bone  LUE - 4/5 strength  RUE - 3/5 strength  LLE - 5/5 strength  RLE - 3/5 strength    WBC - 9  Cr - 0.6  CPK - 304 -> 291    CT head / c-spine - no intracranial hemorrhage. C5 and C6 anterior inferior endplate fractures  CTA brain / neck - no BCVI  CT chest w/o contrast - no traumatic injuries    CXR - no pneumothorax, hemothorax or displaced rib fractures  left shoulder / humerus X-rays - no fractures  right shoulder / humerus / wrist / hand X-rays - no fractures    MRI c/t/l-spine - severe stenosis with cord compression at C3-4, C4-5 and C5-6 levels without signal abnormality in the cord.      C5 and C6 anterior inferior endplate fractures with partial cord injury  -care are per ortho spine surgery      I reviewed her labs and radiologic images.  care coordinated with ED and SICU teams.  plan discussed with ortho spine attending by phone.

## 2024-03-08 NOTE — CONSULT NOTE ADULT - PROBLEM SELECTOR RECOMMENDATION 2
Continue to monitor rate  follow for arrythmia  cardiology to see the Patient  May need an echo prior to suirgery

## 2024-03-08 NOTE — ED PROVIDER NOTE - OBJECTIVE STATEMENT
Dr. Montemayor: 63-year-old female history of gastric bypass, hypertension, hyperlipidemia, depression, social EtOH use, transferred from [place] for C5-C6 vertebral body fracture.  Yesterday patient got up to go to the kitchen, felt lightheaded, had a syncopal episode and fell on the floor and laid on the floor all night because of right arm and right leg weakness.  Eventually was able to make a call and presented to the ED at [place].  Continues to complain of right upper lower extremity weakness, and bilateral shoulder pain.  Denies chest pain or shortness of breath, nausea vomiting, also complains of abdominal upper back pain.

## 2024-03-08 NOTE — ED ADULT NURSE NOTE - NSFALLRISKINTERV_ED_ALL_ED
Communicate fall risk and risk factors to all staff, patient, and family/Provide visual cue: yellow wristband, yellow gown, etc/Reinforce activity limits and safety measures with patient and family/Call bell, personal items and telephone in reach/Instruct patient to call for assistance before getting out of bed/chair/stretcher/Non-slip footwear applied when patient is off stretcher/[place] to call system/Physically safe environment - no spills, clutter or unnecessary equipment/Purposeful Proactive Rounding/Room/bathroom lighting operational, light cord in reach Assistance OOB with selected safe patient handling equipment if applicable/Assistance with ambulation/Communicate fall risk and risk factors to all staff, patient, and family/Monitor gait and stability/Provide patient with walking aids/Provide visual cue: yellow wristband, yellow gown, etc/Reinforce activity limits and safety measures with patient and family/Call bell, personal items and telephone in reach/Instruct patient to call for assistance before getting out of bed/chair/stretcher/Non-slip footwear applied when patient is off stretcher/[place] to call system/Physically safe environment - no spills, clutter or unnecessary equipment/Purposeful Proactive Rounding/Room/bathroom lighting operational, light cord in reach

## 2024-03-08 NOTE — ED PROVIDER NOTE - WR INTERPRETATION 2
Detail Level: Detailed
Quality 431: Preventive Care And Screening: Unhealthy Alcohol Use - Screening: Patient not identified as an unhealthy alcohol user when screened for unhealthy alcohol use using a systematic screening method
Quality 130: Documentation Of Current Medications In The Medical Record: Current Medications Documented
Quality 226: Preventive Care And Screening: Tobacco Use: Screening And Cessation Intervention: Patient screened for tobacco use and is an ex/non-smoker
no fracture or dislocation

## 2024-03-08 NOTE — CONSULT NOTE ADULT - SUBJECTIVE AND OBJECTIVE BOX
SURGERY CONSULT NOTE     HISTORY OF PRESENT ILLNESS:  HPI: Patient is a 63y Female PMH HTN, HLD, depression, gastric bypass who presents c/o cervical pain and RUE weakness. Per patient, she had a syncopal fall last night and does not recall the events; had LOC. When she woke up, had severe pain and noticed new onset pain and weakness in the RUE. She endorses severe pain in bilateral shoulders and arms. Denies other numbness/tingling/paresthesias/weakness. Denies bowel/bladder incontinence. Denies fevers/chills. No other complaints at this time. She has new onset bilateral upper extremity weakness, R>L with acute C5-6 cervical fractures, no other injuries. Patient transferred to SICU for q1 hour Neuro checks      VITAL SIGNS:  ICU Vital Signs Last 24 Hrs  T(C): 36.6 (08 Mar 2024 19:00), Max: 37.7 (08 Mar 2024 18:29)  T(F): 97.9 (08 Mar 2024 19:00), Max: 99.9 (08 Mar 2024 18:29)  HR: 99 (08 Mar 2024 20:00) (93 - 113)  BP: 138/74 (08 Mar 2024 20:00) (105/64 - 152/90)  BP(mean): 99 (08 Mar 2024 20:00) (76 - 99)  ABP: --  ABP(mean): --  RR: 24 (08 Mar 2024 20:00) (13 - 24)  SpO2: 100% (08 Mar 2024 20:00) (93% - 100%)    O2 Parameters below as of 08 Mar 2024 19:00  Patient On (Oxygen Delivery Method): room air            PHYSICAL EXAM:  Gen: NAD  Neuro: A&Ox3  Resp: no increased WOB, satting well on RA  Cardiac: appears well perfused, extremities warm  Abd: soft, nondistended    LABS:     IMAGING STUDIES:

## 2024-03-08 NOTE — ED PROVIDER NOTE - ATTENDING CONTRIBUTION TO CARE
Patient was critically ill with a high probability of imminent or life threatening deterioration.  I have performed direct patient care (not related to procedure), additional history taking, interpretation of diagnostic studies, documentation, consultation with other physicians, telephone consultation with the patient's family  I have personally and independently provided the amount of critical care time documented below excluding time spent on separate procedures.  32 mins    Dr. Montemaoyr: I have personally performed a face to face bedside history and physical examination of this patient. I have discussed the history, examination, review of systems, assessment and plan of management with the resident. I have reviewed the electronic medical record and amended it to reflect my history, review of systems, physical exam, assessment and plan.    Dr. Montemayor: This H&P has been written by myself in its entirety

## 2024-03-08 NOTE — CONSULT NOTE ADULT - ASSESSMENT
62 yo woman s/p a syncopal episode presents with severe central canal stenosis and cord compression at C3-C4, C4-C5 and C5-C6 after a syncopal episide

## 2024-03-09 LAB
ALBUMIN SERPL ELPH-MCNC: 3.8 G/DL — SIGNIFICANT CHANGE UP (ref 3.3–5)
ALBUMIN SERPL ELPH-MCNC: 3.9 G/DL — SIGNIFICANT CHANGE UP (ref 3.3–5)
ALP SERPL-CCNC: 79 U/L — SIGNIFICANT CHANGE UP (ref 40–120)
ALP SERPL-CCNC: 96 U/L — SIGNIFICANT CHANGE UP (ref 40–120)
ALT FLD-CCNC: 14 U/L — SIGNIFICANT CHANGE UP (ref 10–45)
ALT FLD-CCNC: 17 U/L — SIGNIFICANT CHANGE UP (ref 10–45)
ANION GAP SERPL CALC-SCNC: 13 MMOL/L — SIGNIFICANT CHANGE UP (ref 5–17)
ANION GAP SERPL CALC-SCNC: 14 MMOL/L — SIGNIFICANT CHANGE UP (ref 5–17)
APTT BLD: 28.5 SEC — SIGNIFICANT CHANGE UP (ref 24.5–35.6)
APTT BLD: 30.8 SEC — SIGNIFICANT CHANGE UP (ref 24.5–35.6)
AST SERPL-CCNC: 25 U/L — SIGNIFICANT CHANGE UP (ref 10–40)
AST SERPL-CCNC: 32 U/L — SIGNIFICANT CHANGE UP (ref 10–40)
BASE EXCESS BLDV CALC-SCNC: -0.7 MMOL/L — SIGNIFICANT CHANGE UP (ref -2–3)
BILIRUB SERPL-MCNC: 0.3 MG/DL — SIGNIFICANT CHANGE UP (ref 0.2–1.2)
BILIRUB SERPL-MCNC: 0.5 MG/DL — SIGNIFICANT CHANGE UP (ref 0.2–1.2)
BLD GP AB SCN SERPL QL: NEGATIVE — SIGNIFICANT CHANGE UP
BUN SERPL-MCNC: 12 MG/DL — SIGNIFICANT CHANGE UP (ref 7–23)
BUN SERPL-MCNC: 9 MG/DL — SIGNIFICANT CHANGE UP (ref 7–23)
CA-I SERPL-SCNC: 1.21 MMOL/L — SIGNIFICANT CHANGE UP (ref 1.15–1.33)
CALCIUM SERPL-MCNC: 8.4 MG/DL — SIGNIFICANT CHANGE UP (ref 8.4–10.5)
CALCIUM SERPL-MCNC: 9 MG/DL — SIGNIFICANT CHANGE UP (ref 8.4–10.5)
CHLORIDE BLDV-SCNC: 103 MMOL/L — SIGNIFICANT CHANGE UP (ref 96–108)
CHLORIDE SERPL-SCNC: 100 MMOL/L — SIGNIFICANT CHANGE UP (ref 96–108)
CHLORIDE SERPL-SCNC: 101 MMOL/L — SIGNIFICANT CHANGE UP (ref 96–108)
CO2 BLDV-SCNC: 25 MMOL/L — SIGNIFICANT CHANGE UP (ref 22–26)
CO2 SERPL-SCNC: 22 MMOL/L — SIGNIFICANT CHANGE UP (ref 22–31)
CO2 SERPL-SCNC: 23 MMOL/L — SIGNIFICANT CHANGE UP (ref 22–31)
CREAT SERPL-MCNC: 0.61 MG/DL — SIGNIFICANT CHANGE UP (ref 0.5–1.3)
CREAT SERPL-MCNC: 0.68 MG/DL — SIGNIFICANT CHANGE UP (ref 0.5–1.3)
EGFR: 102 ML/MIN/1.73M2 — SIGNIFICANT CHANGE UP
EGFR: 99 ML/MIN/1.73M2 — SIGNIFICANT CHANGE UP
GAS PNL BLDA: SIGNIFICANT CHANGE UP
GAS PNL BLDV: 131 MMOL/L — LOW (ref 136–145)
GAS PNL BLDV: SIGNIFICANT CHANGE UP
GLUCOSE BLDC GLUCOMTR-MCNC: 111 MG/DL — HIGH (ref 70–99)
GLUCOSE BLDV-MCNC: 108 MG/DL — HIGH (ref 70–99)
GLUCOSE SERPL-MCNC: 108 MG/DL — HIGH (ref 70–99)
GLUCOSE SERPL-MCNC: 142 MG/DL — HIGH (ref 70–99)
HCO3 BLDV-SCNC: 24 MMOL/L — SIGNIFICANT CHANGE UP (ref 22–29)
HCT VFR BLD CALC: 36.4 % — SIGNIFICANT CHANGE UP (ref 34.5–45)
HCT VFR BLD CALC: 37.9 % — SIGNIFICANT CHANGE UP (ref 34.5–45)
HCT VFR BLDA CALC: 39 % — SIGNIFICANT CHANGE UP (ref 34.5–46.5)
HCV AB S/CO SERPL IA: 0.07 S/CO — SIGNIFICANT CHANGE UP (ref 0–0.99)
HCV AB SERPL-IMP: SIGNIFICANT CHANGE UP
HGB BLD CALC-MCNC: 13 G/DL — SIGNIFICANT CHANGE UP (ref 11.7–16.1)
HGB BLD-MCNC: 11.7 G/DL — SIGNIFICANT CHANGE UP (ref 11.5–15.5)
HGB BLD-MCNC: 12.7 G/DL — SIGNIFICANT CHANGE UP (ref 11.5–15.5)
HOROWITZ INDEX BLDV+IHG-RTO: 21 — SIGNIFICANT CHANGE UP
INR BLD: 0.91 RATIO — SIGNIFICANT CHANGE UP (ref 0.85–1.18)
INR BLD: 0.93 RATIO — SIGNIFICANT CHANGE UP (ref 0.85–1.18)
LACTATE BLDV-MCNC: 0.7 MMOL/L — SIGNIFICANT CHANGE UP (ref 0.5–2)
MAGNESIUM SERPL-MCNC: 2.2 MG/DL — SIGNIFICANT CHANGE UP (ref 1.6–2.6)
MAGNESIUM SERPL-MCNC: 2.5 MG/DL — SIGNIFICANT CHANGE UP (ref 1.6–2.6)
MCHC RBC-ENTMCNC: 31.4 PG — SIGNIFICANT CHANGE UP (ref 27–34)
MCHC RBC-ENTMCNC: 32 PG — SIGNIFICANT CHANGE UP (ref 27–34)
MCHC RBC-ENTMCNC: 32.1 GM/DL — SIGNIFICANT CHANGE UP (ref 32–36)
MCHC RBC-ENTMCNC: 33.5 GM/DL — SIGNIFICANT CHANGE UP (ref 32–36)
MCV RBC AUTO: 95.5 FL — SIGNIFICANT CHANGE UP (ref 80–100)
MCV RBC AUTO: 97.6 FL — SIGNIFICANT CHANGE UP (ref 80–100)
NRBC # BLD: 0 /100 WBCS — SIGNIFICANT CHANGE UP (ref 0–0)
NRBC # BLD: 0 /100 WBCS — SIGNIFICANT CHANGE UP (ref 0–0)
PCO2 BLDV: 40 MMHG — SIGNIFICANT CHANGE UP (ref 39–42)
PH BLDV: 7.39 — SIGNIFICANT CHANGE UP (ref 7.32–7.43)
PHOSPHATE SERPL-MCNC: 3.1 MG/DL — SIGNIFICANT CHANGE UP (ref 2.5–4.5)
PHOSPHATE SERPL-MCNC: 3.2 MG/DL — SIGNIFICANT CHANGE UP (ref 2.5–4.5)
PLATELET # BLD AUTO: 165 K/UL — SIGNIFICANT CHANGE UP (ref 150–400)
PLATELET # BLD AUTO: 173 K/UL — SIGNIFICANT CHANGE UP (ref 150–400)
PO2 BLDV: 122 MMHG — HIGH (ref 25–45)
POTASSIUM BLDV-SCNC: 4.4 MMOL/L — SIGNIFICANT CHANGE UP (ref 3.5–5.1)
POTASSIUM SERPL-MCNC: 4.2 MMOL/L — SIGNIFICANT CHANGE UP (ref 3.5–5.3)
POTASSIUM SERPL-MCNC: 4.5 MMOL/L — SIGNIFICANT CHANGE UP (ref 3.5–5.3)
POTASSIUM SERPL-SCNC: 4.2 MMOL/L — SIGNIFICANT CHANGE UP (ref 3.5–5.3)
POTASSIUM SERPL-SCNC: 4.5 MMOL/L — SIGNIFICANT CHANGE UP (ref 3.5–5.3)
PROT SERPL-MCNC: 6.4 G/DL — SIGNIFICANT CHANGE UP (ref 6–8.3)
PROT SERPL-MCNC: 6.8 G/DL — SIGNIFICANT CHANGE UP (ref 6–8.3)
PROTHROM AB SERPL-ACNC: 10 SEC — SIGNIFICANT CHANGE UP (ref 9.5–13)
PROTHROM AB SERPL-ACNC: 9.8 SEC — SIGNIFICANT CHANGE UP (ref 9.5–13)
RBC # BLD: 3.73 M/UL — LOW (ref 3.8–5.2)
RBC # BLD: 3.97 M/UL — SIGNIFICANT CHANGE UP (ref 3.8–5.2)
RBC # FLD: 13.1 % — SIGNIFICANT CHANGE UP (ref 10.3–14.5)
RBC # FLD: 13.2 % — SIGNIFICANT CHANGE UP (ref 10.3–14.5)
RH IG SCN BLD-IMP: POSITIVE — SIGNIFICANT CHANGE UP
SAO2 % BLDV: 96.1 % — HIGH (ref 67–88)
SODIUM SERPL-SCNC: 136 MMOL/L — SIGNIFICANT CHANGE UP (ref 135–145)
SODIUM SERPL-SCNC: 137 MMOL/L — SIGNIFICANT CHANGE UP (ref 135–145)
WBC # BLD: 13.35 K/UL — HIGH (ref 3.8–10.5)
WBC # BLD: 6.62 K/UL — SIGNIFICANT CHANGE UP (ref 3.8–10.5)
WBC # FLD AUTO: 13.35 K/UL — HIGH (ref 3.8–10.5)
WBC # FLD AUTO: 6.62 K/UL — SIGNIFICANT CHANGE UP (ref 3.8–10.5)

## 2024-03-09 PROCEDURE — 22842 INSERT SPINE FIXATION DEVICE: CPT

## 2024-03-09 PROCEDURE — 63048 LAM FACETEC &FORAMOT EA ADDL: CPT

## 2024-03-09 PROCEDURE — 99291 CRITICAL CARE FIRST HOUR: CPT

## 2024-03-09 PROCEDURE — 22326 TREAT NECK SPINE FRACTURE: CPT

## 2024-03-09 PROCEDURE — 22614 ARTHRD PST TQ 1NTRSPC EA ADD: CPT

## 2024-03-09 PROCEDURE — 99223 1ST HOSP IP/OBS HIGH 75: CPT | Mod: GC

## 2024-03-09 PROCEDURE — 63045 LAM FACETEC & FORAMOT CRV: CPT | Mod: 59

## 2024-03-09 PROCEDURE — 99223 1ST HOSP IP/OBS HIGH 75: CPT | Mod: 25,57

## 2024-03-09 PROCEDURE — 22600 ARTHRD PST TQ 1NTRSPC CRV: CPT

## 2024-03-09 DEVICE — SCREW POLYAXIAL 3.5X10MM: Type: IMPLANTABLE DEVICE | Status: FUNCTIONAL

## 2024-03-09 DEVICE — SCREW SET YUKON: Type: IMPLANTABLE DEVICE | Status: FUNCTIONAL

## 2024-03-09 DEVICE — SURGIFLO MATRIX WITH THROMBIN KIT: Type: IMPLANTABLE DEVICE | Status: FUNCTIONAL

## 2024-03-09 DEVICE — SCREW POLYAXIAL 3.5X12MM: Type: IMPLANTABLE DEVICE | Status: FUNCTIONAL

## 2024-03-09 DEVICE — SURGIFOAM PAD 8CM X 12.5CM X 10MM (100): Type: IMPLANTABLE DEVICE | Status: FUNCTIONAL

## 2024-03-09 DEVICE — ROD CONTOURED 3.5X75MM: Type: IMPLANTABLE DEVICE | Status: FUNCTIONAL

## 2024-03-09 DEVICE — BONE WAX 2.5GM: Type: IMPLANTABLE DEVICE | Status: FUNCTIONAL

## 2024-03-09 DEVICE — ROD YUKON CONTOUR 3.5X65MM: Type: IMPLANTABLE DEVICE | Status: FUNCTIONAL

## 2024-03-09 DEVICE — KIT A-LINE 1LUM 20G X 12CM SAFE KIT: Type: IMPLANTABLE DEVICE | Status: FUNCTIONAL

## 2024-03-09 RX ORDER — NALOXONE HYDROCHLORIDE 4 MG/.1ML
0.1 SPRAY NASAL
Refills: 0 | Status: DISCONTINUED | OUTPATIENT
Start: 2024-03-09 | End: 2024-03-16

## 2024-03-09 RX ORDER — HYDROMORPHONE HYDROCHLORIDE 2 MG/ML
0.5 INJECTION INTRAMUSCULAR; INTRAVENOUS; SUBCUTANEOUS ONCE
Refills: 0 | Status: DISCONTINUED | OUTPATIENT
Start: 2024-03-09 | End: 2024-03-09

## 2024-03-09 RX ORDER — DEXAMETHASONE 0.5 MG/5ML
8 ELIXIR ORAL EVERY 8 HOURS
Refills: 0 | Status: COMPLETED | OUTPATIENT
Start: 2024-03-10 | End: 2024-03-11

## 2024-03-09 RX ORDER — DEXAMETHASONE 0.5 MG/5ML
3 ELIXIR ORAL EVERY 6 HOURS
Refills: 0 | Status: COMPLETED | OUTPATIENT
Start: 2024-03-11 | End: 2024-03-12

## 2024-03-09 RX ORDER — ACETAMINOPHEN 500 MG
1000 TABLET ORAL EVERY 6 HOURS
Refills: 0 | Status: COMPLETED | OUTPATIENT
Start: 2024-03-09 | End: 2024-03-10

## 2024-03-09 RX ORDER — CEFAZOLIN SODIUM 1 G
2000 VIAL (EA) INJECTION EVERY 8 HOURS
Refills: 0 | Status: COMPLETED | OUTPATIENT
Start: 2024-03-10 | End: 2024-03-10

## 2024-03-09 RX ORDER — HYDROMORPHONE HYDROCHLORIDE 2 MG/ML
30 INJECTION INTRAMUSCULAR; INTRAVENOUS; SUBCUTANEOUS
Refills: 0 | Status: DISCONTINUED | OUTPATIENT
Start: 2024-03-09 | End: 2024-03-10

## 2024-03-09 RX ORDER — DEXAMETHASONE 0.5 MG/5ML
2 ELIXIR ORAL EVERY 6 HOURS
Refills: 0 | Status: COMPLETED | OUTPATIENT
Start: 2024-03-12 | End: 2024-03-13

## 2024-03-09 RX ORDER — DEXAMETHASONE 0.5 MG/5ML
ELIXIR ORAL
Refills: 0 | Status: COMPLETED | OUTPATIENT
Start: 2024-03-11 | End: 2024-03-14

## 2024-03-09 RX ORDER — DEXAMETHASONE 0.5 MG/5ML
1 ELIXIR ORAL EVERY 6 HOURS
Refills: 0 | Status: COMPLETED | OUTPATIENT
Start: 2024-03-13 | End: 2024-03-14

## 2024-03-09 RX ORDER — BUTORPHANOL TARTRATE 2 MG/ML
0.25 INJECTION, SOLUTION INTRAMUSCULAR; INTRAVENOUS EVERY 6 HOURS
Refills: 0 | Status: DISCONTINUED | OUTPATIENT
Start: 2024-03-09 | End: 2024-03-09

## 2024-03-09 RX ORDER — ONDANSETRON 8 MG/1
4 TABLET, FILM COATED ORAL EVERY 6 HOURS
Refills: 0 | Status: DISCONTINUED | OUTPATIENT
Start: 2024-03-09 | End: 2024-03-16

## 2024-03-09 RX ORDER — SODIUM CHLORIDE 9 MG/ML
1000 INJECTION INTRAMUSCULAR; INTRAVENOUS; SUBCUTANEOUS
Refills: 0 | Status: DISCONTINUED | OUTPATIENT
Start: 2024-03-09 | End: 2024-03-10

## 2024-03-09 RX ORDER — CHLORHEXIDINE GLUCONATE 213 G/1000ML
1 SOLUTION TOPICAL
Refills: 0 | Status: DISCONTINUED | OUTPATIENT
Start: 2024-03-09 | End: 2024-03-16

## 2024-03-09 RX ORDER — CALCIUM GLUCONATE 100 MG/ML
2 VIAL (ML) INTRAVENOUS ONCE
Refills: 0 | Status: COMPLETED | OUTPATIENT
Start: 2024-03-09 | End: 2024-03-09

## 2024-03-09 RX ADMIN — Medication 400 MILLIGRAM(S): at 20:35

## 2024-03-09 RX ADMIN — HYDROMORPHONE HYDROCHLORIDE 0.5 MILLIGRAM(S): 2 INJECTION INTRAMUSCULAR; INTRAVENOUS; SUBCUTANEOUS at 19:25

## 2024-03-09 RX ADMIN — HYDROMORPHONE HYDROCHLORIDE 30 MILLILITER(S): 2 INJECTION INTRAMUSCULAR; INTRAVENOUS; SUBCUTANEOUS at 20:54

## 2024-03-09 RX ADMIN — Medication 400 MILLIGRAM(S): at 23:00

## 2024-03-09 RX ADMIN — SODIUM CHLORIDE 20 MILLILITER(S): 9 INJECTION INTRAMUSCULAR; INTRAVENOUS; SUBCUTANEOUS at 21:05

## 2024-03-09 RX ADMIN — HYDROMORPHONE HYDROCHLORIDE 0.5 MILLIGRAM(S): 2 INJECTION INTRAMUSCULAR; INTRAVENOUS; SUBCUTANEOUS at 12:23

## 2024-03-09 RX ADMIN — SODIUM CHLORIDE 75 MILLILITER(S): 9 INJECTION, SOLUTION INTRAVENOUS at 07:52

## 2024-03-09 RX ADMIN — HYDROMORPHONE HYDROCHLORIDE 0.5 MILLIGRAM(S): 2 INJECTION INTRAMUSCULAR; INTRAVENOUS; SUBCUTANEOUS at 12:53

## 2024-03-09 RX ADMIN — HYDROMORPHONE HYDROCHLORIDE 0.5 MILLIGRAM(S): 2 INJECTION INTRAMUSCULAR; INTRAVENOUS; SUBCUTANEOUS at 07:52

## 2024-03-09 RX ADMIN — Medication 1000 MILLIGRAM(S): at 05:40

## 2024-03-09 RX ADMIN — HYDROMORPHONE HYDROCHLORIDE 0.5 MILLIGRAM(S): 2 INJECTION INTRAMUSCULAR; INTRAVENOUS; SUBCUTANEOUS at 00:00

## 2024-03-09 RX ADMIN — SODIUM CHLORIDE 75 MILLILITER(S): 9 INJECTION, SOLUTION INTRAVENOUS at 05:13

## 2024-03-09 RX ADMIN — HYDROMORPHONE HYDROCHLORIDE 0.5 MILLIGRAM(S): 2 INJECTION INTRAMUSCULAR; INTRAVENOUS; SUBCUTANEOUS at 08:22

## 2024-03-09 RX ADMIN — Medication 200 GRAM(S): at 21:17

## 2024-03-09 RX ADMIN — HYDROMORPHONE HYDROCHLORIDE 0.5 MILLIGRAM(S): 2 INJECTION INTRAMUSCULAR; INTRAVENOUS; SUBCUTANEOUS at 19:40

## 2024-03-09 RX ADMIN — Medication 25 GRAM(S): at 00:07

## 2024-03-09 RX ADMIN — HYDROMORPHONE HYDROCHLORIDE 0.5 MILLIGRAM(S): 2 INJECTION INTRAMUSCULAR; INTRAVENOUS; SUBCUTANEOUS at 00:15

## 2024-03-09 RX ADMIN — HYDROMORPHONE HYDROCHLORIDE 0.5 MILLIGRAM(S): 2 INJECTION INTRAMUSCULAR; INTRAVENOUS; SUBCUTANEOUS at 20:35

## 2024-03-09 RX ADMIN — HYDROMORPHONE HYDROCHLORIDE 0.5 MILLIGRAM(S): 2 INJECTION INTRAMUSCULAR; INTRAVENOUS; SUBCUTANEOUS at 03:05

## 2024-03-09 RX ADMIN — HYDROMORPHONE HYDROCHLORIDE 0.5 MILLIGRAM(S): 2 INJECTION INTRAMUSCULAR; INTRAVENOUS; SUBCUTANEOUS at 20:20

## 2024-03-09 RX ADMIN — Medication 400 MILLIGRAM(S): at 05:10

## 2024-03-09 RX ADMIN — Medication 101.6 MILLIGRAM(S): at 05:13

## 2024-03-09 RX ADMIN — Medication 1000 MILLIGRAM(S): at 21:05

## 2024-03-09 RX ADMIN — CHLORHEXIDINE GLUCONATE 1 APPLICATION(S): 213 SOLUTION TOPICAL at 05:12

## 2024-03-09 RX ADMIN — Medication 100 MILLIGRAM(S): at 23:27

## 2024-03-09 RX ADMIN — Medication 101.6 MILLIGRAM(S): at 12:23

## 2024-03-09 RX ADMIN — OXYCODONE HYDROCHLORIDE 5 MILLIGRAM(S): 5 TABLET ORAL at 09:40

## 2024-03-09 RX ADMIN — HYDROMORPHONE HYDROCHLORIDE 0.5 MILLIGRAM(S): 2 INJECTION INTRAMUSCULAR; INTRAVENOUS; SUBCUTANEOUS at 03:20

## 2024-03-09 RX ADMIN — Medication 1000 MILLIGRAM(S): at 12:53

## 2024-03-09 RX ADMIN — CHLORHEXIDINE GLUCONATE 1 APPLICATION(S): 213 SOLUTION TOPICAL at 20:20

## 2024-03-09 RX ADMIN — Medication 400 MILLIGRAM(S): at 12:23

## 2024-03-09 RX ADMIN — OXYCODONE HYDROCHLORIDE 5 MILLIGRAM(S): 5 TABLET ORAL at 09:10

## 2024-03-09 RX ADMIN — Medication 1000 MILLIGRAM(S): at 23:30

## 2024-03-09 NOTE — CONSULT NOTE ADULT - SUBJECTIVE AND OBJECTIVE BOX
MELVI SCHMIDT  [id number]    63-year-old female history of gastric bypass, hypertension, hyperlipidemia, depression, social EtOH use, transferred from [place] for C5-C6 vertebral body fracture.  Yesterday patient got up to go to the kitchen, felt lightheaded, had a syncopal episode and fell on the floor and laid on the floor all night because of right arm and right leg weakness.  Eventually was able to make a call and presented to the ED at [place].  Continues to complain of right upper lower extremity weakness, and bilateral shoulder pain.  Denies chest pain or shortness of breath, nausea vomiting, also complains of abdominal upper back pain. Patient was transferred to [facility] for [facility] treatment. Patient is scheduled for a C3-7 decompression. Plastic surgery consulted intraop to evaluate patient for muscle flap reconstruction of posterior spine wound given wide exposure of spine structures, need for bilateral multilevel hardware placement, use of bone graft requring healthy vascularized muscle flap coverage of exposed vital stuctures and extensive foreign body.    Fracture of neck, unspecified, initial encounter    Handoff    MEWS Score    No pertinent past medical history    History of hypertension    Hypercholesterolemia    Central cord syndrome    Central cord syndrome    Cervical spine fracture    Stenosis of cervical spine with myelopathy    Syncope    HTN (hypertension)    Pain    HLD (hyperlipidemia)    GERD (gastroesophageal reflux disease)    Laminectomy with instrumented fusion of cervical spine by posterior approach    Personal History of Gastric Bypass    S/P Cholecystectomy    S/P Hernia Repair    H/O gastric bypass    RIGHT ARM PAIN, WEAKNESS        No Known Allergies      T(C): 37.3 (03-09-24 @ 11:00), Max: 37.3 (03-09-24 @ 11:00)  HR: 85 (03-09-24 @ 13:00) (80 - 100)  BP: 115/63 (03-09-24 @ 13:00) (102/56 - 138/74)  RR: 22 (03-09-24 @ 13:00) (13 - 41)  SpO2: 100% (03-09-24 @ 13:00) (94% - 100%)  Intubated, prone position  12 cm spine wound with exposure of spinal cord and bony spine, intact bilateral hardware and bone graft with denuded adjacent muscles and soft tissue.        Imaging: Reviewed.     A/P:  63 y.o with c-spine fracture s/p posterior spine decompression/fusion with muscle flap reconstruction.  - Diet  - Pain control  - IV abx  - Drain monitoring (1 HV deep at level of hardware)  - Ambulation as per Ortho   - DVT PPx: SCD, chemoprophylaxis as per spine service  - Will Follow    Thank You  Zakia Jason MD  Plastic Surgery

## 2024-03-09 NOTE — PROGRESS NOTE ADULT - SUBJECTIVE AND OBJECTIVE BOX
SUBJECTIVE: Patient seen and examined on AM rounds, prior to surgery. Patient reports that they're feeling well. Denies fever, chills. Reports pain as controlled. No new subjective concerns. Weakness remains consistent.     Vital Signs Last 24 Hrs  T(C): 37 (09 Mar 2024 09:19), Max: 37.7 (08 Mar 2024 18:29)  T(F): 98.6 (09 Mar 2024 07:00), Max: 99.9 (08 Mar 2024 18:29)  HR: 82 (09 Mar 2024 09:19) (80 - 113)  BP: 121/58 (09 Mar 2024 09:19) (102/56 - 138/74)  BP(mean): 84 (09 Mar 2024 09:19) (75 - 99)  RR: 18 (09 Mar 2024 09:19) (13 - 41)  SpO2: 99% (09 Mar 2024 09:19) (93% - 100%)    Parameters below as of 09 Mar 2024 07:00  Patient On (Oxygen Delivery Method): room air        General Appearance: Appears well, NAD  Neck: C-collar  Chest: Equal expansion bilaterally  CV: Pulse regular presently  Abdomen: Soft, nontense  Extremities: WWP; RUE not antigravity, weak in hand; LUE antigravity, mild weakness in hand; RLE weaker than LLE    I&O's Summary    08 Mar 2024 07:01  -  09 Mar 2024 07:00  --------------------------------------------------------  IN: 1915 mL / OUT: 1285 mL / NET: 630 mL    09 Mar 2024 06:01  -  09 Mar 2024 11:12  --------------------------------------------------------  IN: 175 mL / OUT: 100 mL / NET: 75 mL      I&O's Detail    08 Mar 2024 07:01  -  09 Mar 2024 07:00  --------------------------------------------------------  IN:    dextrose 5% + lactated ringers: 825 mL    IV PiggyBack: 500 mL    IV PiggyBack: 100 mL    Oral Fluid: 240 mL    sodium chloride 0.9%: 250 mL  Total IN: 1915 mL    OUT:    Indwelling Catheter - Urethral (mL): 1285 mL    Voided (mL): 0 mL  Total OUT: 1285 mL    Total NET: 630 mL      09 Mar 2024 06:01  -  09 Mar 2024 11:12  --------------------------------------------------------  IN:    dextrose 5% + lactated ringers: 150 mL    Oral Fluid: 25 mL  Total IN: 175 mL    OUT:    Indwelling Catheter - Urethral (mL): 100 mL  Total OUT: 100 mL    Total NET: 75 mL          LABS:                        12.7   6.62  )-----------( 173      ( 09 Mar 2024 02:41 )             37.9     03-09    137  |  101  |  12  ----------------------------<  108<H>  4.5   |  22  |  0.68    Ca    9.0      09 Mar 2024 02:41  Phos  3.1     03-09  Mg     2.5     03-09    TPro  6.8  /  Alb  3.8  /  TBili  0.5  /  DBili  x   /  AST  32  /  ALT  17  /  AlkPhos  96  03-09    PT/INR - ( 09 Mar 2024 02:41 )   PT: 9.8 sec;   INR: 0.93 ratio         PTT - ( 09 Mar 2024 02:41 )  PTT:30.8 sec  Urinalysis Basic - ( 09 Mar 2024 02:41 )    Color: x / Appearance: x / SG: x / pH: x  Gluc: 108 mg/dL / Ketone: x  / Bili: x / Urobili: x   Blood: x / Protein: x / Nitrite: x   Leuk Esterase: x / RBC: x / WBC x   Sq Epi: x / Non Sq Epi: x / Bacteria: x        RADIOLOGY & ADDITIONAL STUDIES:

## 2024-03-09 NOTE — PHYSICAL THERAPY INITIAL EVALUATION ADULT - ACTIVE RANGE OF MOTION EXAMINATION, REHAB EVAL
BLE AROM WFL, R shld flex NT 2* pain,R elbow flex/ext AAROM, R wrist/hand/digit ROM deferred 2* CT scan pending; JERILYN able to complete finger to finger grasp, AAROM L shld flex <90* limited 2* pain, elbow flex/ext AAROM L WFL

## 2024-03-09 NOTE — PHYSICAL THERAPY INITIAL EVALUATION ADULT - GROSSLY INTACT, SENSORY
intact sensation (slightly less on R thigh); tingling to BUE, numbness to RUE, intact to deep pressure R hand.

## 2024-03-09 NOTE — PROGRESS NOTE ADULT - SUBJECTIVE AND OBJECTIVE BOX
Orthopaedic Surgery: Post-Operative Note    Patient interviewed and examined at bedside in surgical ICU. In pain, but appropriate in acute post-operative context. Endorses continued numbness in the Right > Left upper and lower extremities. Denies fevers, chills, headaches, chest pain, or shortness of breath. Patient tolerated procedure well.     VITAL SIGNS  T(C): 36.8 (03-09-24 @ 19:00), Max: 37.3 (03-09-24 @ 11:00)  HR: 98 (03-09-24 @ 20:00) (80 - 109)  BP: 144/82 (03-09-24 @ 20:00) (102/56 - 159/91)  RR: 15 (03-09-24 @ 20:00) (12 - 41)  SpO2: 100% (03-09-24 @ 20:00) (94% - 100%)      LABS:                        11.7   13.35 )-----------( 165      ( 09 Mar 2024 20:34 )             36.4     03-09    137  |  101  |  12  ----------------------------<  108<H>  4.5   |  22  |  0.68    Ca    9.0      09 Mar 2024 02:41  Phos  3.1     03-09  Mg     2.5     03-09    TPro  6.8  /  Alb  3.8  /  TBili  0.5  /  DBili  x   /  AST  32  /  ALT  17  /  AlkPhos  96  03-09    PT/INR - ( 09 Mar 2024 20:34 )   PT: 10.0 sec;   INR: 0.91 ratio       PTT - ( 09 Mar 2024 20:34 )  PTT:28.5 sec        PHYSICAL EXAM  GEN: NAD, AAOx3    SPINE:  Cervical collar in place.   Grossly moving all extremities; Minimal put present volitional movement in the Right upper extremity.   SILT throughout all extremities; Endorses reduced sensation in the Right > Left upper and lower extremities.   + Radial pulses bilaterally.   + DP/PT pulses bilaterally.   No saddle anesthesia.       MOTOR EXAM:                          Elbow Flex (C5)     Wrist Ext (C6)     Elbow Ext (C7)      Finger Flex (C8)    Finger Abduction (T1)  RIGHT                 2/5                         2/5                         2/5                          3/5                              2/5  LEFT                    2/5                         2/5                         3/5                          4/5                              4/5                           Hip Flex (L2)      Knee Ext (L3)      Ank Dorsiflex (L4)     Hallux Ext (L5)     Ank PlantarFlex (S1)  RIGHT               4/5                      3/5                          4/5                            4/5                           4/5  LEFT                  5/5                      5/5                          5/5                            5/5                           5/5      SENSORY EXAM:                        C5      C6      C7      C8       T1          RIGHT          1         1        1         1         1          (0=absent, 1=impaired, 2=normal, NT=not testable)  LEFT             1         1        1         1         1          (0=absent, 1=impaired, 2=normal, NT=not testable)                        L2        L3       L4      L5       S1          RIGHT        1          1         1        1        1           (0=absent, 1=impaired, 2=normal, NT=not testable)  LEFT           1          1         1        1        1           (0=absent, 1=impaired, 2=normal, NT=not testable)    Positive Lombardi's sign on Right (index finger).   Positive Babinski on Right.   Three beats clonus bilaterally.         ASSESSMENT:  63y Female s/p C3-7 Laminectomy/PSF on 3/9/24; Stable.     PLAN:  - MAP goals > 85 mm Hg x 48 hours.   - Q1 neuroio checks.   - Pain control: PCA; Do not give Toradol.   - DVT Ppx: Hold in the setting of recent spinal surgery due to increased risk of epidural hematoma; Will consider Lovenox on POD2/POD3.    - Karina-operative Ancef x 24 hours.   - HMV drain in place draining sero-sanguinous fluid; Monitor output.   - Steroids: 48 hours IV Decadron followed by PO taper.    - WBAT / OOB with assistance as needed.   - Hard collar at all times.   - Michael.   - PT/OT.   - Encourage incentive spirometry.   - Will discuss with Dr. Layton and will advise if plan changes.

## 2024-03-09 NOTE — PATIENT PROFILE ADULT - VISION (WITH CORRECTIVE LENSES IF THE PATIENT USUALLY WEARS THEM):
You may return to your regular diet. Normal vision: sees adequately in most situations; can see medication labels, newsprint

## 2024-03-09 NOTE — PROGRESS NOTE ADULT - SUBJECTIVE AND OBJECTIVE BOX
63-year-old female history of gastric bypass, hypertension, hyperlipidemia, depression, social EtOH use, transferred from [place] for C5-C6 vertebral body fracture.  Yesterday patient got up to go to the kitchen, felt lightheaded, had a syncopal episode and fell on the floor and laid on the floor all night because of right arm and right leg weakness.  Eventually was able to make a call and presented to the ED at [place].  Continues to complain of right upper lower extremity weakness, and bilateral shoulder pain.  Denies chest pain or shortness of breath, nausea vomiting, also complains of abdominal upper back pain. Patient was transferred to [facility] for further treatment. Patient is scheduled for a C3-7 decompression. Patient states pain has been better controlled.      MEDICATIONS  (STANDING):  acetaminophen   IVPB .. 1000 milliGRAM(s) IV Intermittent every 6 hours  chlorhexidine 2% Cloths 1 Application(s) Topical <User Schedule>  dexAMETHasone  IVPB 8 milliGRAM(s) IV Intermittent every 6 hours  dextrose 5% + lactated ringers. 1000 milliLiter(s) (75 mL/Hr) IV Continuous <Continuous>  pantoprazole  Injectable 40 milliGRAM(s) IV Push every 24 hours    MEDICATIONS  (PRN):  HYDROmorphone  Injectable 0.5 milliGRAM(s) IV Push every 3 hours PRN breakthrough pain  oxyCODONE    IR 2.5 milliGRAM(s) Oral every 4 hours PRN Moderate Pain (4 - 6)  oxyCODONE    IR 5 milliGRAM(s) Oral every 4 hours PRN Severe Pain (7 - 10)          VITALS:   T(C): 37 (03-09-24 @ 09:19), Max: 37.7 (03-08-24 @ 18:29)  HR: 82 (03-09-24 @ 09:19) (80 - 113)  BP: 121/58 (03-09-24 @ 09:19) (102/56 - 138/74)  RR: 18 (03-09-24 @ 09:19) (13 - 41)  SpO2: 99% (03-09-24 @ 09:19) (93% - 100%)  Wt(kg): --    PHYSICAL EXAM:  GENERAL: NAD, well-groomed, well-developed  HEAD:  Atraumatic, Normocephalic  EYES: EOMI, PERRLA, conjunctiva and sclera clear  ENMT: No tonsillar erythema, exudates, or enlargement; Moist mucous membranes, Good dentition, No lesions  NECK: Supple, No JVD, Normal thyroid  NERVOUS SYSTEM:  Alert & Oriented X3, Good concentration ;right sided weakness   CHEST/LUNG: Clear to percussion bilaterally; No rales, rhonchi, wheezing, or rubs  HEART: Regular rate and rhythm; No murmurs, rubs, or gallops  ABDOMEN: Soft, Nontender, Nondistended; Bowel sounds present  EXTREMITIES:  2+ Peripheral Pulses, No clubbing, cyanosis, or edema  LYMPH: No lymphadenopathy noted  SKIN: No rashes or lesions    LABS:    CARDIAC MARKERS ( 08 Mar 2024 12:23 )  x     / x     / 291 U/L / x     / x      CARDIAC MARKERS ( 08 Mar 2024 08:42 )  x     / x     / 304 U/L / x     / x          CBC Full  -  ( 09 Mar 2024 02:41 )  WBC Count : 6.62 K/uL  RBC Count : 3.97 M/uL  Hemoglobin : 12.7 g/dL  Hematocrit : 37.9 %  Platelet Count - Automated : 173 K/uL  Mean Cell Volume : 95.5 fl  Mean Cell Hemoglobin : 32.0 pg  Mean Cell Hemoglobin Concentration : 33.5 gm/dL  Auto Neutrophil # : x  Auto Lymphocyte # : x  Auto Monocyte # : x  Auto Eosinophil # : x  Auto Basophil # : x  Auto Neutrophil % : x  Auto Lymphocyte % : x  Auto Monocyte % : x  Auto Eosinophil % : x  Auto Basophil % : x    03-09    137  |  101  |  12  ----------------------------<  108<H>  4.5   |  22  |  0.68    Ca    9.0      09 Mar 2024 02:41  Phos  3.1     03-09  Mg     2.5     03-09    TPro  6.8  /  Alb  3.8  /  TBili  0.5  /  DBili  x   /  AST  32  /  ALT  17  /  AlkPhos  96  03-09    LIVER FUNCTIONS - ( 09 Mar 2024 02:41 )  Alb: 3.8 g/dL / Pro: 6.8 g/dL / ALK PHOS: 96 U/L / ALT: 17 U/L / AST: 32 U/L / GGT: x           PT/INR - ( 09 Mar 2024 02:41 )   PT: 9.8 sec;   INR: 0.93 ratio         PTT - ( 09 Mar 2024 02:41 )  PTT:30.8 sec  Urinalysis Basic - ( 09 Mar 2024 02:41 )    Color: x / Appearance: x / SG: x / pH: x  Gluc: 108 mg/dL / Ketone: x  / Bili: x / Urobili: x   Blood: x / Protein: x / Nitrite: x   Leuk Esterase: x / RBC: x / WBC x   Sq Epi: x / Non Sq Epi: x / Bacteria: x      CAPILLARY BLOOD GLUCOSE      POCT Blood Glucose.: 111 mg/dL (09 Mar 2024 02:30)  POCT Blood Glucose.: 166 mg/dL (08 Mar 2024 20:52)  POCT Blood Glucose.: 59 mg/dL (08 Mar 2024 20:12)      RADIOLOGY & ADDITIONAL TESTS:

## 2024-03-09 NOTE — PHYSICAL THERAPY INITIAL EVALUATION ADULT - MANUAL MUSCLE TESTING RESULTS, REHAB EVAL
BLE 3/5 grossly in supine, L hand 3-/5, elbow 3-/5, shoulder 2/5, R hand NT/ elbow 2-/5, shld NT BLE 3/5 grossly in supine, L hand 3-/5, elbow 3-/5, shoulder 2/5, R hand NT/ elbow 2-/5, shld NT 3/10: LUE shoulder at least 1/5, elbow 2-/5, wrist 2-/5

## 2024-03-09 NOTE — PROVIDER CONTACT NOTE (HYPOGLYCEMIA EVENT) - NS PROVIDER CONTACT RECOMMEND-HYPO
ESTEFANIA Meadows aware of response of 166 mg/dL to Dextrose 50% 25 Grams IV Push. ESTEFANIA Meadows ok with response. Blood glucose monitoring q6h added to orders. D5% and LR ordered at 75 mL/hr.

## 2024-03-09 NOTE — PROGRESS NOTE ADULT - ASSESSMENT
A/P: 63F w central cord syndrome    Pain control PRN  C-collar  q1 neuro checks  SCDs  Plan for OR today for C3-7 decompression and fusion  NPO  Appreciate medical and cardiac clearance  Appreciate SICU care    Moni Oneal MD  Orthopaedic Surgery Resident    For all questions, please reach out via the following numbers for the on-call resident; do not reach out via Teams.  [facility] [phone]  [facility]        [phone]  [facility]  [phone]/ [phone]

## 2024-03-09 NOTE — PROGRESS NOTE ADULT - CRITICAL CARE ATTENDING COMMENT
64 yo, s/p syncopal fall, injuries C5-C6 fracture, C3-C7 cord compression.  N Refers numbness and tingling from neck down, R<L strength. Multimodal pain management.   P RA sat >90%.  C Off pressors. Lactate 0.7. Monitor hemodynamics. TTE unremarkable.  R UOP 100cc. Cr 0.68.   H Hgb 12.7. Monitor CBC.  I Off abx.  E Continue Decadron 8 q6h.   [facility] OR today for decompression fusion with spine surgery.

## 2024-03-09 NOTE — PROGRESS NOTE ADULT - SUBJECTIVE AND OBJECTIVE BOX
24 HOUR EVENTS:  -Urinary retention (700cc), galicia placed, Ortho performed rectal exam good tone and no change     SUBJECTIVE/ROS:  [X] A ten-point review of systems was otherwise negative except as noted.  [ ] Due to altered mental status/intubation, subjective information were not able to be obtained from the patient. History was obtained, to the extent possible, from review of the chart and collateral sources of information.      NEURO  GCS: 15      Exam: awake, alert, oriented RUE motor 2/5 RLE 4/5 sensation intact, LUE and LLE 5/5, C-collar in place  Meds: acetaminophen   IVPB .. 1000 milliGRAM(s) IV Intermittent every 6 hours  HYDROmorphone  Injectable 0.5 milliGRAM(s) IV Push every 3 hours PRN breakthrough pain  oxyCODONE    IR 2.5 milliGRAM(s) Oral every 4 hours PRN Moderate Pain (4 - 6)  oxyCODONE    IR 5 milliGRAM(s) Oral every 4 hours PRN Severe Pain (7 - 10)    [x] Adequacy of sedation and pain control has been assessed and adjusted      RESPIRATORY  RR: 26 (03-08-24 @ 23:00) (13 - 30)  SpO2: 96% (03-08-24 @ 23:00) (93% - 100%)  Exam: unlabored, clear to auscultation bilaterally  Mechanical Ventilation:     [N/A] Extubation Readiness Assessed  Meds:       CARDIOVASCULAR  HR: 86 (03-08-24 @ 23:00) (85 - 113)  BP: 122/75 (03-08-24 @ 23:00) (102/56 - 152/90)  BP(mean): 95 (03-08-24 @ 23:00) (75 - 99)  VBG - ( 08 Mar 2024 18:55 )  pH: 7.37  /  pCO2: 37    /  pO2: 74    / HCO3: 21    / Base Excess: -3.4  /  SaO2: 92.9   Lactate: 0.6                Exam: regular rate and rhythm  Cardiac Rhythm: sinus  Perfusion     [x]Adequate   [ ]Inadequate  Mentation   [x]Normal       [ ]Reduced  Extremities  [x]Warm         [ ]Cool  Volume Status [ ]Hypervolemic [x]Euvolemic [ ]Hypovolemic  Meds:       GI/NUTRITION  Exam: soft, nontender, nondistended  Diet: NPO for OR in am  Meds: pantoprazole  Injectable 40 milliGRAM(s) IV Push every 24 hours      GENITOURINARY  I&O's Detail    03-08 @ 07:01  -  03-09 @ 00:01  --------------------------------------------------------  IN:    dextrose 5% + lactated ringers: 225 mL    IV PiggyBack: 250 mL    Oral Fluid: 240 mL    sodium chloride 0.9%: 250 mL  Total IN: 965 mL    OUT:  Total OUT: 0 mL    Total NET: 965 mL        Weight (kg): 69.3 (03-08 @ 19:00), 69.4 (03-08 @ 08:10)  03-08    138  |  100  |  15  ----------------------------<  58<L>  3.8   |  21<L>  |  0.76    Ca    9.3      08 Mar 2024 19:05  Phos  3.1     03-08  Mg     1.9     03-08    TPro  7.1  /  Alb  4.1  /  TBili  0.7  /  DBili  x   /  AST  36  /  ALT  19  /  AlkPhos  100  03-08    [X] Galicia catheter, indication: acute urinary retention  Meds: dextrose 5% + lactated ringers. 1000 milliLiter(s) IV Continuous <Continuous>  magnesium sulfate  IVPB 2 Gram(s) IV Intermittent once        HEMATOLOGIC  Meds:   [x] VTE Prophylaxis                        13.3   7.32  )-----------( 199      ( 08 Mar 2024 19:05 )             39.4     PT/INR - ( 08 Mar 2024 19:05 )   PT: 10.3 sec;   INR: 0.98 ratio         PTT - ( 08 Mar 2024 19:05 )  PTT:30.0 sec  Transfusion     [ ] PRBC   [ ] Platelets   [ ] FFP   [ ] Cryoprecipitate      INFECTIOUS DISEASES  WBC Count: 7.32 K/uL (03-08 @ 19:05)  WBC Count: 9.50 K/uL (03-08 @ 12:23)  WBC Count: 7.04 K/uL (03-08 @ 08:42)      ENDOCRINE  CAPILLARY BLOOD GLUCOSE      POCT Blood Glucose.: 166 mg/dL (08 Mar 2024 20:52)  POCT Blood Glucose.: 59 mg/dL (08 Mar 2024 20:12)  POCT Blood Glucose.: 76 mg/dL (08 Mar 2024 08:11)    Meds: dexAMETHasone  IVPB 8 milliGRAM(s) IV Intermittent every 6 hours        ACCESS DEVICES:  [X] Peripheral IV  [X] Urinary Catheter, Date Placed: 3/8/2024 for acute urinary retention  [x] Necessity of urinary, arterial, and venous catheters discussed    OTHER MEDICATIONS:  chlorhexidine 2% Cloths 1 Application(s) Topical <User Schedule>      CODE STATUS: FULL

## 2024-03-09 NOTE — PHYSICAL THERAPY INITIAL EVALUATION ADULT - BALANCE DISTURBANCE, IDENTIFIED IMPAIRMENT CONTRIBUTE, REHAB EVAL
impaired coordination/impaired motor control/pain/impaired postural control/decreased sensation/impaired sensory feedback/decreased strength

## 2024-03-09 NOTE — PHYSICAL THERAPY INITIAL EVALUATION ADULT - ADDITIONAL COMMENTS
Pt reports living at home with spouse in a [facility] apartment, +2 steps to enter with HR, +14 steps with L side railing to living space; prior to fall/injury, pt is independent with ambulation, no DME, ind ADLs, +working as an  (desk work), +drives to work (20 min commute), ind ADLs, +wears glasses for distance and another pair for computer work, +RH, spouse is retired (was planned to be in [place] this week with his mother), has 2 kids ([place] and [place])

## 2024-03-09 NOTE — PROVIDER CONTACT NOTE (HYPOGLYCEMIA EVENT) - NS PROVIDER CONTACT BACKGROUND-HYPO
Age: 63y    Gender: Female    POCT Blood Glucose:  59 mg/dL (03-08-24 @ 20:12)  76 mg/dL (03-08-24 @ 08:11)      eMAR:  dexAMETHasone  IVPB   101.6 mL/Hr IV Intermittent (03-08-24 @ 19:44)    
Age: 63y    Gender: Female    POCT Blood Glucose:  166 mg/dL (03-08-24 @ 20:52)  59 mg/dL (03-08-24 @ 20:12)  76 mg/dL (03-08-24 @ 08:11)      eMAR:  dexAMETHasone  IVPB   101.6 mL/Hr IV Intermittent (03-08-24 @ 23:06)   101.6 mL/Hr IV Intermittent (03-08-24 @ 19:44)    dextrose 50% Injectable   50 milliLiter(s) IV Push (03-08-24 @ 20:26)

## 2024-03-09 NOTE — PROGRESS NOTE ADULT - SUBJECTIVE AND OBJECTIVE BOX
Orthopedic Surgery Progress Note     S: Patient seen and examined today. Overnight had episode of urinary retention requiring galicia, upon examination found to have good rectal tone and moving lower extremities without issue. Pain is well controlled. Denies f/c, chest pain, shortness of breath, dizziness.    MEDICATIONS  (STANDING):  acetaminophen   IVPB .. 1000 milliGRAM(s) IV Intermittent every 6 hours  chlorhexidine 2% Cloths 1 Application(s) Topical <User Schedule>  dexAMETHasone  IVPB 8 milliGRAM(s) IV Intermittent every 6 hours  dextrose 5% + lactated ringers. 1000 milliLiter(s) (75 mL/Hr) IV Continuous <Continuous>  pantoprazole  Injectable 40 milliGRAM(s) IV Push every 24 hours    MEDICATIONS  (PRN):  HYDROmorphone  Injectable 0.5 milliGRAM(s) IV Push every 3 hours PRN breakthrough pain  oxyCODONE    IR 2.5 milliGRAM(s) Oral every 4 hours PRN Moderate Pain (4 - 6)  oxyCODONE    IR 5 milliGRAM(s) Oral every 4 hours PRN Severe Pain (7 - 10)      Physical Exam:  Gen: NAD  Spine:  C-collar  Motor:                   C5                C6              C7               C8           T1   R            2/5                2/5            2/5             2/5          2/5  L             3/5               3/5             3/5             3/5          3/5                L2             L3             L4               L5            S1  R         4/5           4/5          4/5             4/5           4/5  L          5/5          5/5           5/5             5/5           5/5    Sensory:            C5         C6         C7      C8       T1        (0=absent, 1=impaired, 2=normal, NT=not testable)  R         2            2           2        2         2  L          2            2           2        2         2               L2          L3         L4      L5       S1         (0=absent, 1=impaired, 2=normal, NT=not testable)  R         2            2            2        2        2  L          2            2           2        2         2    Vital Signs Last 24 Hrs  T(C): 36.9 (09 Mar 2024 05:06), Max: 37.7 (08 Mar 2024 18:29)  T(F): 98.4 (09 Mar 2024 05:06), Max: 99.9 (08 Mar 2024 18:29)  HR: 84 (09 Mar 2024 05:06) (84 - 113)  BP: 108/55 (09 Mar 2024 05:06) (102/56 - 152/90)  BP(mean): 77 (09 Mar 2024 04:00) (75 - 99)  RR: 24 (09 Mar 2024 05:06) (13 - 30)  SpO2: 95% (09 Mar 2024 05:06) (93% - 100%)    Parameters below as of 09 Mar 2024 03:00  Patient On (Oxygen Delivery Method): room air        03-08-24 @ 07:01  -  03-09-24 @ 06:30  --------------------------------------------------------  IN: 1840 mL / OUT: 1085 mL / NET: 755 mL            LABS:                        12.7   6.62  )-----------( 173      ( 09 Mar 2024 02:41 )             37.9     03-09    137  |  101  |  12  ----------------------------<  108<H>  4.5   |  22  |  0.68    Ca    9.0      09 Mar 2024 02:41  Phos  3.1     03-09  Mg     2.5     03-09    TPro  6.8  /  Alb  3.8  /  TBili  0.5  /  DBili  x   /  AST  32  /  ALT  17  /  AlkPhos  96  03-09

## 2024-03-09 NOTE — PROGRESS NOTE ADULT - ASSESSMENT
Ms. Dumont is a 63 year old woman with a PMHx of RYGB, hypertension, hyperlipidemia, depression, social EtOH use, who presented after syncope and fall, remained down on ground 9pm->7:30am before presentation. Reports bilateral shoulder pain, R thumb pain, upper neck pain. She has new onset bilateral upper extremity weakness, R>L with acute c5-6 cervical fractures, no other injuries.     Plan:  - OR today with ortho spine for C3-C7 decompression and fusion  - syncope workup per medicine  - no other acute injuries noted on workup or physical exam  - will perform tertiary exam    Acute Care Surgery   [id number]

## 2024-03-09 NOTE — PHYSICAL THERAPY INITIAL EVALUATION ADULT - IMPAIRMENTS CONTRIBUTING IMPAIRED BED MOBILITY, REHAB EVAL
impaired balance/pain/impaired postural control/decreased sensation/impaired sensory feedback/decreased strength

## 2024-03-09 NOTE — PHYSICAL THERAPY INITIAL EVALUATION ADULT - PERTINENT HX OF CURRENT PROBLEM, REHAB EVAL
Pt is a 63y Female admitted to [facility] on 3/8/24 PMH HTN, HLD, depression, gastric bypass who presents c/o cervical pain and RUE weakness. Per pt, she had a syncopal fall last night and does not recall the events; had LOC. When she woke up, had severe pain and noticed new onset pain and weakness in the RUE. She endorses severe pain in bilateral shoulders and arms. Denies other numbness/tingling/paresthesias/weakness. Denies bowel/bladder incontinence. Denies fevers/chills. No other complaints at this time. Hospital course: +traumatic injury with new onset neurologic deficits and allodynia concerning for central cord syndrome, Plan for OR 3/9 for C3-7 decompression. Per ortho, Overnight had episode of urinary retention requiring galicia, upon examination found to have good rectal tone and moving lower extremities without issue. Pain is well controlled. Denies f/c, chest pain, shortness of breath, dizziness. CT pending for post OR of left hand +snuff box tenderness, xrays negative. TTE grossly normal w/o valvular dysfunction (3/8), Initial trops negative. MR cervical spine: Anterior inferior corner fractures at C5 and C6 are better seen on the CT of the cervical spine seen from earlier in the same day. There is associated prominent prevertebral edema from C2 to C5. No definite evidence of ligamentous injury. No traumatic subluxation. Advanced multilevel degenerative changes contribute to high-grade multilevel central canal stenosis as detailed above. Notably there is severe central canal stenosis and cord compression at C3-C4, C4-C5 and C5-C6. No cervical cord signal abnormality. MR thoracic spine: No fracture or acute traumatic malalignment. MR lumbar spine: Chronic mild compression deformity at L1. Degenerative changes as described. xray hand: No acute fracture or dislocation. STT osteoarthritis. No other advanced arthritic changes. xray shoulder/humerus: (-). CTH: No acute intracranial  hemorrhage or calvarial fracture. C5 and C6 vertebral body ACUTE anterior inferior endplate vertebral body displaced corner fractures. No subluxation. Multilevel cervical spondylosis and degenerative disease and spinal stenoses. Severe C3-4, moderate to severe C4-5 and C5-6 and moderate C6-7 central spinal stenoses. A follow-up MRI of cervical spine may prove helpful for further evaluation. CT chest: No acute intrathoracic pathology. 1.9 x 2.0 cm left interpolar renal isodense lesion, renal ultrasound needed for further characterization. CTA brain: No flow-limiting stenosis or vascular aneurysm. No AVM. Venous system is well opacified, no evidence for venous sinus or cortical vein thrombosis. CTA neck: No flow-limiting stenosis, evidence for arterial dissection, or vascular aneurysm. Pt is a 63y Female admitted to [facility] on 3/8/24 PMH HTN, HLD, depression, gastric bypass who presents c/o cervical pain and RUE weakness. Per pt, she had a syncopal fall last night and does not recall the events; had LOC. When she woke up, had severe pain and noticed new onset pain and weakness in the RUE. She endorses severe pain in bilateral shoulders and arms. Denies other numbness/tingling/paresthesias/weakness. Denies bowel/bladder incontinence. Denies fevers/chills. No other complaints at this time. Hospital course: +traumatic injury with new onset neurologic deficits and allodynia concerning for central cord syndrome, Plan for OR 3/9 for C3-7 decompression. Per ortho, Overnight had episode of urinary retention requiring galicia, upon examination found to have good rectal tone and moving lower extremities without issue. Pain is well controlled. Denies f/c, chest pain, shortness of breath, dizziness. CT pending for post OR of left hand +snuff box tenderness, xrays negative. TTE grossly normal w/o valvular dysfunction (3/8), Initial trops negative. MR cervical spine: Anterior inferior corner fractures at C5 and C6 are better seen on the CT of the cervical spine seen from earlier in the same day. There is associated prominent prevertebral edema from C2 to C5. No definite evidence of ligamentous injury. No traumatic subluxation. Advanced multilevel degenerative changes contribute to high-grade multilevel central canal stenosis as detailed above. Notably there is severe central canal stenosis and cord compression at C3-C4, C4-C5 and C5-C6. No cervical cord signal abnormality. MR thoracic spine: No fracture or acute traumatic malalignment. MR lumbar spine: Chronic mild compression deformity at L1. Degenerative changes as described. xray hand: No acute fracture or dislocation. STT osteoarthritis. No other advanced arthritic changes. xray shoulder/humerus: (-). CTH: No acute intracranial  hemorrhage or calvarial fracture. C5 and C6 vertebral body ACUTE anterior inferior endplate vertebral body displaced corner fractures. No subluxation. Multilevel cervical spondylosis and degenerative disease and spinal stenoses. Severe C3-4, moderate to severe C4-5 and C5-6 and moderate C6-7 central spinal stenoses. A follow-up MRI of cervical spine may prove helpful for further evaluation. CT chest: No acute intrathoracic pathology. 1.9 x 2.0 cm left interpolar renal isodense lesion, renal ultrasound needed for further characterization. CTA brain: No flow-limiting stenosis or vascular aneurysm. No AVM. Venous system is well opacified, no evidence for venous sinus or cortical vein thrombosis. CTA neck: No flow-limiting stenosis, evidence for arterial dissection, or vascular aneurysm. -MAP > 85 for 48 hours post op Pt is a 63y Female admitted to [facility] on 3/8/24 PMH HTN, HLD, depression, gastric bypass who presents c/o cervical pain and RUE weakness. Per pt, she had a syncopal fall last night and does not recall the events; had LOC. When she woke up, had severe pain and noticed new onset pain and weakness in the RUE. She endorses severe pain in bilateral shoulders and arms. Denies other numbness/tingling/paresthesias/weakness. Denies bowel/bladder incontinence. Denies fevers/chills. No other complaints at this time. Hospital course: +traumatic injury with new onset neurologic deficits and allodynia concerning for central cord syndrome, Plan for OR 3/9 for C3-7 decompression. Per ortho, Overnight had episode of urinary retention requiring galicia, upon examination found to have good rectal tone and moving lower extremities without issue. Pain is well controlled. Denies f/c, chest pain, shortness of breath, dizziness. CT pending for post OR of left hand +snuff box tenderness, xrays negative. TTE grossly normal w/o valvular dysfunction (3/8), Initial trops negative. MR cervical spine: Anterior inferior corner fractures at C5 and C6 are better seen on the CT of the cervical spine seen from earlier in the same day. There is associated prominent prevertebral edema from C2 to C5. No definite evidence of ligamentous injury. No traumatic subluxation. Advanced multilevel degenerative changes contribute to high-grade multilevel central canal stenosis as detailed above. Notably there is severe central canal stenosis and cord compression at C3-C4, C4-C5 and C5-C6. No cervical cord signal abnormality. MR thoracic spine: No fracture or acute traumatic malalignment. MR lumbar spine: Chronic mild compression deformity at L1. Degenerative changes as described. xray hand: No acute fracture or dislocation. STT osteoarthritis. No other advanced arthritic changes. xray shoulder/humerus: (-). CTH: No acute intracranial  hemorrhage or calvarial fracture. C5 and C6 vertebral body ACUTE anterior inferior endplate vertebral body displaced corner fractures. No subluxation. Multilevel cervical spondylosis and degenerative disease and spinal stenoses. Severe C3-4, moderate to severe C4-5 and C5-6 and moderate C6-7 central spinal stenoses. A follow-up MRI of cervical spine may prove helpful for further evaluation. CT chest: No acute intrathoracic pathology. 1.9 x 2.0 cm left interpolar renal isodense lesion, renal ultrasound needed for further characterization. CTA brain: No flow-limiting stenosis or vascular aneurysm. No AVM. Venous system is well opacified, no evidence for venous sinus or cortical vein thrombosis. CTA neck: No flow-limiting stenosis, evidence for arterial dissection, or vascular aneurysm. -MAP > 85 for 48 hours post op, s/p C3-7 Laminectomy/PSF on 3/9/24, CT wrist 3/10- No acute displaced fracture.

## 2024-03-09 NOTE — PRE PROCEDURE NOTE - PRE PROCEDURE EVALUATION
Dx: central cord syndrome    Sx: C3-7 posterior decompression and fusion    Surgeons:  Dr. Layton    Med Cleared:     H&P     ALL:     Vital Signs Last 24 Hrs    Labs     Type and Screen:     UA:      EKG:     CXR: no Pacemaker      62yo F w/ central cord syndrome. To OR today  for C3-7 decompression and fusion w/ Dr. Dr. Layton.    - Pain Control  - DVT PPx:  - hold all chemical PPx at midnight  - NPO except for meds  - IVF while NPO  - Medical Clearance per Dr. Bowden  - Plan for OR today    Pt discussed w/  ____ who agrees with the plan.

## 2024-03-09 NOTE — PATIENT PROFILE ADULT - FALL HARM RISK - HARM RISK INTERVENTIONS
Communicate Risk of Fall with Harm to all staff/Monitor for mental status changes/Reinforce activity limits and safety measures with patient and family/Review medications for side effects contributing to fall risk/Tailored Fall Risk Interventions/Use of alarms - bed, chair and/or voice tab/Visual Cue: Yellow wristband and red socks/Bed in lowest position, wheels locked, appropriate side rails in place/Call bell, personal items and telephone in reach/Instruct patient to call for assistance before getting out of bed or chair/Non-slip footwear when patient is out of bed/[place] to call system/Physically safe environment - no spills, clutter or unnecessary equipment/Purposeful Proactive Rounding/Room/bathroom lighting operational, light cord in reach

## 2024-03-09 NOTE — PHYSICAL THERAPY INITIAL EVALUATION ADULT - NSPTDISCHREC_GEN_A_CORE
DC pending completion of functional evaluation; anticipating acute rehab. acute rehab, PATRICK Jennie aware/Acute Inpatient Rehab

## 2024-03-09 NOTE — PHYSICAL THERAPY INITIAL EVALUATION ADULT - GENERAL OBSERVATIONS, REHAB EVAL
Pt a/w cervical pain and RUE weakness, s/p syncopal fall; +new onset neurologic deficits and allodynia concerning for central cord syndrome, s/p C3-7 decompression and fusion with plastics closure for mm flap reconstruction on posterior spine on 3/9/24. MAP requirements >85 requiring incr pressor requirements; per KRISTY Lee pt cleared for bedlevel PT eval at this time. Pt received supine in bed, +ICU monitoring, +IVL, A&OX4, pleasant and cooperative, +pillows for BUE elevation, +galicia, +b/l venodynes, +HMV, +ccollar at all times.

## 2024-03-09 NOTE — PROGRESS NOTE ADULT - ASSESSMENT
Patient is a 63y Female PMH HTN, HLD, depression, gastric bypass who presents c/o cervical pain and RUE weakness. Per patient, she had a syncopal fall last night and does not recall the events; had LOC. When she woke up, had severe pain and noticed new onset pain and weakness in the RUE. She endorses severe pain in bilateral shoulders and arms. Denies other numbness/tingling/paresthesias/weakness. Denies bowel/bladder incontinence. Denies fevers/chills. No other complaints at this time.     Admitted for new onset neurologic deficits and allodynia concerning for central cord syndrome.       Neuro  -OR in AM 3/9 for c3-c7 decompression  -C5-C6 fracture, remains on c-collar  -q1hour neuro checks  -Pain: Tylenol, Oxy  -CT pending for post OR of left hand +snuff box tenderness, xrays negative      Resp  -Maintain spo2 >92%, on RA    Cards  -TTE grossly normal w/o valvular dysfunction (3/8)  -Initial trops negative  -Cards consulted for syncopal work up    GI  -NPO at midnight  -Protonix daily      -D5 LR @75 cc/hr    Heme  -holding DVT prophylaxis  -Mechanical DVT prophylaxis SCDs    ID  -No active issues    Endo-  -x1 amp of D50 for hypoglycemia to the 50s  -IV decadron 8mg q6hr for cord compression    Dispo: SICU   Patient is a 63y Female PMH HTN, HLD, depression, gastric bypass who presents c/o cervical pain and RUE weakness. Per patient, she had a syncopal fall last night and does not recall the events; had LOC. When she woke up, had severe pain and noticed new onset pain and weakness in the RUE. She endorses severe pain in bilateral shoulders and arms. Denies other numbness/tingling/paresthesias/weakness. Denies bowel/bladder incontinence. Denies fevers/chills. No other complaints at this time.     Admitted for new onset neurologic deficits and allodynia concerning for central cord syndrome.       Neuro  -OR in AM 3/9 for c3-c7 decompression  -C5-C6 fracture, remains on c-collar  -q1hour neuro checks  -Pain: Tylenol, Oxy  -CT pending for post OR of left hand +snuff box tenderness, xrays negative      Resp  -Maintain spo2 >92%, on RA    Cards  -TTE grossly normal w/o valvular dysfunction (3/8)  -Initial trops negative  -Cards consulted for syncopal work up    GI  -NPO at midnight  -Protonix daily      -D5 LR @75 cc/hr    Heme  -holding DVT prophylaxis  -Mechanical DVT prophylaxis SCDs    ID  -No active issues    Endo-  -x1 amp of D50 for hypoglycemia to the 50s  -IV decadron 8mg q6hr for cord compression    Dispo: SICU    No contraindication to patient going to OR today from SICU perspective

## 2024-03-09 NOTE — BRIEF OPERATIVE NOTE - NSICDXBRIEFPROCEDURE_GEN_ALL_CORE_FT
PROCEDURES:  Laminectomy with instrumented fusion of cervical spine by posterior approach 09-Mar-2024 18:18:32 C3-C7 Decompression and Fusion Kimani Fagan

## 2024-03-10 LAB
ALBUMIN SERPL ELPH-MCNC: 3.7 G/DL — SIGNIFICANT CHANGE UP (ref 3.3–5)
ALP SERPL-CCNC: 73 U/L — SIGNIFICANT CHANGE UP (ref 40–120)
ALT FLD-CCNC: 13 U/L — SIGNIFICANT CHANGE UP (ref 10–45)
ANION GAP SERPL CALC-SCNC: 10 MMOL/L — SIGNIFICANT CHANGE UP (ref 5–17)
APTT BLD: 23.8 SEC — LOW (ref 24.5–35.6)
AST SERPL-CCNC: 31 U/L — SIGNIFICANT CHANGE UP (ref 10–40)
BASE EXCESS BLDV CALC-SCNC: 4.9 MMOL/L — HIGH (ref -2–3)
BILIRUB SERPL-MCNC: 0.3 MG/DL — SIGNIFICANT CHANGE UP (ref 0.2–1.2)
BUN SERPL-MCNC: 9 MG/DL — SIGNIFICANT CHANGE UP (ref 7–23)
CA-I SERPL-SCNC: 1.2 MMOL/L — SIGNIFICANT CHANGE UP (ref 1.15–1.33)
CALCIUM SERPL-MCNC: 9 MG/DL — SIGNIFICANT CHANGE UP (ref 8.4–10.5)
CHLORIDE BLDV-SCNC: 102 MMOL/L — SIGNIFICANT CHANGE UP (ref 96–108)
CHLORIDE SERPL-SCNC: 104 MMOL/L — SIGNIFICANT CHANGE UP (ref 96–108)
CO2 BLDV-SCNC: 30 MMOL/L — HIGH (ref 22–26)
CO2 SERPL-SCNC: 25 MMOL/L — SIGNIFICANT CHANGE UP (ref 22–31)
CREAT SERPL-MCNC: 0.78 MG/DL — SIGNIFICANT CHANGE UP (ref 0.5–1.3)
EGFR: 86 ML/MIN/1.73M2 — SIGNIFICANT CHANGE UP
GAS PNL BLDV: 136 MMOL/L — SIGNIFICANT CHANGE UP (ref 136–145)
GAS PNL BLDV: SIGNIFICANT CHANGE UP
GLUCOSE BLDV-MCNC: 130 MG/DL — HIGH (ref 70–99)
GLUCOSE SERPL-MCNC: 132 MG/DL — HIGH (ref 70–99)
HCO3 BLDV-SCNC: 29 MMOL/L — SIGNIFICANT CHANGE UP (ref 22–29)
HCT VFR BLD CALC: 36.9 % — SIGNIFICANT CHANGE UP (ref 34.5–45)
HCT VFR BLDA CALC: 37 % — SIGNIFICANT CHANGE UP (ref 34.5–46.5)
HGB BLD CALC-MCNC: 12.4 G/DL — SIGNIFICANT CHANGE UP (ref 11.7–16.1)
HGB BLD-MCNC: 12.4 G/DL — SIGNIFICANT CHANGE UP (ref 11.5–15.5)
HOROWITZ INDEX BLDV+IHG-RTO: 21 — SIGNIFICANT CHANGE UP
INR BLD: 0.92 RATIO — SIGNIFICANT CHANGE UP (ref 0.85–1.18)
LACTATE BLDV-MCNC: 1.1 MMOL/L — SIGNIFICANT CHANGE UP (ref 0.5–2)
MAGNESIUM SERPL-MCNC: 2.3 MG/DL — SIGNIFICANT CHANGE UP (ref 1.6–2.6)
MCHC RBC-ENTMCNC: 32 PG — SIGNIFICANT CHANGE UP (ref 27–34)
MCHC RBC-ENTMCNC: 33.6 GM/DL — SIGNIFICANT CHANGE UP (ref 32–36)
MCV RBC AUTO: 95.1 FL — SIGNIFICANT CHANGE UP (ref 80–100)
NRBC # BLD: 0 /100 WBCS — SIGNIFICANT CHANGE UP (ref 0–0)
PCO2 BLDV: 41 MMHG — SIGNIFICANT CHANGE UP (ref 39–42)
PH BLDV: 7.46 — HIGH (ref 7.32–7.43)
PHOSPHATE SERPL-MCNC: 3 MG/DL — SIGNIFICANT CHANGE UP (ref 2.5–4.5)
PLATELET # BLD AUTO: 187 K/UL — SIGNIFICANT CHANGE UP (ref 150–400)
PO2 BLDV: 48 MMHG — HIGH (ref 25–45)
POTASSIUM BLDV-SCNC: 4.7 MMOL/L — SIGNIFICANT CHANGE UP (ref 3.5–5.1)
POTASSIUM SERPL-MCNC: 4.7 MMOL/L — SIGNIFICANT CHANGE UP (ref 3.5–5.3)
POTASSIUM SERPL-SCNC: 4.7 MMOL/L — SIGNIFICANT CHANGE UP (ref 3.5–5.3)
PROT SERPL-MCNC: 6.2 G/DL — SIGNIFICANT CHANGE UP (ref 6–8.3)
PROTHROM AB SERPL-ACNC: 9.7 SEC — SIGNIFICANT CHANGE UP (ref 9.5–13)
RBC # BLD: 3.88 M/UL — SIGNIFICANT CHANGE UP (ref 3.8–5.2)
RBC # FLD: 13.3 % — SIGNIFICANT CHANGE UP (ref 10.3–14.5)
SAO2 % BLDV: 79.1 % — SIGNIFICANT CHANGE UP (ref 67–88)
SODIUM SERPL-SCNC: 139 MMOL/L — SIGNIFICANT CHANGE UP (ref 135–145)
WBC # BLD: 12.52 K/UL — HIGH (ref 3.8–10.5)
WBC # FLD AUTO: 12.52 K/UL — HIGH (ref 3.8–10.5)

## 2024-03-10 PROCEDURE — 73200 CT UPPER EXTREMITY W/O DYE: CPT | Mod: 26,RT

## 2024-03-10 PROCEDURE — 99291 CRITICAL CARE FIRST HOUR: CPT

## 2024-03-10 PROCEDURE — 36556 INSERT NON-TUNNEL CV CATH: CPT

## 2024-03-10 PROCEDURE — 76937 US GUIDE VASCULAR ACCESS: CPT | Mod: 26,59

## 2024-03-10 PROCEDURE — 76377 3D RENDER W/INTRP POSTPROCES: CPT | Mod: 26

## 2024-03-10 RX ORDER — PANTOPRAZOLE SODIUM 20 MG/1
40 TABLET, DELAYED RELEASE ORAL
Refills: 0 | Status: DISCONTINUED | OUTPATIENT
Start: 2024-03-10 | End: 2024-03-16

## 2024-03-10 RX ORDER — HYDROMORPHONE HYDROCHLORIDE 2 MG/ML
0.25 INJECTION INTRAMUSCULAR; INTRAVENOUS; SUBCUTANEOUS ONCE
Refills: 0 | Status: DISCONTINUED | OUTPATIENT
Start: 2024-03-10 | End: 2024-03-10

## 2024-03-10 RX ORDER — SODIUM CHLORIDE 9 MG/ML
1000 INJECTION, SOLUTION INTRAVENOUS ONCE
Refills: 0 | Status: COMPLETED | OUTPATIENT
Start: 2024-03-10 | End: 2024-03-10

## 2024-03-10 RX ORDER — OXYCODONE HYDROCHLORIDE 5 MG/1
5 TABLET ORAL EVERY 4 HOURS
Refills: 0 | Status: DISCONTINUED | OUTPATIENT
Start: 2024-03-10 | End: 2024-03-16

## 2024-03-10 RX ORDER — PHENYLEPHRINE HYDROCHLORIDE 10 MG/ML
0.2 INJECTION INTRAVENOUS
Qty: 40 | Refills: 0 | Status: DISCONTINUED | OUTPATIENT
Start: 2024-03-10 | End: 2024-03-11

## 2024-03-10 RX ORDER — ATORVASTATIN CALCIUM 80 MG/1
10 TABLET, FILM COATED ORAL AT BEDTIME
Refills: 0 | Status: DISCONTINUED | OUTPATIENT
Start: 2024-03-10 | End: 2024-03-16

## 2024-03-10 RX ORDER — NALOXEGOL OXALATE 12.5 MG/1
12.5 TABLET, FILM COATED ORAL DAILY
Refills: 0 | Status: DISCONTINUED | OUTPATIENT
Start: 2024-03-10 | End: 2024-03-16

## 2024-03-10 RX ORDER — SENNA PLUS 8.6 MG/1
2 TABLET ORAL AT BEDTIME
Refills: 0 | Status: DISCONTINUED | OUTPATIENT
Start: 2024-03-10 | End: 2024-03-16

## 2024-03-10 RX ORDER — SODIUM CHLORIDE 9 MG/ML
500 INJECTION INTRAMUSCULAR; INTRAVENOUS; SUBCUTANEOUS ONCE
Refills: 0 | Status: COMPLETED | OUTPATIENT
Start: 2024-03-10 | End: 2024-03-10

## 2024-03-10 RX ORDER — SODIUM CHLORIDE 9 MG/ML
1000 INJECTION INTRAMUSCULAR; INTRAVENOUS; SUBCUTANEOUS
Refills: 0 | Status: DISCONTINUED | OUTPATIENT
Start: 2024-03-10 | End: 2024-03-10

## 2024-03-10 RX ORDER — ACETAMINOPHEN 500 MG
650 TABLET ORAL EVERY 6 HOURS
Refills: 0 | Status: DISCONTINUED | OUTPATIENT
Start: 2024-03-10 | End: 2024-03-12

## 2024-03-10 RX ORDER — POLYETHYLENE GLYCOL 3350 17 G/17G
17 POWDER, FOR SOLUTION ORAL DAILY
Refills: 0 | Status: DISCONTINUED | OUTPATIENT
Start: 2024-03-10 | End: 2024-03-16

## 2024-03-10 RX ORDER — OXYCODONE HYDROCHLORIDE 5 MG/1
10 TABLET ORAL EVERY 4 HOURS
Refills: 0 | Status: DISCONTINUED | OUTPATIENT
Start: 2024-03-10 | End: 2024-03-16

## 2024-03-10 RX ADMIN — Medication 100 MILLIGRAM(S): at 15:43

## 2024-03-10 RX ADMIN — SODIUM CHLORIDE 2000 MILLILITER(S): 9 INJECTION, SOLUTION INTRAVENOUS at 01:18

## 2024-03-10 RX ADMIN — CHLORHEXIDINE GLUCONATE 1 APPLICATION(S): 213 SOLUTION TOPICAL at 05:23

## 2024-03-10 RX ADMIN — SODIUM CHLORIDE 1000 MILLILITER(S): 9 INJECTION INTRAMUSCULAR; INTRAVENOUS; SUBCUTANEOUS at 08:04

## 2024-03-10 RX ADMIN — Medication 400 MILLIGRAM(S): at 05:23

## 2024-03-10 RX ADMIN — Medication 650 MILLIGRAM(S): at 23:10

## 2024-03-10 RX ADMIN — OXYCODONE HYDROCHLORIDE 5 MILLIGRAM(S): 5 TABLET ORAL at 19:30

## 2024-03-10 RX ADMIN — Medication 101.6 MILLIGRAM(S): at 00:06

## 2024-03-10 RX ADMIN — SENNA PLUS 2 TABLET(S): 8.6 TABLET ORAL at 21:21

## 2024-03-10 RX ADMIN — Medication 650 MILLIGRAM(S): at 22:40

## 2024-03-10 RX ADMIN — Medication 1000 MILLIGRAM(S): at 05:53

## 2024-03-10 RX ADMIN — Medication 400 MILLIGRAM(S): at 11:45

## 2024-03-10 RX ADMIN — HYDROMORPHONE HYDROCHLORIDE 30 MILLILITER(S): 2 INJECTION INTRAMUSCULAR; INTRAVENOUS; SUBCUTANEOUS at 07:31

## 2024-03-10 RX ADMIN — SODIUM CHLORIDE 20 MILLILITER(S): 9 INJECTION INTRAMUSCULAR; INTRAVENOUS; SUBCUTANEOUS at 07:24

## 2024-03-10 RX ADMIN — POLYETHYLENE GLYCOL 3350 17 GRAM(S): 17 POWDER, FOR SOLUTION ORAL at 09:20

## 2024-03-10 RX ADMIN — HYDROMORPHONE HYDROCHLORIDE 0.25 MILLIGRAM(S): 2 INJECTION INTRAMUSCULAR; INTRAVENOUS; SUBCUTANEOUS at 17:05

## 2024-03-10 RX ADMIN — Medication 101.6 MILLIGRAM(S): at 16:56

## 2024-03-10 RX ADMIN — NALOXEGOL OXALATE 12.5 MILLIGRAM(S): 12.5 TABLET, FILM COATED ORAL at 11:48

## 2024-03-10 RX ADMIN — Medication 1000 MILLIGRAM(S): at 12:00

## 2024-03-10 RX ADMIN — HYDROMORPHONE HYDROCHLORIDE 30 MILLILITER(S): 2 INJECTION INTRAMUSCULAR; INTRAVENOUS; SUBCUTANEOUS at 07:23

## 2024-03-10 RX ADMIN — HYDROMORPHONE HYDROCHLORIDE 0.25 MILLIGRAM(S): 2 INJECTION INTRAMUSCULAR; INTRAVENOUS; SUBCUTANEOUS at 13:00

## 2024-03-10 RX ADMIN — Medication 101.6 MILLIGRAM(S): at 08:52

## 2024-03-10 RX ADMIN — ATORVASTATIN CALCIUM 10 MILLIGRAM(S): 80 TABLET, FILM COATED ORAL at 21:21

## 2024-03-10 RX ADMIN — OXYCODONE HYDROCHLORIDE 5 MILLIGRAM(S): 5 TABLET ORAL at 18:59

## 2024-03-10 RX ADMIN — OXYCODONE HYDROCHLORIDE 10 MILLIGRAM(S): 5 TABLET ORAL at 16:10

## 2024-03-10 RX ADMIN — HYDROMORPHONE HYDROCHLORIDE 0.25 MILLIGRAM(S): 2 INJECTION INTRAMUSCULAR; INTRAVENOUS; SUBCUTANEOUS at 16:50

## 2024-03-10 RX ADMIN — OXYCODONE HYDROCHLORIDE 10 MILLIGRAM(S): 5 TABLET ORAL at 10:25

## 2024-03-10 RX ADMIN — OXYCODONE HYDROCHLORIDE 10 MILLIGRAM(S): 5 TABLET ORAL at 10:55

## 2024-03-10 RX ADMIN — PHENYLEPHRINE HYDROCHLORIDE 5.2 MICROGRAM(S)/KG/MIN: 10 INJECTION INTRAVENOUS at 06:19

## 2024-03-10 RX ADMIN — HYDROMORPHONE HYDROCHLORIDE 0.25 MILLIGRAM(S): 2 INJECTION INTRAMUSCULAR; INTRAVENOUS; SUBCUTANEOUS at 13:15

## 2024-03-10 RX ADMIN — PHENYLEPHRINE HYDROCHLORIDE 5.2 MICROGRAM(S)/KG/MIN: 10 INJECTION INTRAVENOUS at 08:00

## 2024-03-10 RX ADMIN — OXYCODONE HYDROCHLORIDE 10 MILLIGRAM(S): 5 TABLET ORAL at 15:40

## 2024-03-10 RX ADMIN — SODIUM CHLORIDE 100 MILLILITER(S): 9 INJECTION INTRAMUSCULAR; INTRAVENOUS; SUBCUTANEOUS at 07:54

## 2024-03-10 RX ADMIN — SODIUM CHLORIDE 20 MILLILITER(S): 9 INJECTION INTRAMUSCULAR; INTRAVENOUS; SUBCUTANEOUS at 07:53

## 2024-03-10 RX ADMIN — Medication 1 TABLET(S): at 21:21

## 2024-03-10 RX ADMIN — Medication 100 MILLIGRAM(S): at 07:33

## 2024-03-10 NOTE — PROGRESS NOTE ADULT - SUBJECTIVE AND OBJECTIVE BOX
63-year-old female history of gastric bypass, hypertension, hyperlipidemia, depression, social EtOH use, transferred from [place] for C5-C6 vertebral body fracture.  Yesterday patient got up to go to the kitchen, felt lightheaded, had a syncopal episode and fell on the floor and laid on the floor all night because of right arm and right leg weakness.  Eventually was able to make a call and presented to the ED at [place].  Continues to complain of right upper lower extremity weakness, and bilateral shoulder pain.  Denies chest pain or shortness of breath, nausea vomiting, also complains of abdominal upper back pain. Patient was transferred to [facility] for further treatment. Patient is s/p a C3-7 decompression. She tolerated the procedure well. Patient states weakness has improved LE>UE      MEDICATIONS  (STANDING):  chlorhexidine 2% Cloths 1 Application(s) Topical <User Schedule>  dexAMETHasone  IVPB 8 milliGRAM(s) IV Intermittent every 8 hours  naloxegol 12.5 milliGRAM(s) Oral daily  phenylephrine    Infusion 0.2 MICROgram(s)/kG/Min (5.2 mL/Hr) IV Continuous <Continuous>  polyethylene glycol 3350 17 Gram(s) Oral daily  senna 2 Tablet(s) Oral at bedtime    MEDICATIONS  (PRN):  butorphanol Injectable 0.25 milliGRAM(s) IV Push every 6 hours PRN Pruritus  naloxone Injectable 0.1 milliGRAM(s) IV Push every 3 minutes PRN For ANY of the following changes in patient status:  A. RR LESS THAN 10 breaths per minute, B. Oxygen saturation LESS THAN 90%, C. Sedation score of 6  ondansetron Injectable 4 milliGRAM(s) IV Push every 6 hours PRN Nausea  oxyCODONE    IR 5 milliGRAM(s) Oral every 4 hours PRN Moderate Pain (4 - 6)  oxyCODONE    IR 10 milliGRAM(s) Oral every 4 hours PRN Severe Pain (7 - 10)          VITALS:   T(C): 36.6 (03-10-24 @ 15:00), Max: 37.2 (03-09-24 @ 23:00)  HR: 84 (03-10-24 @ 15:45) (75 - 109)  BP: 137/71 (03-10-24 @ 15:45) (110/57 - 159/91)  RR: 24 (03-10-24 @ 15:45) (12 - 35)  SpO2: 97% (03-10-24 @ 15:45) (92% - 100%)  Wt(kg): --    PHYSICAL EXAM:  GENERAL: NAD, well-groomed, well-developed  HEAD:  Atraumatic, Normocephalic  EYES: EOMI, PERRLA, conjunctiva and sclera clear  ENMT: No tonsillar erythema, exudates, or enlargement; Moist mucous membranes, Good dentition, No lesions  NECK: Supple, No JVD, Normal thyroid  NERVOUS SYSTEM:  Alert & Oriented X3, Good concentration ;right sided weakness   CHEST/LUNG: Clear to percussion bilaterally; No rales, rhonchi, wheezing, or rubs  HEART: Regular rate and rhythm; No murmurs, rubs, or gallops  ABDOMEN: Soft, Nontender, Nondistended; Bowel sounds present  EXTREMITIES:  2+ Peripheral Pulses, No clubbing, cyanosis, or edema  LYMPH: No lymphadenopathy noted  SKIN: No rashes or lesions    LABS:  ABG - ( 09 Mar 2024 20:27 )  pH, Arterial: 7.35  pH, Blood: x     /  pCO2: 44    /  pO2: 154   / HCO3: 24    / Base Excess: -1.4  /  SaO2: 96.6                  CBC Full  -  ( 09 Mar 2024 20:34 )  WBC Count : 13.35 K/uL  RBC Count : 3.73 M/uL  Hemoglobin : 11.7 g/dL  Hematocrit : 36.4 %  Platelet Count - Automated : 165 K/uL  Mean Cell Volume : 97.6 fl  Mean Cell Hemoglobin : 31.4 pg  Mean Cell Hemoglobin Concentration : 32.1 gm/dL  Auto Neutrophil # : x  Auto Lymphocyte # : x  Auto Monocyte # : x  Auto Eosinophil # : x  Auto Basophil # : x  Auto Neutrophil % : x  Auto Lymphocyte % : x  Auto Monocyte % : x  Auto Eosinophil % : x  Auto Basophil % : x    03-09    136  |  100  |  9   ----------------------------<  142<H>  4.2   |  23  |  0.61    Ca    8.4      09 Mar 2024 20:34  Phos  3.2     03-09  Mg     2.2     03-09    TPro  6.4  /  Alb  3.9  /  TBili  0.3  /  DBili  x   /  AST  25  /  ALT  14  /  AlkPhos  79  03-09    LIVER FUNCTIONS - ( 09 Mar 2024 20:34 )  Alb: 3.9 g/dL / Pro: 6.4 g/dL / ALK PHOS: 79 U/L / ALT: 14 U/L / AST: 25 U/L / GGT: x           PT/INR - ( 09 Mar 2024 20:34 )   PT: 10.0 sec;   INR: 0.91 ratio         PTT - ( 09 Mar 2024 20:34 )  PTT:28.5 sec  Urinalysis Basic - ( 09 Mar 2024 20:34 )    Color: x / Appearance: x / SG: x / pH: x  Gluc: 142 mg/dL / Ketone: x  / Bili: x / Urobili: x   Blood: x / Protein: x / Nitrite: x   Leuk Esterase: x / RBC: x / WBC x   Sq Epi: x / Non Sq Epi: x / Bacteria: x      CAPILLARY BLOOD GLUCOSE          RADIOLOGY & ADDITIONAL TESTS:

## 2024-03-10 NOTE — PROGRESS NOTE ADULT - CRITICAL CARE ATTENDING COMMENT
62 yo C3-7 laminectomy and fusion for fx and cord compression.  N Multimodal pain management, pain service following.  P RA sat >90.  C Goal MAP >85, tito gtt 0.2.   G Ruben PO.  R Cr 0.61, trend CMP, UOP.  DVT SCDs, chemical on hold.   I WBC 13, Ancef ppx. Trend WBC.  E Decadron taper. Monitor glycemia.  MSK pending CT of wrist

## 2024-03-10 NOTE — CHART NOTE - NSCHARTNOTEFT_GEN_A_CORE
Tertiary survey:      History of Present Illness:  Ms. Dumont is a 63 year old woman with a PMHx of RYGB, hypertension, hyperlipidemia, depression, social EtOH use, who presented after syncope and fall, remained down on ground 9pm->7:30am before presentation. She has new onset bilateral upper extremity weakness, R>L with acute c5-6 cervical fractures, no other injuries. Now s/p C3-C7 decompression and fusion.       Complete physical examination    General: A&Ox3, no acute distress  Neurologic: Cranial nerves I-XII intact, pins and needles worse in RUE, mild in b/l lower extremity, sensory intact   C-spine: Patient is in cervical collar   Chest/back: no tenderness to palpation over chest, tenderness over shoulder back   Respiratory: Equal bilateral chest rise   Cardiac: Normal rate   Abdomen: Soft, non tender, non distended   Musculoskeletal: RUE extremity weakness in  strength, sensation intact, LUE sensory and motor intact, ROM limited due to pain, b/l LE preserved strength and sensation.   Vascular: Warm, well perfused, b/l palpable radial and pedal pulses     Imaging:     PROCEDURE DATE:  03/08/2024          INTERPRETATION:  CT angiography of the brain and neck    CLINICAL INDICATION: Right upper and lower extremity weakness    TECHNIQUE: Direct axial CT scanning of the brain and neck was obtained   from the vertex to the level of the clavicular heads after the dynamic   intravenous injection of 70 cc of Omnipaque 300. 30 cc discarded.   Sagittal and coronal maximum intensity projection reformats were   provided.  Three-dimensional reconstructions were performed by the   radiologist using the [employer] workstation.    COMPARISON: CT brain and cervical spine 3/8/2024    FINDINGS:    CTA BRAIN:    Normal flow-related enhancement of the skull base/intracranial internal   carotid arteries.    Normal flow-related enhancement of the bilateral anterior, middle, and   posterior cerebral arteries.    Anterior communicating artery visualized.    Normal flow-related enhancement of the bilateral intradural vertebral   arteries and the basilar artery.    No flow-limiting stenosis or vascular aneurysm. No AVM.    Venous system is well opacified, no evidence forvenous sinus or cortical   vein thrombosis.    CTA NECK:    Normal flow-related enhancement of the bilateral common and internal   carotid arteries.    Normal flow-related enhancement of the bilateral vertebral arteries.    No flow-limiting stenosis,evidence for arterial dissection, or vascular   aneurysm.    IMPRESSION:    CTA brain:  No flow-limiting stenosis or vascular aneurysm. No AVM.    Venous system is well opacified, no evidence for venous sinus or cortical   vein thrombosis.    CTA neck:  No flow-limiting stenosis, evidence for arterial dissection, or vascular   aneurysm.    --- End of Report ---      ACC: [id number] EXAM:  MR SPINE CERVICAL   ORDERED BY: PEGGY CHILEL     PROCEDURE DATE:  03/08/2024          INTERPRETATION:  MR CERVICAL SPINE WITHOUT CONTRAST  MR THORACIC SPINE WITHOUT CONTRAST  MR LUMBAR SPINE WITHOUT CONTRAST    CLINICAL HISTORY: Cervical spine fracture. Fall.    TECHNIQUE: Multiphasic sagittal non-contrast magnetic resonance imaging   of the cervical, thoracic, and lumbar spine was performed. Axial   sequences were also obtained.    COMPARISON: CT cervical spine from earlier today.    FINDINGS:    Cervical Spine:  Anterior inferior corner fractures at C5 and C6 are better seen on the CT   of the cervical spine seen from earlier in the same day. There is likely   associated prominent prevertebral edema from C2 to C5. No definite   evidence of ligamentous injury.     Normal cervical lordosis is maintained. The vertebral body heights and   alignment are maintained. No focal STIR hyperintense marrow replacing   lesion. Heterogeneous marrow signal is nonspecific. Mild to moderate   multilevel disc space narrowing throughout the cervical spine.    At C2-C3 there is a mild diffusedisc osteophyte complex which indents   the ventral thecal sac and contributes to mild to moderate central canal   stenosis. No significant neural foraminal stenosis.    At C3-C4 there is a prominent diffuse disc osteophyte complex which   contributes to severe central canal stenosis and cord compression. No   underlying cord signal abnormality. Facet hypertrophy and uncovertebral   spurring contribute to mild bilateral neural foraminal stenosis.    At C4-C5 there is a disc bulge which contributes to severe central canal   stenosis and cord compression. No underlying cord signal abnormality.   Mild bilateral neural foraminal stenosis.    At C5-C6 there is a prominent disc bulge which contributes to severe   central canal stenosis and cord compression. No underlying cord signal   abnormality. Mild bilateral neural foraminal stenosis.    At C6-C7 there is a mild diffuse disc osteophyte complex which   contributes to moderate to severe central canal stenosis. No underlying   cord signal abnormality. No significant neural foraminal stenosis.      Thoracic Spine:  Normal thoracic kyphosis is maintained. Alignment is   maintained. Vertebral body height is maintained throughout the thoracic   spine. No focal STIR hyperintense marrow replacing lesion throughout the   thoracic spine. The marrow signal is heterogeneous which is nonspecific.   There is no significant disc bulge or herniation. There is no significant   thoracic spinal canal stenosis. There is no significant thoracic spinal   cord signal abnormality.    Lumbar Spine:  Normal lumbar lordosis is maintained. At L1 there is mild   loss of vertebral body height and wedge deformity without underlying STIR   hyperintense marrow edema consistent with a chronic mild compression   deformity. There is mild bony retropulsion which minimally indents the   ventral thecal sac without contributing to high-grade central canal   stenosis. Thoracic alignment are maintained. No focal STIR hyperintense   marrow replacing lesion. Marrow signal is heterogeneous which is   nonspecific. There is no significant disc bulge or herniation. There is   no significant lumbar spinal canal stenosis. Prominent facet hypertrophy   at L5-S1. A normal-appearing conus medullaris terminates at L2. The cauda   equina demonstrates a normal configuration.    The dominant cervical and retroperitoneal vessels demonstrate normal flow   voids. The visualized posterior fossa structures are unremarkable.    IMPRESSION:  MR cervical spine: Anterior inferior corner fractures at C5 and C6 are   better seen on the CT of the cervical spine seen from earlier in the same   day. There is associated prominent prevertebral edema from C2 to C5. No   definite evidence of ligamentous injury. No traumatic subluxation.    Advanced multilevel degenerative changes contribute to high-grade   multilevel central canal stenosis as detailed above. Notably there is   severe central canal stenosis and cord compression at C3-C4, C4-C5 and   C5-C6. No cervical cord signal abnormality.    MR thoracic spine: No fracture or acute traumatic malalignment.    MR lumbar spine: Chronic mild compression deformity at L1. Degenerative   changes as described.    --- End of Report ---    PROCEDURE DATE:  03/08/2024          INTERPRETATION:  CLINICAL INFORMATION: Status post fall with thoracic and   scapular pain.    COMPARISON: None.    CONTRAST/COMPLICATIONS:  IV Contrast: NONE  Oral Contrast: NONE  Complications: None reported at time of study completion    PROCEDURE:  CT of the Chest was performed.  Sagittal and coronal reformats were performed.    FINDINGS:    LUNGS AND AIRWAYS: Patent central airways.  Bilateral dependent   atelectasis.  PLEURA: No pleural effusion.  MEDIASTINUM AND MONTRELL: No lymphadenopathy.  VESSELS: Aortic and coronary artery calcifications..  HEART: Heart size is normal. No pericardial effusion.  CHEST WALL AND LOWER NECK: Within normal limits.  VISUALIZED UPPER ABDOMEN: Cholecystectomy and gastric surgery.  1.9 x 2.0 cm left interpolar isodense lesion.  BONES: Vertebral body compression deformity. No acute fractures or   dislocations.    IMPRESSION:  No acute intrathoracic pathology.    1.9 x 2.0 cm left interpolar renal isodense lesion, renal ultrasound   needed for further characterization.    ACC: [id number] EXAM:  XR WRIST COMP MIN 3 VIEWS RT   ORDERED BY: PEGGY CHILEL     PROCEDURE DATE:  03/08/2024          INTERPRETATION:  EXAMINATION: 4 views of the right wrist and 3 views of   the right hand    CLINICAL INFORMATION: Right wrist pain    IMPRESSION:    No acute fracture or dislocation. STT osteoarthritis. No other advanced   arthritic changes.    --- End of Report ---    ACC: [id number] EXAM:  XR SHOULDER COMP MIN 2V BI   ORDERED BY: UBALDO HITCHCOCK     ACC: [id number] EXAM:  XR HUMERUS MIN 2 VIEWS BI   ORDERED BY: UBALDO HITCHCOCK     PROCEDURE DATE:  03/08/2024          INTERPRETATION:  Bilateral shoulders, bilateral humeri, and chest.   Patient had a syncopal episode and had a fall.    Right shoulder. 3 views. There is mild degeneration around the humeral   head. No fracture.    Right humerus. 2 views. No fracture.    Left humerus. 2 views. No fracture.    AP chest on March 8, 2024 at 9:32 AM.    Heart size normal.    Slight right thoracic curve.    Lungs are clear. No fracture.    IMPRESSION: No acute findings.    ---End of Report ---      ACC: [id number] EXAM:  XR HUMERUS MIN 2 VIEWS BI   ORDERED BY: UBALDO HITCHCOCK     PROCEDURE DATE:  03/08/2024          INTERPRETATION:  Bilateral shoulders, bilateral humeri, and chest.   Patient had a syncopal episode and had a fall.    Right shoulder. 3 views. There is mild degeneration around the humeral   head. No fracture.    Right humerus. 2 views. No fracture.    Left humerus. 2 views. No fracture.    AP chest on March 8, 2024 at 9:32 AM.    Heart size normal.    Slight right thoracic curve.    Lungs are clear. No fracture.    IMPRESSION: No acute findings.    ---End of Report ---      --------------------------------------------------------------------------------------------------------------------------  Injuries:  C5 and C6 vertebral body ACUTE anterior inferior endplate vertebral body   displaced corner fractures. No subluxation.    				  Pain management:  - Multimodal pain control     Plan   -No additional injuries noted on exam   - Rest of care per primary team       ACS/Trauma   [id number] Tertiary survey:      History of Present Illness:  Ms. Dumont is a 63 year old woman with a PMHx of RYGB, hypertension, hyperlipidemia, depression, social EtOH use, who presented after syncope and fall, remained down on ground 9pm->7:30am before presentation. She has new onset bilateral upper extremity weakness, R>L with acute c5-6 cervical fractures, no other injuries. Now s/p C3-C7 decompression and fusion.       Complete physical examination    General: A&Ox3, no acute distress  Neurologic: Cranial nerves I-XII intact, pins and needles worse in RUE, mild in b/l lower extremity, sensory intact   C-spine: Patient is in cervical collar   Chest/back: no tenderness to palpation over chest, tenderness over shoulder back   Respiratory: Equal bilateral chest rise   Cardiac: Normal rate   Abdomen: Soft, non tender, non distended   Musculoskeletal: RUE extremity weakness in  strength, sensation intact, LUE sensory and motor intact, ROM limited due to pain, b/l LE preserved strength and sensation.   Vascular: Warm, well perfused, b/l palpable radial and pedal pulses     Imaging:     PROCEDURE DATE:  03/08/2024          INTERPRETATION:  CT angiography of the brain and neck    CLINICAL INDICATION: Right upper and lower extremity weakness    TECHNIQUE: Direct axial CT scanning of the brain and neck was obtained   from the vertex to the level of the clavicular heads after the dynamic   intravenous injection of 70 cc of Omnipaque 300. 30 cc discarded.   Sagittal and coronal maximum intensity projection reformats were   provided.  Three-dimensional reconstructions were performed by the   radiologist using the [employer] workstation.    COMPARISON: CT brain and cervical spine 3/8/2024    FINDINGS:    CTA BRAIN:    Normal flow-related enhancement of the skull base/intracranial internal   carotid arteries.    Normal flow-related enhancement of the bilateral anterior, middle, and   posterior cerebral arteries.    Anterior communicating artery visualized.    Normal flow-related enhancement of the bilateral intradural vertebral   arteries and the basilar artery.    No flow-limiting stenosis or vascular aneurysm. No AVM.    Venous system is well opacified, no evidence forvenous sinus or cortical   vein thrombosis.    CTA NECK:    Normal flow-related enhancement of the bilateral common and internal   carotid arteries.    Normal flow-related enhancement of the bilateral vertebral arteries.    No flow-limiting stenosis,evidence for arterial dissection, or vascular   aneurysm.    IMPRESSION:    CTA brain:  No flow-limiting stenosis or vascular aneurysm. No AVM.    Venous system is well opacified, no evidence for venous sinus or cortical   vein thrombosis.    CTA neck:  No flow-limiting stenosis, evidence for arterial dissection, or vascular   aneurysm.    --- End of Report ---      ACC: [id number] EXAM:  MR SPINE CERVICAL   ORDERED BY: PEGGY CHILEL     PROCEDURE DATE:  03/08/2024          INTERPRETATION:  MR CERVICAL SPINE WITHOUT CONTRAST  MR THORACIC SPINE WITHOUT CONTRAST  MR LUMBAR SPINE WITHOUT CONTRAST    CLINICAL HISTORY: Cervical spine fracture. Fall.    TECHNIQUE: Multiphasic sagittal non-contrast magnetic resonance imaging   of the cervical, thoracic, and lumbar spine was performed. Axial   sequences were also obtained.    COMPARISON: CT cervical spine from earlier today.    FINDINGS:    Cervical Spine:  Anterior inferior corner fractures at C5 and C6 are better seen on the CT   of the cervical spine seen from earlier in the same day. There is likely   associated prominent prevertebral edema from C2 to C5. No definite   evidence of ligamentous injury.     Normal cervical lordosis is maintained. The vertebral body heights and   alignment are maintained. No focal STIR hyperintense marrow replacing   lesion. Heterogeneous marrow signal is nonspecific. Mild to moderate   multilevel disc space narrowing throughout the cervical spine.    At C2-C3 there is a mild diffusedisc osteophyte complex which indents   the ventral thecal sac and contributes to mild to moderate central canal   stenosis. No significant neural foraminal stenosis.    At C3-C4 there is a prominent diffuse disc osteophyte complex which   contributes to severe central canal stenosis and cord compression. No   underlying cord signal abnormality. Facet hypertrophy and uncovertebral   spurring contribute to mild bilateral neural foraminal stenosis.    At C4-C5 there is a disc bulge which contributes to severe central canal   stenosis and cord compression. No underlying cord signal abnormality.   Mild bilateral neural foraminal stenosis.    At C5-C6 there is a prominent disc bulge which contributes to severe   central canal stenosis and cord compression. No underlying cord signal   abnormality. Mild bilateral neural foraminal stenosis.    At C6-C7 there is a mild diffuse disc osteophyte complex which   contributes to moderate to severe central canal stenosis. No underlying   cord signal abnormality. No significant neural foraminal stenosis.      Thoracic Spine:  Normal thoracic kyphosis is maintained. Alignment is   maintained. Vertebral body height is maintained throughout the thoracic   spine. No focal STIR hyperintense marrow replacing lesion throughout the   thoracic spine. The marrow signal is heterogeneous which is nonspecific.   There is no significant disc bulge or herniation. There is no significant   thoracic spinal canal stenosis. There is no significant thoracic spinal   cord signal abnormality.    Lumbar Spine:  Normal lumbar lordosis is maintained. At L1 there is mild   loss of vertebral body height and wedge deformity without underlying STIR   hyperintense marrow edema consistent with a chronic mild compression   deformity. There is mild bony retropulsion which minimally indents the   ventral thecal sac without contributing to high-grade central canal   stenosis. Thoracic alignment are maintained. No focal STIR hyperintense   marrow replacing lesion. Marrow signal is heterogeneous which is   nonspecific. There is no significant disc bulge or herniation. There is   no significant lumbar spinal canal stenosis. Prominent facet hypertrophy   at L5-S1. A normal-appearing conus medullaris terminates at L2. The cauda   equina demonstrates a normal configuration.    The dominant cervical and retroperitoneal vessels demonstrate normal flow   voids. The visualized posterior fossa structures are unremarkable.    IMPRESSION:  MR cervical spine: Anterior inferior corner fractures at C5 and C6 are   better seen on the CT of the cervical spine seen from earlier in the same   day. There is associated prominent prevertebral edema from C2 to C5. No   definite evidence of ligamentous injury. No traumatic subluxation.    Advanced multilevel degenerative changes contribute to high-grade   multilevel central canal stenosis as detailed above. Notably there is   severe central canal stenosis and cord compression at C3-C4, C4-C5 and   C5-C6. No cervical cord signal abnormality.    MR thoracic spine: No fracture or acute traumatic malalignment.    MR lumbar spine: Chronic mild compression deformity at L1. Degenerative   changes as described.    --- End of Report ---    PROCEDURE DATE:  03/08/2024          INTERPRETATION:  CLINICAL INFORMATION: Status post fall with thoracic and   scapular pain.    COMPARISON: None.    CONTRAST/COMPLICATIONS:  IV Contrast: NONE  Oral Contrast: NONE  Complications: None reported at time of study completion    PROCEDURE:  CT of the Chest was performed.  Sagittal and coronal reformats were performed.    FINDINGS:    LUNGS AND AIRWAYS: Patent central airways.  Bilateral dependent   atelectasis.  PLEURA: No pleural effusion.  MEDIASTINUM AND MONTRELL: No lymphadenopathy.  VESSELS: Aortic and coronary artery calcifications..  HEART: Heart size is normal. No pericardial effusion.  CHEST WALL AND LOWER NECK: Within normal limits.  VISUALIZED UPPER ABDOMEN: Cholecystectomy and gastric surgery.  1.9 x 2.0 cm left interpolar isodense lesion.  BONES: Vertebral body compression deformity. No acute fractures or   dislocations.    IMPRESSION:  No acute intrathoracic pathology.    1.9 x 2.0 cm left interpolar renal isodense lesion, renal ultrasound   needed for further characterization.    ACC: [id number] EXAM:  XR WRIST COMP MIN 3 VIEWS RT   ORDERED BY: PEGGY CHILEL     PROCEDURE DATE:  03/08/2024          INTERPRETATION:  EXAMINATION: 4 views of the right wrist and 3 views of   the right hand    CLINICAL INFORMATION: Right wrist pain    IMPRESSION:    No acute fracture or dislocation. STT osteoarthritis. No other advanced   arthritic changes.    --- End of Report ---    ACC: [id number] EXAM:  XR SHOULDER COMP MIN 2V BI   ORDERED BY: UBALDO HITCHCOCK     ACC: [id number] EXAM:  XR HUMERUS MIN 2 VIEWS BI   ORDERED BY: UBALDO HITCHCOCK     PROCEDURE DATE:  03/08/2024          INTERPRETATION:  Bilateral shoulders, bilateral humeri, and chest.   Patient had a syncopal episode and had a fall.    Right shoulder. 3 views. There is mild degeneration around the humeral   head. No fracture.    Right humerus. 2 views. No fracture.    Left humerus. 2 views. No fracture.    AP chest on March 8, 2024 at 9:32 AM.    Heart size normal.    Slight right thoracic curve.    Lungs are clear. No fracture.    IMPRESSION: No acute findings.    ---End of Report ---      ACC: [id number] EXAM:  XR HUMERUS MIN 2 VIEWS BI   ORDERED BY: UBALDO HITCHCOCK     PROCEDURE DATE:  03/08/2024          INTERPRETATION:  Bilateral shoulders, bilateral humeri, and chest.   Patient had a syncopal episode and had a fall.    Right shoulder. 3 views. There is mild degeneration around the humeral   head. No fracture.    Right humerus. 2 views. No fracture.    Left humerus. 2 views. No fracture.    AP chest on March 8, 2024 at 9:32 AM.    Heart size normal.    Slight right thoracic curve.    Lungs are clear. No fracture.    IMPRESSION: No acute findings.    ---End of Report ---      --------------------------------------------------------------------------------------------------------------------------  Injuries:  C5 and C6 vertebral body ACUTE anterior inferior endplate vertebral body   displaced corner fractures. No subluxation.    Incidental:   1.9 x 2.0 cm left interpolar renal isodense lesion, renal ultrasound   needed for further characterization.    				  Pain management:  - Multimodal pain control     Plan   -No additional injuries noted on exam   - Rest of care per primary team       ACS/Trauma   [id number]

## 2024-03-10 NOTE — PROGRESS NOTE ADULT - ASSESSMENT
Ms. Dumont is a 63 year old woman with a PMHx of RYGB, hypertension, hyperlipidemia, depression, social EtOH use, who presented after syncope and fall, remained down on ground 9pm->7:30am before presentation. Reports bilateral shoulder pain, R thumb pain, upper neck pain. She has new onset bilateral upper extremity weakness, R>L with acute c5-6 cervical fractures, no other injuries.     Plan:  - s/p C3-C7 decompression and fusion  - syncope workup per medicine  - no other acute injuries noted on workup or physical exam  - Tertiary exam performed   -     Acute Care Surgery   [id number]     Ms. Dumont is a 63 year old woman with a PMHx of RYGB, hypertension, hyperlipidemia, depression, social EtOH use, who presented after syncope and fall, remained down on ground 9pm->7:30am before presentation. Reports bilateral shoulder pain, R thumb pain, upper neck pain. She has new onset bilateral upper extremity weakness, R>L with acute c5-6 cervical fractures, no other injuries.     Plan:  - s/p C3-C7 decompression and fusion  - syncope workup per medicine  - no other acute injuries noted on workup or physical exam  - Tertiary exam performed   - CT finding of 1.9 x 2.0 cm left interpolar renal isodense lesion, renal ultrasound   needed for further characterization.  - Please reconsult surgery PRN      Acute Care Surgery   [id number]

## 2024-03-10 NOTE — PROGRESS NOTE ADULT - SUBJECTIVE AND OBJECTIVE BOX
SURGERY DAILY PROGRESS NOTE    SUBJECTIVE: Patient seen and evaluated.      Overnight Events:  No acute events overnight  -----------------------------------------------------------------------------------------------------------------------------------------------------------------------------------------------------------  OBJECTIVE:  Vital Signs Last 24 Hrs  T(C): 36.9 (10 Mar 2024 11:00), Max: 37.2 (09 Mar 2024 23:00)  T(F): 98.4 (10 Mar 2024 11:00), Max: 99 (09 Mar 2024 23:00)  HR: 88 (10 Mar 2024 12:45) (75 - 109)  BP: 127/69 (10 Mar 2024 12:45) (110/57 - 159/91)  BP(mean): 92 (10 Mar 2024 12:45) (79 - 118)  RR: 23 (10 Mar 2024 12:45) (12 - 35)  SpO2: 95% (10 Mar 2024 12:45) (92% - 100%)    Parameters below as of 10 Mar 2024 11:50  Patient On (Oxygen Delivery Method): room air      I&O's Detail    09 Mar 2024 06:01  -  10 Mar 2024 07:00  --------------------------------------------------------  IN:    dextrose 5% + lactated ringers: 550 mL    IV PiggyBack: 100 mL    IV PiggyBack: 550 mL    multiple electrolytes Injection Type 1 Bolus: 1000 mL    Oral Fluid: 265 mL    Phenylephrine: 15.6 mL    sodium chloride 0.9%: 200 mL  Total IN: 2680.6 mL    OUT:    Accordian (mL): 175 mL    Indwelling Catheter - Urethral (mL): 1970 mL  Total OUT: 2145 mL    Total NET: 535.6 mL      10 Mar 2024 07:01  -  10 Mar 2024 15:51  --------------------------------------------------------  IN:    IV PiggyBack: 200 mL    Oral Fluid: 450 mL    Phenylephrine: 17.8 mL    sodium chloride 0.9%: 400 mL    Sodium Chloride 0.9% Bolus: 500 mL  Total IN: 1567.8 mL    OUT:    Indwelling Catheter - Urethral (mL): 1315 mL  Total OUT: 1315 mL    Total NET: 252.8 mL        Daily     Daily   MEDICATIONS  (STANDING):  chlorhexidine 2% Cloths 1 Application(s) Topical <User Schedule>  dexAMETHasone  IVPB 8 milliGRAM(s) IV Intermittent every 8 hours  naloxegol 12.5 milliGRAM(s) Oral daily  phenylephrine    Infusion 0.2 MICROgram(s)/kG/Min (5.2 mL/Hr) IV Continuous <Continuous>  polyethylene glycol 3350 17 Gram(s) Oral daily  senna 2 Tablet(s) Oral at bedtime    MEDICATIONS  (PRN):  butorphanol Injectable 0.25 milliGRAM(s) IV Push every 6 hours PRN Pruritus  naloxone Injectable 0.1 milliGRAM(s) IV Push every 3 minutes PRN For ANY of the following changes in patient status:  A. RR LESS THAN 10 breaths per minute, B. Oxygen saturation LESS THAN 90%, C. Sedation score of 6  ondansetron Injectable 4 milliGRAM(s) IV Push every 6 hours PRN Nausea  oxyCODONE    IR 5 milliGRAM(s) Oral every 4 hours PRN Moderate Pain (4 - 6)  oxyCODONE    IR 10 milliGRAM(s) Oral every 4 hours PRN Severe Pain (7 - 10)      LABS:                        11.7   13.35 )-----------( 165      ( 09 Mar 2024 20:34 )             36.4     03-09    136  |  100  |  9   ----------------------------<  142<H>  4.2   |  23  |  0.61    Ca    8.4      09 Mar 2024 20:34  Phos  3.2     03-09  Mg     2.2     03-09    TPro  6.4  /  Alb  3.9  /  TBili  0.3  /  DBili  x   /  AST  25  /  ALT  14  /  AlkPhos  79  03-09    PT/INR - ( 09 Mar 2024 20:34 )   PT: 10.0 sec;   INR: 0.91 ratio         PTT - ( 09 Mar 2024 20:34 )  PTT:28.5 sec  Urinalysis Basic - ( 09 Mar 2024 20:34 )    Color: x / Appearance: x / SG: x / pH: x  Gluc: 142 mg/dL / Ketone: x  / Bili: x / Urobili: x   Blood: x / Protein: x / Nitrite: x   Leuk Esterase: x / RBC: x / WBC x   Sq Epi: x / Non Sq Epi: x / Bacteria: x        PHYSICAL EXAM:  General: A&Ox3, no acute distress  Neurologic: Cranial nerves I-XII intact, pins and needles worse in RUE, mild in b/l lower extremity, sensory intact   C-spine: Patient is in cervical collar   Chest/back: no tenderness to palpation over chest, tenderness over shoulder back   Respiratory: Equal bilateral chest rise   Cardiac: Normal rate   Abdomen: Soft, non tender, non distended   Musculoskeletal: RUE extremity weakness in  strength, sensation intact, LUE sensory and motor intact, ROM limited due to pain, b/l LE preserved strength and sensation.   Vascular: Warm, well perfused, b/l palpable radial and pedal pulses

## 2024-03-10 NOTE — PROGRESS NOTE ADULT - SUBJECTIVE AND OBJECTIVE BOX
Anesthesia Pain Management Service    SUBJECTIVE: Patient is doing well with IV PCA and no significant problems reported.    Pain Scale Score	At rest: ___ 	With Activity: ___ 	[X ] Refer to charted pain scores    THERAPY:    [ ] IV PCA Morphine		[ ] 5 mg/mL	[ ] 1 mg/mL  [X ] IV PCA Hydromorphone	[ ] 5 mg/mL	[X ] 1 mg/mL  [ ] IV PCA Fentanyl		[ ] 50 micrograms/mL    Demand dose __0.2_ lockout __6_ (minutes) Continuous Rate _0__ Total: ___   mg used (in past 24 hrs)      MEDICATIONS  (STANDING):  acetaminophen   IVPB .. 1000 milliGRAM(s) IV Intermittent every 6 hours  ceFAZolin   IVPB 2000 milliGRAM(s) IV Intermittent every 8 hours  chlorhexidine 2% Cloths 1 Application(s) Topical <User Schedule>  dexAMETHasone  IVPB 8 milliGRAM(s) IV Intermittent every 8 hours  naloxegol 12.5 milliGRAM(s) Oral daily  phenylephrine    Infusion 0.2 MICROgram(s)/kG/Min (5.2 mL/Hr) IV Continuous <Continuous>  polyethylene glycol 3350 17 Gram(s) Oral daily  senna 2 Tablet(s) Oral at bedtime  sodium chloride 0.9%. 1000 milliLiter(s) (100 mL/Hr) IV Continuous <Continuous>    MEDICATIONS  (PRN):  butorphanol Injectable 0.25 milliGRAM(s) IV Push every 6 hours PRN Pruritus  naloxone Injectable 0.1 milliGRAM(s) IV Push every 3 minutes PRN For ANY of the following changes in patient status:  A. RR LESS THAN 10 breaths per minute, B. Oxygen saturation LESS THAN 90%, C. Sedation score of 6  ondansetron Injectable 4 milliGRAM(s) IV Push every 6 hours PRN Nausea  oxyCODONE    IR 5 milliGRAM(s) Oral every 4 hours PRN Moderate Pain (4 - 6)  oxyCODONE    IR 10 milliGRAM(s) Oral every 4 hours PRN Severe Pain (7 - 10)      OBJECTIVE:    Sedation Score:	[ X] Alert	[ ] Drowsy 	[ ] Arousable	[ ] Asleep	[ ] Unresponsive    Side Effects:	[X ] None	[ ] Nausea	[ ] Vomiting	[ ] Pruritus  		[ ] Other:    Vital Signs Last 24 Hrs  T(C): 36.9 (10 Mar 2024 08:00), Max: 37.3 (09 Mar 2024 11:00)  T(F): 98.4 (10 Mar 2024 08:00), Max: 99.1 (09 Mar 2024 11:00)  HR: 87 (10 Mar 2024 10:00) (75 - 109)  BP: 123/58 (10 Mar 2024 10:00) (110/57 - 159/91)  BP(mean): 84 (10 Mar 2024 10:00) (79 - 118)  RR: 22 (10 Mar 2024 10:00) (12 - 35)  SpO2: 98% (10 Mar 2024 10:00) (92% - 100%)    Parameters below as of 10 Mar 2024 08:00  Patient On (Oxygen Delivery Method): room air        ASSESSMENT/ PLAN    Therapy to  be:	[ ] Continue   [ X] Discontinued   [X ] Change to prn Analgesics    Documentation and Verification of current medications:   [X] Done	[ ] Not done, not elligible    Comments: PRN Oral/IV opioids and/or Adjuvant non-opioid medication to be ordered at this point.    Progress Note written now but Patient was seen earlier.

## 2024-03-10 NOTE — PROGRESS NOTE ADULT - ASSESSMENT
64 yo woman s/p a syncopal episode presents with severe central canal stenosis and cord compression at C3-C4, C4-C5 and C5-C6 after a syncopal episide

## 2024-03-10 NOTE — PROGRESS NOTE ADULT - SUBJECTIVE AND OBJECTIVE BOX
Patient interviewed and examined at bedside in surgical ICU. In pain, but appropriate in post-operative context. Endorses continued numbness in the Right > Left upper and lower extremities, however, patient endorses that this has all improved compared to last night. Denies fevers, chills, headaches, chest pain, or shortness of breath.    Vital Signs (24 Hrs):  T(C): 36.9 (03-10-24 @ 08:00), Max: 37.3 (03-09-24 @ 11:00)  HR: 88 (03-10-24 @ 09:45) (75 - 109)  BP: 143/92 (03-10-24 @ 09:45) (110/57 - 159/91)  RR: 25 (03-10-24 @ 09:45) (12 - 35)  SpO2: 97% (03-10-24 @ 09:45) (92% - 100%)  Wt(kg): --    LABS:                          11.7   13.35 )-----------( 165      ( 09 Mar 2024 20:34 )             36.4     03-09    136  |  100  |  9   ----------------------------<  142<H>  4.2   |  23  |  0.61    Ca    8.4      09 Mar 2024 20:34  Phos  3.2     03-09  Mg     2.2     03-09    TPro  6.4  /  Alb  3.9  /  TBili  0.3  /  DBili  x   /  AST  25  /  ALT  14  /  AlkPhos  79  03-09    LIVER FUNCTIONS - ( 09 Mar 2024 20:34 )  Alb: 3.9 g/dL / Pro: 6.4 g/dL / ALK PHOS: 79 U/L / ALT: 14 U/L / AST: 25 U/L / GGT: x           PT/INR - ( 09 Mar 2024 20:34 )   PT: 10.0 sec;   INR: 0.91 ratio         PTT - ( 09 Mar 2024 20:34 )  PTT:28.5 sec      PHYSICAL EXAM  GEN: NAD, AAOx3    SPINE:  Cervical collar in place.   Grossly moving all extremities; Minimal put present volitional movement in the Right upper extremity.   SILT throughout all extremities; Endorses reduced sensation in the Right > Left upper and lower extremities.       MOTOR EXAM:                          Elbow Flex (C5)     Wrist Ext (C6)     Elbow Ext (C7)      Finger Flex (C8)    Finger Abduction (T1)  RIGHT                 2/5                         4/5                         2/5                          3/5                              2/5  LEFT                    3/5                         4/5                         3/5                          4/5                              4/5                           Hip Flex (L2)      Knee Ext (L3)      Ank Dorsiflex (L4)     Hallux Ext (L5)     Ank PlantarFlex (S1)  RIGHT               4/5                      5/5                          5/5                            5/5                           5/5  LEFT                  5/5                      5/5                          5/5                            5/5                           5/5      SENSORY EXAM:                        C5      C6      C7      C8       T1          RIGHT          2         1        1         1         2          (0=absent, 1=impaired, 2=normal, NT=not testable)  LEFT             2         2        2         2         2          (0=absent, 1=impaired, 2=normal, NT=not testable)                        L2        L3       L4      L5       S1          RIGHT         2         2        2         2         2           (0=absent, 1=impaired, 2=normal, NT=not testable)  LEFT            2         2        2         2         2            (0=absent, 1=impaired, 2=normal, NT=not testable)    Positive Lombardi's sign on Right (index finger).   Positive Babinski on Right.   Three beats clonus bilaterally.         ASSESSMENT:  63y Female s/p C3-7 Laminectomy/PSF on 3/9/24; Stable.     PLAN:  - MAP goals > 85 mm Hg x 48 hours.   - Q1 neuroio checks.   - Pain control: PCA; Do not give Toradol.   - DVT Ppx: Hold in the setting of recent spinal surgery due to increased risk of epidural hematoma; Will consider Lovenox on POD2/POD3.    - Karina-operative Ancef x 24 hours.   - HMV drain in place draining sero-sanguinous fluid; Monitor output.   - Steroids: 48 hours IV Decadron followed by PO taper.    - WBAT / OOB with assistance as needed.   - Hard collar at all times.   - Michael.   - PT/OT.   - Encourage incentive spirometry.   - Will discuss with Dr. Layton and will advise if plan changes.

## 2024-03-10 NOTE — PROVIDER CONTACT NOTE (OTHER) - SITUATION
galicia catheter noted removed from patient. no s/s of trauma noted. galicia catheter balloon noted intact/already deflated upon removal. pt voided 300ml in bedpan. post void bladder scan noted 282ml

## 2024-03-10 NOTE — PROGRESS NOTE ADULT - SUBJECTIVE AND OBJECTIVE BOX
Interval events:  -OR C3-C7 decompression, fusion  -DC'd faith      SUBJECTIVE/ROS:  [X] A ten-point review of systems was otherwise negative except as noted.  [ ] Due to altered mental status/intubation, subjective information were not able to be obtained from the patient. History was obtained, to the extent possible, from review of the chart and collateral sources of information.      NEURO  GCS: 15  Exam: awake, alert, oriented; RUE motor 2/5 RLE 4/5 sensation intact, LUE and LLE 5/5, C-collar in place.  Meds: acetaminophen   IVPB .. 1000 milliGRAM(s) IV Intermittent every 6 hours  butorphanol Injectable 0.25 milliGRAM(s) IV Push every 6 hours PRN Pruritus  HYDROmorphone PCA (1 mG/mL) 30 milliLiter(s) PCA Continuous PCA Continuous  ondansetron Injectable 4 milliGRAM(s) IV Push every 6 hours PRN Nausea    [x] Adequacy of sedation and pain control has been assessed and adjusted      RESPIRATORY  RR: 26 (03-10-24 @ 00:00) (12 - 41)  SpO2: 94% (03-10-24 @ 00:00) (93% - 100%)  Exam: unlabored, clear to auscultation bilaterally     ABG - ( 09 Mar 2024 20:27 )  pH: 7.35  /  pCO2: 44    /  pO2: 154   / HCO3: 24    / Base Excess: -1.4  /  SaO2: 96.6    Lactate: x            CARDIOVASCULAR  HR: 93 (03-10-24 @ 00:00) (80 - 109)  BP: 126/63 (03-10-24 @ 00:00) (102/56 - 159/91)  BP(mean): 88 (03-10-24 @ 00:00) (75 - 118)  ABP: 129/75 (03-10-24 @ 00:00) (129/75 - 175/86)  ABP(mean): 95 (03-10-24 @ 00:00) (87 - 121)    VBG - ( 09 Mar 2024 02:43 )  pH: 7.39  /  pCO2: 40    /  pO2: 122   / HCO3: 24    / Base Excess: -0.7  /  SaO2: 96.1   Lactate: 0.7                Exam: regular rate and rhythm  Cardiac Rhythm: sinus with BBB  Perfusion     [x]Adequate   [ ]Inadequate  Mentation   [x]Normal       [ ]Reduced  Extremities  [x]Warm         [ ]Cool  Volume Status [ ]Hypervolemic [x]Euvolemic [ ]Hypovolemic      GI/NUTRITION  Exam: soft, nontender, nondistended, incision C/D/I  Diet: Regular    GENITOURINARY  I&O's Detail    03-08 @ 07:01  -  03-09 @ 07:00  --------------------------------------------------------  IN:    dextrose 5% + lactated ringers: 825 mL    IV PiggyBack: 500 mL    IV PiggyBack: 100 mL    Oral Fluid: 240 mL    sodium chloride 0.9%: 250 mL  Total IN: 1915 mL    OUT:    Indwelling Catheter - Urethral (mL): 1285 mL    Voided (mL): 0 mL  Total OUT: 1285 mL    Total NET: 630 mL      03-09 @ 06:01  -  03-10 @ 00:15  --------------------------------------------------------  IN:    dextrose 5% + lactated ringers: 550 mL    IV PiggyBack: 100 mL    IV PiggyBack: 450 mL    Oral Fluid: 145 mL    sodium chloride 0.9%: 80 mL  Total IN: 1325 mL    OUT:    Accordian (mL): 105 mL    Indwelling Catheter - Urethral (mL): 890 mL  Total OUT: 995 mL    Total NET: 330 mL        Weight (kg): 69.3 (03-09 @ 09:19)  03-09    136  |  100  |  9   ----------------------------<  142<H>  4.2   |  23  |  0.61    Ca    8.4      09 Mar 2024 20:34  Phos  3.2     03-09  Mg     2.2     03-09    TPro  6.4  /  Alb  3.9  /  TBili  0.3  /  DBili  x   /  AST  25  /  ALT  14  /  AlkPhos  79  03-09    [X ] Rodriguez catheter, indication: acute urine retention  Meds: sodium chloride 0.9%. 1000 milliLiter(s) IV Continuous <Continuous>        HEMATOLOGIC  Meds:   [x] VTE Prophylaxis                        11.7   13.35 )-----------( 165      ( 09 Mar 2024 20:34 )             36.4     PT/INR - ( 09 Mar 2024 20:34 )   PT: 10.0 sec;   INR: 0.91 ratio         PTT - ( 09 Mar 2024 20:34 )  PTT:28.5 sec  Transfusion     [ ] PRBC   [ ] Platelets   [ ] FFP   [ ] Cryoprecipitate      INFECTIOUS DISEASES  WBC Count: 13.35 K/uL (03-09 @ 20:34)  WBC Count: 6.62 K/uL (03-09 @ 02:41)    RECENT CULTURES:    Meds: ceFAZolin   IVPB 2000 milliGRAM(s) IV Intermittent every 8 hours        ENDOCRINE  CAPILLARY BLOOD GLUCOSE      POCT Blood Glucose.: 111 mg/dL (09 Mar 2024 02:30)    Meds: dexAMETHasone  IVPB 8 milliGRAM(s) IV Intermittent every 8 hours        ACCESS DEVICES:  [X] Peripheral IV  [ ] Central Venous Line	[ ] R	[ ] L	[ ] IJ	[ ] Fem	[ ] SC	Placed:   [ ] Arterial Line		[ ] R	[ ] L	[ ] Fem	[ ] Rad	[ ] Ax	Placed:   [ ] PICC:					[ ] Mediport  [X] Urinary Catheter, Date Placed: 3/8 overnight  [x] Necessity of urinary, arterial, and venous catheters discussed    OTHER MEDICATIONS:  chlorhexidine 2% Cloths 1 Application(s) Topical <User Schedule>  naloxone Injectable 0.1 milliGRAM(s) IV Push every 3 minutes PRN      CODE STATUS: FULL

## 2024-03-11 LAB
ALBUMIN SERPL ELPH-MCNC: 3.5 G/DL — SIGNIFICANT CHANGE UP (ref 3.3–5)
ALBUMIN SERPL ELPH-MCNC: 3.8 G/DL — SIGNIFICANT CHANGE UP (ref 3.3–5)
ALP SERPL-CCNC: 71 U/L — SIGNIFICANT CHANGE UP (ref 40–120)
ALP SERPL-CCNC: 74 U/L — SIGNIFICANT CHANGE UP (ref 40–120)
ALT FLD-CCNC: 14 U/L — SIGNIFICANT CHANGE UP (ref 10–45)
ALT FLD-CCNC: 14 U/L — SIGNIFICANT CHANGE UP (ref 10–45)
ANION GAP SERPL CALC-SCNC: 10 MMOL/L — SIGNIFICANT CHANGE UP (ref 5–17)
ANION GAP SERPL CALC-SCNC: 11 MMOL/L — SIGNIFICANT CHANGE UP (ref 5–17)
APTT BLD: 26.1 SEC — SIGNIFICANT CHANGE UP (ref 24.5–35.6)
AST SERPL-CCNC: 25 U/L — SIGNIFICANT CHANGE UP (ref 10–40)
AST SERPL-CCNC: 29 U/L — SIGNIFICANT CHANGE UP (ref 10–40)
BILIRUB SERPL-MCNC: 0.2 MG/DL — SIGNIFICANT CHANGE UP (ref 0.2–1.2)
BILIRUB SERPL-MCNC: 0.3 MG/DL — SIGNIFICANT CHANGE UP (ref 0.2–1.2)
BUN SERPL-MCNC: 13 MG/DL — SIGNIFICANT CHANGE UP (ref 7–23)
BUN SERPL-MCNC: 17 MG/DL — SIGNIFICANT CHANGE UP (ref 7–23)
CALCIUM SERPL-MCNC: 8.9 MG/DL — SIGNIFICANT CHANGE UP (ref 8.4–10.5)
CALCIUM SERPL-MCNC: 9.1 MG/DL — SIGNIFICANT CHANGE UP (ref 8.4–10.5)
CHLORIDE SERPL-SCNC: 102 MMOL/L — SIGNIFICANT CHANGE UP (ref 96–108)
CHLORIDE SERPL-SCNC: 102 MMOL/L — SIGNIFICANT CHANGE UP (ref 96–108)
CO2 SERPL-SCNC: 27 MMOL/L — SIGNIFICANT CHANGE UP (ref 22–31)
CO2 SERPL-SCNC: 27 MMOL/L — SIGNIFICANT CHANGE UP (ref 22–31)
CREAT SERPL-MCNC: 0.66 MG/DL — SIGNIFICANT CHANGE UP (ref 0.5–1.3)
CREAT SERPL-MCNC: 0.68 MG/DL — SIGNIFICANT CHANGE UP (ref 0.5–1.3)
EGFR: 100 ML/MIN/1.73M2 — SIGNIFICANT CHANGE UP
EGFR: 99 ML/MIN/1.73M2 — SIGNIFICANT CHANGE UP
GLUCOSE SERPL-MCNC: 124 MG/DL — HIGH (ref 70–99)
GLUCOSE SERPL-MCNC: 128 MG/DL — HIGH (ref 70–99)
HCT VFR BLD CALC: 37.2 % — SIGNIFICANT CHANGE UP (ref 34.5–45)
HCT VFR BLD CALC: 37.6 % — SIGNIFICANT CHANGE UP (ref 34.5–45)
HGB BLD-MCNC: 12.1 G/DL — SIGNIFICANT CHANGE UP (ref 11.5–15.5)
HGB BLD-MCNC: 12.9 G/DL — SIGNIFICANT CHANGE UP (ref 11.5–15.5)
INR BLD: 0.91 RATIO — SIGNIFICANT CHANGE UP (ref 0.85–1.18)
MAGNESIUM SERPL-MCNC: 2.1 MG/DL — SIGNIFICANT CHANGE UP (ref 1.6–2.6)
MAGNESIUM SERPL-MCNC: 2.3 MG/DL — SIGNIFICANT CHANGE UP (ref 1.6–2.6)
MCHC RBC-ENTMCNC: 31.3 PG — SIGNIFICANT CHANGE UP (ref 27–34)
MCHC RBC-ENTMCNC: 32.5 GM/DL — SIGNIFICANT CHANGE UP (ref 32–36)
MCHC RBC-ENTMCNC: 32.7 PG — SIGNIFICANT CHANGE UP (ref 27–34)
MCHC RBC-ENTMCNC: 34.3 GM/DL — SIGNIFICANT CHANGE UP (ref 32–36)
MCV RBC AUTO: 95.2 FL — SIGNIFICANT CHANGE UP (ref 80–100)
MCV RBC AUTO: 96.4 FL — SIGNIFICANT CHANGE UP (ref 80–100)
NRBC # BLD: 0 /100 WBCS — SIGNIFICANT CHANGE UP (ref 0–0)
NRBC # BLD: 0 /100 WBCS — SIGNIFICANT CHANGE UP (ref 0–0)
PHOSPHATE SERPL-MCNC: 3.1 MG/DL — SIGNIFICANT CHANGE UP (ref 2.5–4.5)
PHOSPHATE SERPL-MCNC: 3.2 MG/DL — SIGNIFICANT CHANGE UP (ref 2.5–4.5)
PLATELET # BLD AUTO: 186 K/UL — SIGNIFICANT CHANGE UP (ref 150–400)
PLATELET # BLD AUTO: 187 K/UL — SIGNIFICANT CHANGE UP (ref 150–400)
POTASSIUM SERPL-MCNC: 4.5 MMOL/L — SIGNIFICANT CHANGE UP (ref 3.5–5.3)
POTASSIUM SERPL-MCNC: 4.6 MMOL/L — SIGNIFICANT CHANGE UP (ref 3.5–5.3)
POTASSIUM SERPL-SCNC: 4.5 MMOL/L — SIGNIFICANT CHANGE UP (ref 3.5–5.3)
POTASSIUM SERPL-SCNC: 4.6 MMOL/L — SIGNIFICANT CHANGE UP (ref 3.5–5.3)
PROT SERPL-MCNC: 6.2 G/DL — SIGNIFICANT CHANGE UP (ref 6–8.3)
PROT SERPL-MCNC: 6.4 G/DL — SIGNIFICANT CHANGE UP (ref 6–8.3)
PROTHROM AB SERPL-ACNC: 9.6 SEC — SIGNIFICANT CHANGE UP (ref 9.5–13)
RBC # BLD: 3.86 M/UL — SIGNIFICANT CHANGE UP (ref 3.8–5.2)
RBC # BLD: 3.95 M/UL — SIGNIFICANT CHANGE UP (ref 3.8–5.2)
RBC # FLD: 13.4 % — SIGNIFICANT CHANGE UP (ref 10.3–14.5)
RBC # FLD: 13.4 % — SIGNIFICANT CHANGE UP (ref 10.3–14.5)
SODIUM SERPL-SCNC: 139 MMOL/L — SIGNIFICANT CHANGE UP (ref 135–145)
SODIUM SERPL-SCNC: 140 MMOL/L — SIGNIFICANT CHANGE UP (ref 135–145)
WBC # BLD: 10.53 K/UL — HIGH (ref 3.8–10.5)
WBC # BLD: 11.25 K/UL — HIGH (ref 3.8–10.5)
WBC # FLD AUTO: 10.53 K/UL — HIGH (ref 3.8–10.5)
WBC # FLD AUTO: 11.25 K/UL — HIGH (ref 3.8–10.5)

## 2024-03-11 PROCEDURE — 71045 X-RAY EXAM CHEST 1 VIEW: CPT | Mod: 26

## 2024-03-11 RX ADMIN — NALOXEGOL OXALATE 12.5 MILLIGRAM(S): 12.5 TABLET, FILM COATED ORAL at 11:28

## 2024-03-11 RX ADMIN — Medication 650 MILLIGRAM(S): at 22:30

## 2024-03-11 RX ADMIN — Medication 650 MILLIGRAM(S): at 08:45

## 2024-03-11 RX ADMIN — Medication 650 MILLIGRAM(S): at 07:49

## 2024-03-11 RX ADMIN — POLYETHYLENE GLYCOL 3350 17 GRAM(S): 17 POWDER, FOR SOLUTION ORAL at 11:29

## 2024-03-11 RX ADMIN — Medication 650 MILLIGRAM(S): at 14:00

## 2024-03-11 RX ADMIN — Medication 101.6 MILLIGRAM(S): at 00:01

## 2024-03-11 RX ADMIN — OXYCODONE HYDROCHLORIDE 5 MILLIGRAM(S): 5 TABLET ORAL at 05:10

## 2024-03-11 RX ADMIN — Medication 101.6 MILLIGRAM(S): at 17:15

## 2024-03-11 RX ADMIN — SENNA PLUS 2 TABLET(S): 8.6 TABLET ORAL at 22:10

## 2024-03-11 RX ADMIN — OXYCODONE HYDROCHLORIDE 5 MILLIGRAM(S): 5 TABLET ORAL at 15:10

## 2024-03-11 RX ADMIN — OXYCODONE HYDROCHLORIDE 10 MILLIGRAM(S): 5 TABLET ORAL at 19:00

## 2024-03-11 RX ADMIN — OXYCODONE HYDROCHLORIDE 10 MILLIGRAM(S): 5 TABLET ORAL at 18:30

## 2024-03-11 RX ADMIN — Medication 650 MILLIGRAM(S): at 13:00

## 2024-03-11 RX ADMIN — OXYCODONE HYDROCHLORIDE 10 MILLIGRAM(S): 5 TABLET ORAL at 02:15

## 2024-03-11 RX ADMIN — OXYCODONE HYDROCHLORIDE 10 MILLIGRAM(S): 5 TABLET ORAL at 01:45

## 2024-03-11 RX ADMIN — Medication 650 MILLIGRAM(S): at 22:00

## 2024-03-11 RX ADMIN — OXYCODONE HYDROCHLORIDE 10 MILLIGRAM(S): 5 TABLET ORAL at 11:35

## 2024-03-11 RX ADMIN — Medication 3 MILLIGRAM(S): at 22:08

## 2024-03-11 RX ADMIN — Medication 1 TABLET(S): at 11:28

## 2024-03-11 RX ADMIN — OXYCODONE HYDROCHLORIDE 10 MILLIGRAM(S): 5 TABLET ORAL at 10:35

## 2024-03-11 RX ADMIN — ATORVASTATIN CALCIUM 10 MILLIGRAM(S): 80 TABLET, FILM COATED ORAL at 22:10

## 2024-03-11 RX ADMIN — CHLORHEXIDINE GLUCONATE 1 APPLICATION(S): 213 SOLUTION TOPICAL at 05:14

## 2024-03-11 RX ADMIN — Medication 101.6 MILLIGRAM(S): at 08:38

## 2024-03-11 NOTE — OCCUPATIONAL THERAPY INITIAL EVALUATION ADULT - LIVES WITH, PROFILE
Pt lives in apt with  on the 2nd floor (14 steps) and 1 level when inside. Pt has tub shower with doors -grab bars. Prior to admission, pt was (I) in ADLs and functional transfers. -AD/DME./spouse

## 2024-03-11 NOTE — DIETITIAN INITIAL EVALUATION ADULT - OTHER INFO
GI/Intake:   -Good PO intake in-house   -No BM documented thus far; bowel regimen ordered (Miralax, Senna, Movantik); Protonix ordered   -Hx of Gastric bypass x 16years ago (292 pounds starting weight)     Endo:   -Steroid regimen ordered (Dexamethasone)   -No insulin coverage noted   -No hx of DM     Neuro:   -S/p Lami with fusion (3/9)     Weight Hx:   -Current dosin pounds   -Weight has fluctuated from 140-160 pounds x past few years

## 2024-03-11 NOTE — PROGRESS NOTE ADULT - SUBJECTIVE AND OBJECTIVE BOX
MELVI SCHMIDT   [id number]    Patient stable, tolerating diet, pain controlled on regimen. Worked with PT today. Numbness is improving.     T(C): 37 (03-11-24 @ 11:00), Max: 37.3 (03-11-24 @ 03:00)  HR: 75 (03-11-24 @ 12:00) (71 - 94)  BP: 131/67 (03-11-24 @ 12:00) (114/64 - 161/96)  RR: 16 (03-11-24 @ 12:00) (14 - 40)  SpO2: 94% (03-11-24 @ 12:00) (93% - 99%)  Wt(kg): --  NAD  Back: Dressing clean/dry/adherent.  Soft.  No collection.  Drain in situ.      03-10 @ 07:01  -  03-11 @ 07:00  --------------------------------------------------------  IN: 2823.2 mL / OUT: 3375 mL / NET: -551.8 mL    03-11 @ 07:01  -  03-11 @ 14:00  --------------------------------------------------------  IN: 250 mL / OUT: 330 mL / NET: -80 mL      Hemovac: SS output  acetaminophen     Tablet .. 650 milliGRAM(s) Oral every 6 hours PRN  atorvastatin 10 milliGRAM(s) Oral at bedtime  butorphanol Injectable 0.25 milliGRAM(s) IV Push every 6 hours PRN  chlorhexidine 2% Cloths 1 Application(s) Topical <User Schedule>  dexAMETHasone     Tablet 3 milliGRAM(s) Oral every 6 hours  dexAMETHasone     Tablet   Oral   dexAMETHasone  IVPB 8 milliGRAM(s) IV Intermittent every 8 hours  multivitamin 1 Tablet(s) Oral daily  naloxegol 12.5 milliGRAM(s) Oral daily  naloxone Injectable 0.1 milliGRAM(s) IV Push every 3 minutes PRN  ondansetron Injectable 4 milliGRAM(s) IV Push every 6 hours PRN  oxyCODONE    IR 10 milliGRAM(s) Oral every 4 hours PRN  oxyCODONE    IR 5 milliGRAM(s) Oral every 4 hours PRN  pantoprazole    Tablet 40 milliGRAM(s) Oral before breakfast PRN  polyethylene glycol 3350 17 Gram(s) Oral daily  senna 2 Tablet(s) Oral at bedtime                            12.9   11.25 )-----------( 186      ( 11 Mar 2024 05:28 )             37.6     03-11    140  |  102  |  13  ----------------------------<  128<H>  4.6   |  27  |  0.68    Ca    9.1      11 Mar 2024 05:28  Phos  3.1     03-11  Mg     2.3     03-11    TPro  6.4  /  Alb  3.8  /  TBili  0.3  /  DBili  x   /  AST  29  /  ALT  14  /  AlkPhos  74  03-11      A/P: S/P posterior spine fusion with muscle flap reconstruction.  - Diet  - Pain control  - Drain Monitoring. Remove when output <50 cc in 24 hrs two days in a row  - DVT PPx: SCD, chemoprophylaxis as per spine service  - Will Follow    Thank You  Zakia Jason MD  Plastic Surgery

## 2024-03-11 NOTE — DIETITIAN INITIAL EVALUATION ADULT - PERTINENT MEDS FT
MEDICATIONS  (STANDING):  atorvastatin 10 milliGRAM(s) Oral at bedtime  chlorhexidine 2% Cloths 1 Application(s) Topical <User Schedule>  dexAMETHasone     Tablet 3 milliGRAM(s) Oral every 6 hours  dexAMETHasone     Tablet   Oral   dexAMETHasone  IVPB 8 milliGRAM(s) IV Intermittent every 8 hours  multivitamin 1 Tablet(s) Oral daily  naloxegol 12.5 milliGRAM(s) Oral daily  polyethylene glycol 3350 17 Gram(s) Oral daily  senna 2 Tablet(s) Oral at bedtime    MEDICATIONS  (PRN):  acetaminophen     Tablet .. 650 milliGRAM(s) Oral every 6 hours PRN Mild Pain (1 - 3)  butorphanol Injectable 0.25 milliGRAM(s) IV Push every 6 hours PRN Pruritus  naloxone Injectable 0.1 milliGRAM(s) IV Push every 3 minutes PRN For ANY of the following changes in patient status:  A. RR LESS THAN 10 breaths per minute, B. Oxygen saturation LESS THAN 90%, C. Sedation score of 6  ondansetron Injectable 4 milliGRAM(s) IV Push every 6 hours PRN Nausea  oxyCODONE    IR 10 milliGRAM(s) Oral every 4 hours PRN Severe Pain (7 - 10)  oxyCODONE    IR 5 milliGRAM(s) Oral every 4 hours PRN Moderate Pain (4 - 6)  pantoprazole    Tablet 40 milliGRAM(s) Oral before breakfast PRN Acid Reflux

## 2024-03-11 NOTE — DIETITIAN INITIAL EVALUATION ADULT - ORAL INTAKE PTA/DIET HISTORY
PTA per pt  -Intake: good PO intake; follows low CHO diet s/p gastric bypass x 16 years ago   -Chewing/Swallowing: denies difficulty   -Allergies/Intolerances: NKFA  -Vitamins/Supplements: multivitamin, Biotin, B12

## 2024-03-11 NOTE — OCCUPATIONAL THERAPY INITIAL EVALUATION ADULT - GENERAL OBSERVATIONS, REHAB EVAL
Pt was found semi-supine in bed, +hard collar, +tele, +pulse ox, +IVL, +cardiac monitoring. Pt was found semi-supine in bed, +hard collar, +hemavac, +ICU cardiac monitoring. Pt was found semi-supine in bed, +hard c-collar, +hemavac, +ICU cardiac monitoring.

## 2024-03-11 NOTE — DIETITIAN INITIAL EVALUATION ADULT - REASON FOR ADMISSION
"63y Female PMH HTN, HLD, depression, gastric bypass who presents c/o cervical pain and RUE weakness. Per patient, she had a syncopal fall last night and does not recall the events; had LOC. When she woke up, had severe pain and noticed new onset pain and weakness in the RUE. She endorses severe pain in bilateral shoulders and arms. Denies other numbness/tingling/paresthesias/weakness. Denies bowel/bladder incontinence. Denies fevers/chills. No other complaints at this time. Admitted for new onset neurologic deficits and allodynia concerning for central cord syndrome. Now s/p C3-7 decompression and fusion."

## 2024-03-11 NOTE — DIETITIAN INITIAL EVALUATION ADULT - ADD RECOMMEND
1) Continue regular diet   2) Monitor PO intake and add PO nutrition supplements PRN   3) Monitor diet tolerance, weight trends, labs, GI function, and skin integrity    Cherrie Sebastian MS, RDN, CDN ([employer])

## 2024-03-11 NOTE — PROGRESS NOTE ADULT - SUBJECTIVE AND OBJECTIVE BOX
Orthopedic Surgery      Pt seen and examined in SICU. Pt denies any overnight events. Pt reports pain is well controlled. Pt denies any fever/chills/chest pain/nausea/vomiting. Reports numbness in RUE continues to improve. Patient looking forward to working with PT today.     ICU Vital Signs Last 24 Hrs  T(C): 37.3 (11 Mar 2024 03:00), Max: 37.3 (11 Mar 2024 03:00)  T(F): 99.1 (11 Mar 2024 03:00), Max: 99.1 (11 Mar 2024 03:00)  HR: 71 (11 Mar 2024 06:30) (71 - 94)  BP: 127/61 (11 Mar 2024 06:30) (114/64 - 159/74)  BP(mean): 87 (11 Mar 2024 06:30) (81 - 111)  ABP: --  ABP(mean): --  RR: 24 (11 Mar 2024 06:30) (13 - 40)  SpO2: 95% (11 Mar 2024 06:30) (93% - 100%)          Physical exam:   Gen: No acute distress. Cervical collar in place  Resp: Breathing comfortably on room air    Motor exam:          Upper extremity       C5 (Shoulder Abd)    C6 (Elbow flex)   C7 (Elbow ext)   C8 (Finger flex) T1 (finger abd)                                               R         2/5                 2/5                       2/5                      3/5                         3/5                                                L          4/5                 4/5                      4/5                      5/5                         5/5                   Lower extremity         L2 (Hip flex)  L3 (knee ext)  L4 (Dorsi flex)  L5 (EHL)  S1 (Plantar flex)                                                                                           R        4/5            4/5             5/5                    5/5          5/5                                                L         5/5            5/5             5/5                   5/5           5/5    Hemovac with serosanguineous fluid in bulb, no leakage.      LABS:                        12.9   11.25 )-----------( 186      ( 11 Mar 2024 05:28 )             37.6     03-11    140  |  102  |  13  ----------------------------<  128<H>  4.6   |  27  |  0.68    Ca    9.1      11 Mar 2024 05:28  Phos  3.1     03-11  Mg     2.3     03-11    TPro  6.4  /  Alb  3.8  /  TBili  0.3  /  DBili  x   /  AST  29  /  ALT  14  /  AlkPhos  74  03-11    PT/INR - ( 11 Mar 2024 05:28 )   PT: 9.6 sec;   INR: 0.91 ratio         PTT - ( 11 Mar 2024 05:28 )  PTT:26.1 sec  Urinalysis Basic - ( 11 Mar 2024 05:28 )    Color: x / Appearance: x / SG: x / pH: x  Gluc: 128 mg/dL / Ketone: x  / Bili: x / Urobili: x   Blood: x / Protein: x / Nitrite: x   Leuk Esterase: x / RBC: x / WBC x   Sq Epi: x / Non Sq Epi: x / Bacteria: x              Assessment/Plan: Patient is a 63 year old female s/p C3-7 Laminectomy/PSF on 3/9/24, continuing to recover in SICU and improving.       Chito Ivory PGY-1  - MAP goals > 85 mm Hg x 48 hours.   - Q1 neuro checks.   - Pain control: PCA; Do not give Toradol.   - DVT Ppx: Hold in the setting of recent spinal surgery due to increased risk of epidural hematoma; Will consider Lovenox on POD2/POD3.    - HMV drain in place draining sero-sanguinous fluid; Monitor output.   - Steroids: 48 hours IV Decadron followed by PO taper.    - WBAT / OOB with assistance as needed.   - Hard collar at all times.   - Rodriguez.   - PT/OT  - Encourage incentive spirometry.   -Appreciate care per SICU  - Will discuss with Dr. Layton and will advise if plan changes.      For any questions please contact the on call orthopedic surgery team, please do not reach out via teams.  [facility] [place]: [phone]  J pager: [phone]  [facility] pager: [phone]

## 2024-03-11 NOTE — PROGRESS NOTE ADULT - SUBJECTIVE AND OBJECTIVE BOX
Interval events:  - D/C PCA  - Rodriguez removed, passed TOV  - CT wrist done to eval for scaphoid fx, f/u read    SUBJECTIVE/ROS:  [ ] A ten-point review of systems was otherwise negative except as noted.  [ ] Due to altered mental status/intubation, subjective information were not able to be obtained from the patient. History was obtained, to the extent possible, from review of the chart and collateral sources of information.      NEURO  Exam: awake, alert, oriented  Meds: acetaminophen     Tablet .. 650 milliGRAM(s) Oral every 6 hours PRN Mild Pain (1 - 3)  butorphanol Injectable 0.25 milliGRAM(s) IV Push every 6 hours PRN Pruritus  ondansetron Injectable 4 milliGRAM(s) IV Push every 6 hours PRN Nausea  oxyCODONE    IR 5 milliGRAM(s) Oral every 4 hours PRN Moderate Pain (4 - 6)  oxyCODONE    IR 10 milliGRAM(s) Oral every 4 hours PRN Severe Pain (7 - 10)  [x] Adequacy of sedation and pain control has been assessed and adjusted      RESPIRATORY  RR: 20 (03-11-24 @ 00:00) (13 - 35)  SpO2: 94% (03-11-24 @ 00:00) (92% - 100%)  Exam: unlabored, clear to auscultation bilaterally  Mechanical Ventilation:   ABG - ( 09 Mar 2024 20:27 )  pH: 7.35  /  pCO2: 44    /  pO2: 154   / HCO3: 24    / Base Excess: -1.4  /  SaO2: 96.6        CARDIOVASCULAR  HR: 85 (03-11-24 @ 00:00) (75 - 94)  BP: 128/74 (03-11-24 @ 00:00) (110/57 - 159/74)  BP(mean): 95 (03-11-24 @ 00:00) (79 - 111)  VBG - ( 10 Mar 2024 17:50 )  pH: 7.46  /  pCO2: 41    /  pO2: 48    / HCO3: 29    / Base Excess: 4.9   /  SaO2: 79.1   Lactate: 1.1      Exam: regular rate and rhythm  Cardiac Rhythm: sinus  Perfusion     [x]Adequate   [ ]Inadequate  Mentation   [x]Normal       [ ]Reduced  Extremities  [x]Warm         [ ]Cool  Volume Status [ ]Hypervolemic [x]Euvolemic [ ]Hypovolemic  Meds: phenylephrine    Infusion 0.2 MICROgram(s)/kG/Min IV Continuous <Continuous>      GI/NUTRITION  Exam: soft, nontender, nondistended  Diet: regular  Meds: naloxegol 12.5 milliGRAM(s) Oral daily  pantoprazole    Tablet 40 milliGRAM(s) Oral before breakfast PRN Acid Reflux  polyethylene glycol 3350 17 Gram(s) Oral daily  senna 2 Tablet(s) Oral at bedtime      GENITOURINARY  I&O's Detail    03-09 @ 06:01  -  03-10 @ 07:00  --------------------------------------------------------  IN:    dextrose 5% + lactated ringers: 550 mL    IV PiggyBack: 100 mL    IV PiggyBack: 550 mL    multiple electrolytes Injection Type 1 Bolus: 1000 mL    Oral Fluid: 265 mL    Phenylephrine: 15.6 mL    sodium chloride 0.9%: 200 mL  Total IN: 2680.6 mL    OUT:    Accordian (mL): 175 mL    Indwelling Catheter - Urethral (mL): 1970 mL  Total OUT: 2145 mL    Total NET: 535.6 mL      03-10 @ 07:01 - 03-11 @ 00:09  --------------------------------------------------------  IN:    IV PiggyBack: 350 mL    Oral Fluid: 1290 mL    Phenylephrine: 43.2 mL    sodium chloride 0.9%: 400 mL    Sodium Chloride 0.9% Bolus: 500 mL  Total IN: 2583.2 mL    OUT:    Accordian (mL): 90 mL    Indwelling Catheter - Urethral (mL): 1555 mL    Voided (mL): 1400 mL  Total OUT: 3045 mL    Total NET: -461.8 mL        03-10    139  |  104  |  9   ----------------------------<  132<H>  4.7   |  25  |  0.78    Ca    9.0      10 Mar 2024 17:53  Phos  3.0     03-10  Mg     2.3     03-10    TPro  6.2  /  Alb  3.7  /  TBili  0.3  /  DBili  x   /  AST  31  /  ALT  13  /  AlkPhos  73  03-10    [ ] Rodriguez catheter, indication: N/A  Meds: multivitamin 1 Tablet(s) Oral daily      HEMATOLOGIC  Meds:   [x] VTE Prophylaxis                        12.4   12.52 )-----------( 187      ( 10 Mar 2024 17:53 )             36.9     PT/INR - ( 10 Mar 2024 17:53 )   PT: 9.7 sec;   INR: 0.92 ratio         PTT - ( 10 Mar 2024 17:53 )  PTT:23.8 sec  Transfusion     [ ] PRBC   [ ] Platelets   [ ] FFP   [ ] Cryoprecipitate      INFECTIOUS DISEASES  WBC Count: 12.52 K/uL (03-10 @ 17:53)      ENDOCRINE  CAPILLARY BLOOD GLUCOSE  Meds: atorvastatin 10 milliGRAM(s) Oral at bedtime  dexAMETHasone     Tablet   Oral   dexAMETHasone     Tablet 3 milliGRAM(s) Oral every 6 hours  dexAMETHasone  IVPB 8 milliGRAM(s) IV Intermittent every 8 hours      OTHER MEDICATIONS:  chlorhexidine 2% Cloths 1 Application(s) Topical <User Schedule>  naloxone Injectable 0.1 milliGRAM(s) IV Push every 3 minutes PRN      CODE STATUS: full code

## 2024-03-11 NOTE — OCCUPATIONAL THERAPY INITIAL EVALUATION ADULT - IMPAIRED TRANSFERS: BED/CHAIR, REHAB EVAL
impaired balance/impaired coordination/pain/decreased ROM/decreased strength impaired motor control when taking steps/impaired balance/impaired coordination/pain/decreased ROM/decreased strength

## 2024-03-11 NOTE — PROGRESS NOTE ADULT - SUBJECTIVE AND OBJECTIVE BOX
63-year-old female history of gastric bypass, hypertension, hyperlipidemia, depression, social EtOH use, transferred from [place] for C5-C6 vertebral body fracture.  Yesterday patient got up to go to the kitchen, felt lightheaded, had a syncopal episode and fell on the floor and laid on the floor all night because of right arm and right leg weakness.  Eventually was able to make a call and presented to the ED at [place].  Continues to complain of right upper lower extremity weakness, and bilateral shoulder pain.  Denies chest pain or shortness of breath, nausea vomiting, also complains of abdominal upper back pain. Patient was transferred to [facility] for further treatment. Patient is s/p a C3-7 decompression. She tolerated the procedure well. Patient has started working with physical therapy       MEDICATIONS  (STANDING):  atorvastatin 10 milliGRAM(s) Oral at bedtime  chlorhexidine 2% Cloths 1 Application(s) Topical <User Schedule>  dexAMETHasone     Tablet 3 milliGRAM(s) Oral every 6 hours  dexAMETHasone     Tablet   Oral   dexAMETHasone  IVPB 8 milliGRAM(s) IV Intermittent every 8 hours  multivitamin 1 Tablet(s) Oral daily  naloxegol 12.5 milliGRAM(s) Oral daily  polyethylene glycol 3350 17 Gram(s) Oral daily  senna 2 Tablet(s) Oral at bedtime    MEDICATIONS  (PRN):  acetaminophen     Tablet .. 650 milliGRAM(s) Oral every 6 hours PRN Mild Pain (1 - 3)  butorphanol Injectable 0.25 milliGRAM(s) IV Push every 6 hours PRN Pruritus  naloxone Injectable 0.1 milliGRAM(s) IV Push every 3 minutes PRN For ANY of the following changes in patient status:  A. RR LESS THAN 10 breaths per minute, B. Oxygen saturation LESS THAN 90%, C. Sedation score of 6  ondansetron Injectable 4 milliGRAM(s) IV Push every 6 hours PRN Nausea  oxyCODONE    IR 10 milliGRAM(s) Oral every 4 hours PRN Severe Pain (7 - 10)  oxyCODONE    IR 5 milliGRAM(s) Oral every 4 hours PRN Moderate Pain (4 - 6)  pantoprazole    Tablet 40 milliGRAM(s) Oral before breakfast PRN Acid Reflux          VITALS:   T(C): 37.3 (03-11-24 @ 15:00), Max: 37.3 (03-11-24 @ 03:00)  HR: 77 (03-11-24 @ 16:00) (71 - 94)  BP: 147/82 (03-11-24 @ 16:00) (114/64 - 161/96)  RR: 24 (03-11-24 @ 16:00) (14 - 40)  SpO2: 95% (03-11-24 @ 16:00) (93% - 99%)  Wt(kg): --        PHYSICAL EXAM:  GENERAL: NAD, well-groomed, well-developed  HEAD:  Atraumatic, Normocephalic  EYES: EOMI, PERRLA, conjunctiva and sclera clear  ENMT: No tonsillar erythema, exudates, or enlargement; Moist mucous membranes, Good dentition, No lesions  NECK: Supple, No JVD, Normal thyroid  NERVOUS SYSTEM:  Alert & Oriented X3, Good concentration b/l UE weakness right greater the left  CHEST/LUNG: Clear to percussion bilaterally; No rales, rhonchi, wheezing, or rubs  HEART: Regular rate and rhythm; No murmurs, rubs, or gallops  ABDOMEN: Soft, Nontender, Nondistended; Bowel sounds present  EXTREMITIES:  2+ Peripheral Pulses, No clubbing, cyanosis, or edema  LYMPH: No lymphadenopathy noted  SKIN: No rashes or lesions      LABS:  ABG - ( 09 Mar 2024 20:27 )  pH, Arterial: 7.35  pH, Blood: x     /  pCO2: 44    /  pO2: 154   / HCO3: 24    / Base Excess: -1.4  /  SaO2: 96.6                  CBC Full  -  ( 11 Mar 2024 05:28 )  WBC Count : 11.25 K/uL  RBC Count : 3.95 M/uL  Hemoglobin : 12.9 g/dL  Hematocrit : 37.6 %  Platelet Count - Automated : 186 K/uL  Mean Cell Volume : 95.2 fl  Mean Cell Hemoglobin : 32.7 pg  Mean Cell Hemoglobin Concentration : 34.3 gm/dL  Auto Neutrophil # : x  Auto Lymphocyte # : x  Auto Monocyte # : x  Auto Eosinophil # : x  Auto Basophil # : x  Auto Neutrophil % : x  Auto Lymphocyte % : x  Auto Monocyte % : x  Auto Eosinophil % : x  Auto Basophil % : x    03-11    140  |  102  |  13  ----------------------------<  128<H>  4.6   |  27  |  0.68    Ca    9.1      11 Mar 2024 05:28  Phos  3.1     03-11  Mg     2.3     03-11    TPro  6.4  /  Alb  3.8  /  TBili  0.3  /  DBili  x   /  AST  29  /  ALT  14  /  AlkPhos  74  03-11    LIVER FUNCTIONS - ( 11 Mar 2024 05:28 )  Alb: 3.8 g/dL / Pro: 6.4 g/dL / ALK PHOS: 74 U/L / ALT: 14 U/L / AST: 29 U/L / GGT: x           PT/INR - ( 11 Mar 2024 05:28 )   PT: 9.6 sec;   INR: 0.91 ratio         PTT - ( 11 Mar 2024 05:28 )  PTT:26.1 sec  Urinalysis Basic - ( 11 Mar 2024 05:28 )    Color: x / Appearance: x / SG: x / pH: x  Gluc: 128 mg/dL / Ketone: x  / Bili: x / Urobili: x   Blood: x / Protein: x / Nitrite: x   Leuk Esterase: x / RBC: x / WBC x   Sq Epi: x / Non Sq Epi: x / Bacteria: x      CAPILLARY BLOOD GLUCOSE          RADIOLOGY & ADDITIONAL TESTS:

## 2024-03-11 NOTE — DIETITIAN INITIAL EVALUATION ADULT - PERTINENT LABORATORY DATA
03-11    140  |  102  |  13  ----------------------------<  128<H>  4.6   |  27  |  0.68    Ca    9.1      11 Mar 2024 05:28  Phos  3.1     03-11  Mg     2.3     03-11    TPro  6.4  /  Alb  3.8  /  TBili  0.3  /  DBili  x   /  AST  29  /  ALT  14  /  AlkPhos  74  03-11

## 2024-03-11 NOTE — OCCUPATIONAL THERAPY INITIAL EVALUATION ADULT - PERTINENT HX OF CURRENT PROBLEM, REHAB EVAL
Patient is a 63y Female PMH HTN, HLD, depression, gastric bypass who presents c/o cervical pain and RUE weakness. Per patient, she had a syncopal fall last night and does not recall the events; had LOC. When she woke up, had severe pain and noticed new onset pain and weakness in the RUE. She endorses severe pain in bilateral shoulders and arms. Denies other numbness/tingling/paresthesias/weakness. Denies bowel/bladder incontinence. Denies fevers/chills. No other complaints at this time.  S/p C3-7 laminectomy  CT wrist 3/10- No acute displaced fracture.  CT head and neck 3/8- CTA brain: No flow-limiting stenosis or vascular aneurysm. No AVM. Venous system is well opacified, no evidence for venous sinus or cortical vein thrombosis. CTA neck: No flow-limiting stenosis, evidence for arterial dissection, or vascular aneurysm.  CT chest 3/8- No acute intrathoracic pathology. 1.9 x 2.0 cm left interpolar renal isodense lesion, renal ultrasound needed for further characterization.  CT head and spine 3/8- No acute intracranial  hemorrhage or calvarial fracture. C5 and C6 vertebral body ACUTE anterior inferior endplate vertebral body displaced corner fractures. No subluxation. Multilevel cervical spondylosis and degenerative disease and spinal stenoses. Severe C3-4, moderate to severe C4-5 and C5-6 and moderate C6-7 central spinal stenoses. A follow-up MRI of cervical spine may prove helpful for further evaluation.  MRI spine 3/8- MR cervical spine: Anterior inferior corner fractures at C5 and C6 are better seen on the CT of the cervical spine seen from earlier in the same day. There is associated prominent prevertebral edema from C2 to C5. No definite evidence of ligamentous injury. No traumatic subluxation. Advanced multilevel degenerative changes contribute to high-grade multilevel central canal stenosis as detailed above. Notably there is severe central canal stenosis and cord compression at C3-C4, C4-C5 and C5-C6. No cervical cord signal abnormality. MR thoracic spine: No fracture or acute traumatic malalignment. MR lumbar spine: Chronic mild compression deformity at L1. Degenerative changes as described.  Xray hand and wrist 3/8- No acute fracture or dislocation. STT osteoarthritis. No other advanced   arthritic changes.  Xray chest/shoulder/humerus 3/8- No acute findings.

## 2024-03-11 NOTE — PROGRESS NOTE ADULT - ASSESSMENT
Patient is a 63y Female PMH HTN, HLD, depression, gastric bypass who presents c/o cervical pain and RUE weakness. Per patient, she had a syncopal fall last night and does not recall the events; had LOC. When she woke up, had severe pain and noticed new onset pain and weakness in the RUE. She endorses severe pain in bilateral shoulders and arms. Denies other numbness/tingling/paresthesias/weakness. Denies bowel/bladder incontinence. Denies fevers/chills. No other complaints at this time. Admitted for new onset neurologic deficits and allodynia concerning for central cord syndrome. Now s/p C3-7 decompression and fusion.     PLAN:  Neuro  -C5-C6 fracture s/p decompression & fusion, remains on c-collar  -Q1hour neuro checks  -Pain: Tylenol, Oxy    Resp  -Maintain spo2 >92%, on RA    Cards  -TTE grossly normal w/o valvular dysfunction (3/8-)  -Normal TTE  -Phenylephrine gtt off since 1900 3/10 keep MAP > 85 for 48 hours post op  -Atorvastatin    GI  -Diet: Regular   -Protonix  -Multivitamin      -IVL  -voiding spontaneously    Heme  -Holding chem DVT prophylaxis per Ortho   -Mechanical DVT prophylaxsis SCDs    ID  -Ancef x 24 hrs post-op    Endo-  -IV decadron for cord compression    Ortho:  -S/p 3/9 c3-c7 decompression/ fusion  -F/u CT R wrist (snuff box tenderness)  -OOB to chair, must remain in collar    Dispo: SICU

## 2024-03-12 RX ORDER — OXYCODONE HYDROCHLORIDE 5 MG/1
5 TABLET ORAL ONCE
Refills: 0 | Status: DISCONTINUED | OUTPATIENT
Start: 2024-03-12 | End: 2024-03-12

## 2024-03-12 RX ORDER — TRAMADOL HYDROCHLORIDE 50 MG/1
50 TABLET ORAL EVERY 6 HOURS
Refills: 0 | Status: DISCONTINUED | OUTPATIENT
Start: 2024-03-12 | End: 2024-03-16

## 2024-03-12 RX ORDER — BENZOCAINE AND MENTHOL 5; 1 G/100ML; G/100ML
1 LIQUID ORAL
Refills: 0 | Status: DISCONTINUED | OUTPATIENT
Start: 2024-03-12 | End: 2024-03-16

## 2024-03-12 RX ORDER — ACETAMINOPHEN 500 MG
975 TABLET ORAL EVERY 8 HOURS
Refills: 0 | Status: DISCONTINUED | OUTPATIENT
Start: 2024-03-12 | End: 2024-03-16

## 2024-03-12 RX ADMIN — Medication 650 MILLIGRAM(S): at 17:54

## 2024-03-12 RX ADMIN — OXYCODONE HYDROCHLORIDE 10 MILLIGRAM(S): 5 TABLET ORAL at 20:52

## 2024-03-12 RX ADMIN — OXYCODONE HYDROCHLORIDE 10 MILLIGRAM(S): 5 TABLET ORAL at 06:40

## 2024-03-12 RX ADMIN — ATORVASTATIN CALCIUM 10 MILLIGRAM(S): 80 TABLET, FILM COATED ORAL at 22:10

## 2024-03-12 RX ADMIN — OXYCODONE HYDROCHLORIDE 10 MILLIGRAM(S): 5 TABLET ORAL at 07:10

## 2024-03-12 RX ADMIN — POLYETHYLENE GLYCOL 3350 17 GRAM(S): 17 POWDER, FOR SOLUTION ORAL at 11:01

## 2024-03-12 RX ADMIN — BENZOCAINE AND MENTHOL 1 LOZENGE: 5; 1 LIQUID ORAL at 14:37

## 2024-03-12 RX ADMIN — Medication 1 TABLET(S): at 11:01

## 2024-03-12 RX ADMIN — Medication 10 MILLIGRAM(S): at 10:52

## 2024-03-12 RX ADMIN — Medication 3 MILLIGRAM(S): at 16:12

## 2024-03-12 RX ADMIN — Medication 3 MILLIGRAM(S): at 11:00

## 2024-03-12 RX ADMIN — OXYCODONE HYDROCHLORIDE 5 MILLIGRAM(S): 5 TABLET ORAL at 18:50

## 2024-03-12 RX ADMIN — Medication 650 MILLIGRAM(S): at 11:20

## 2024-03-12 RX ADMIN — OXYCODONE HYDROCHLORIDE 10 MILLIGRAM(S): 5 TABLET ORAL at 12:15

## 2024-03-12 RX ADMIN — NALOXEGOL OXALATE 12.5 MILLIGRAM(S): 12.5 TABLET, FILM COATED ORAL at 11:01

## 2024-03-12 RX ADMIN — BENZOCAINE AND MENTHOL 1 LOZENGE: 5; 1 LIQUID ORAL at 20:52

## 2024-03-12 RX ADMIN — Medication 650 MILLIGRAM(S): at 10:50

## 2024-03-12 RX ADMIN — Medication 975 MILLIGRAM(S): at 22:10

## 2024-03-12 RX ADMIN — OXYCODONE HYDROCHLORIDE 10 MILLIGRAM(S): 5 TABLET ORAL at 12:45

## 2024-03-12 RX ADMIN — BENZOCAINE AND MENTHOL 1 LOZENGE: 5; 1 LIQUID ORAL at 01:54

## 2024-03-12 RX ADMIN — OXYCODONE HYDROCHLORIDE 5 MILLIGRAM(S): 5 TABLET ORAL at 15:05

## 2024-03-12 RX ADMIN — OXYCODONE HYDROCHLORIDE 10 MILLIGRAM(S): 5 TABLET ORAL at 01:55

## 2024-03-12 RX ADMIN — OXYCODONE HYDROCHLORIDE 5 MILLIGRAM(S): 5 TABLET ORAL at 17:55

## 2024-03-12 RX ADMIN — OXYCODONE HYDROCHLORIDE 10 MILLIGRAM(S): 5 TABLET ORAL at 02:25

## 2024-03-12 RX ADMIN — OXYCODONE HYDROCHLORIDE 5 MILLIGRAM(S): 5 TABLET ORAL at 15:35

## 2024-03-12 RX ADMIN — Medication 2 MILLIGRAM(S): at 22:10

## 2024-03-12 RX ADMIN — OXYCODONE HYDROCHLORIDE 10 MILLIGRAM(S): 5 TABLET ORAL at 21:52

## 2024-03-12 RX ADMIN — BENZOCAINE AND MENTHOL 1 LOZENGE: 5; 1 LIQUID ORAL at 06:40

## 2024-03-12 RX ADMIN — CHLORHEXIDINE GLUCONATE 1 APPLICATION(S): 213 SOLUTION TOPICAL at 05:07

## 2024-03-12 RX ADMIN — Medication 650 MILLIGRAM(S): at 18:50

## 2024-03-12 RX ADMIN — Medication 3 MILLIGRAM(S): at 05:07

## 2024-03-12 NOTE — PROGRESS NOTE ADULT - SUBJECTIVE AND OBJECTIVE BOX
63-year-old female history of gastric bypass, hypertension, hyperlipidemia, depression, social EtOH use, transferred from [place] for C5-C6 vertebral body fracture.  Yesterday patient got up to go to the kitchen, felt lightheaded, had a syncopal episode and fell on the floor and laid on the floor all night because of right arm and right leg weakness.  Eventually was able to make a call and presented to the ED at [place].  Continues to complain of right upper lower extremity weakness, and bilateral shoulder pain.  Denies chest pain or shortness of breath, nausea vomiting, also complains of abdominal upper back pain. Patient was transferred to [facility] for further treatment. Patient is s/p a C3-7 decompression. She tolerated the procedure well. Patient has started working with physical therapy. Pain has been well managed.          MEDICATIONS  (STANDING):  atorvastatin 10 milliGRAM(s) Oral at bedtime  chlorhexidine 2% Cloths 1 Application(s) Topical <User Schedule>  dexAMETHasone     Tablet 3 milliGRAM(s) Oral every 6 hours  dexAMETHasone     Tablet 2 milliGRAM(s) Oral every 6 hours  dexAMETHasone     Tablet   Oral   multivitamin 1 Tablet(s) Oral daily  naloxegol 12.5 milliGRAM(s) Oral daily  polyethylene glycol 3350 17 Gram(s) Oral daily  senna 2 Tablet(s) Oral at bedtime    MEDICATIONS  (PRN):  acetaminophen     Tablet .. 650 milliGRAM(s) Oral every 6 hours PRN Mild Pain (1 - 3)  benzocaine/menthol Lozenge 1 Lozenge Oral every 2 hours PRN Sore Throat  butorphanol Injectable 0.25 milliGRAM(s) IV Push every 6 hours PRN Pruritus  naloxone Injectable 0.1 milliGRAM(s) IV Push every 3 minutes PRN For ANY of the following changes in patient status:  A. RR LESS THAN 10 breaths per minute, B. Oxygen saturation LESS THAN 90%, C. Sedation score of 6  ondansetron Injectable 4 milliGRAM(s) IV Push every 6 hours PRN Nausea  oxyCODONE    IR 10 milliGRAM(s) Oral every 4 hours PRN Severe Pain (7 - 10)  oxyCODONE    IR 5 milliGRAM(s) Oral every 4 hours PRN Moderate Pain (4 - 6)  pantoprazole    Tablet 40 milliGRAM(s) Oral before breakfast PRN Acid Reflux          VITALS:   T(C): 36.4 (03-12-24 @ 11:00), Max: 37.3 (03-11-24 @ 15:00)  HR: 75 (03-12-24 @ 13:30) (63 - 90)  BP: 134/71 (03-12-24 @ 13:00) (133/70 - 166/74)  RR: 31 (03-12-24 @ 13:30) (12 - 31)  SpO2: 95% (03-12-24 @ 13:30) (93% - 98%)  Wt(kg): --    PHYSICAL EXAM:  GENERAL: NAD, well-groomed, well-developed  HEAD:  Atraumatic, Normocephalic  EYES: EOMI, PERRLA, conjunctiva and sclera clear  ENMT: No tonsillar erythema, exudates, or enlargement; Moist mucous membranes, Good dentition, No lesions  NECK: Supple, No JVD, Normal thyroid  NERVOUS SYSTEM:  Alert & Oriented X3, Good concentration b/l UE weakness right greater the left  CHEST/LUNG: Clear to percussion bilaterally; No rales, rhonchi, wheezing, or rubs  HEART: Regular rate and rhythm; No murmurs, rubs, or gallops  ABDOMEN: Soft, Nontender, Nondistended; Bowel sounds present  EXTREMITIES:  2+ Peripheral Pulses, No clubbing, cyanosis, or edema  LYMPH: No lymphadenopathy noted  SKIN: No rashes or lesions    LABS:        CBC Full  -  ( 11 Mar 2024 22:29 )  WBC Count : 10.53 K/uL  RBC Count : 3.86 M/uL  Hemoglobin : 12.1 g/dL  Hematocrit : 37.2 %  Platelet Count - Automated : 187 K/uL  Mean Cell Volume : 96.4 fl  Mean Cell Hemoglobin : 31.3 pg  Mean Cell Hemoglobin Concentration : 32.5 gm/dL  Auto Neutrophil # : x  Auto Lymphocyte # : x  Auto Monocyte # : x  Auto Eosinophil # : x  Auto Basophil # : x  Auto Neutrophil % : x  Auto Lymphocyte % : x  Auto Monocyte % : x  Auto Eosinophil % : x  Auto Basophil % : x    03-11    139  |  102  |  17  ----------------------------<  124<H>  4.5   |  27  |  0.66    Ca    8.9      11 Mar 2024 22:29  Phos  3.2     03-11  Mg     2.1     03-11    TPro  6.2  /  Alb  3.5  /  TBili  0.2  /  DBili  x   /  AST  25  /  ALT  14  /  AlkPhos  71  03-11    LIVER FUNCTIONS - ( 11 Mar 2024 22:29 )  Alb: 3.5 g/dL / Pro: 6.2 g/dL / ALK PHOS: 71 U/L / ALT: 14 U/L / AST: 25 U/L / GGT: x           PT/INR - ( 11 Mar 2024 05:28 )   PT: 9.6 sec;   INR: 0.91 ratio         PTT - ( 11 Mar 2024 05:28 )  PTT:26.1 sec  Urinalysis Basic - ( 11 Mar 2024 22:29 )    Color: x / Appearance: x / SG: x / pH: x  Gluc: 124 mg/dL / Ketone: x  / Bili: x / Urobili: x   Blood: x / Protein: x / Nitrite: x   Leuk Esterase: x / RBC: x / WBC x   Sq Epi: x / Non Sq Epi: x / Bacteria: x      CAPILLARY BLOOD GLUCOSE          RADIOLOGY & ADDITIONAL TESTS:

## 2024-03-12 NOTE — PROGRESS NOTE ADULT - ASSESSMENT
Patient is a 63y Female PMH HTN, HLD, depression, gastric bypass who presents c/o cervical pain and RUE weakness. Per patient, she had a syncopal fall last night and does not recall the events; had LOC. When she woke up, had severe pain and noticed new onset pain and weakness in the RUE. She endorses severe pain in bilateral shoulders and arms. Denies other numbness/tingling/paresthesias/weakness. Denies bowel/bladder incontinence. Denies fevers/chills. No other complaints at this time. Admitted for new onset neurologic deficits and allodynia concerning for central cord syndrome. Now s/p C3-7 decompression and fusion.     PLAN:  Neuro  -C5-C6 fracture s/p C3-C7 laminectomy and PSF 3/9, remains on c-collar  -Q4hour neuro checks  -Residual R sided weakness  -Pain: Tylenol, Oxy PRN  -Protected sleep    Resp  -Maintain spo2 >92%, on RA    Cards  -TTE grossly normal w/o valvular dysfunction (3/8)  -Phenylephrine gtt off since 1900 3/10  -Atorvastatin    GI  -Diet: Regular   -Protonix  -Multivitamin      -IVL  -voiding spontaneously    Heme  -Holding chem DVT prophylaxis per Ortho   -Mechanical DVT prophylaxis SCDs    ID  -S/p ancef x 24 hrs post-op    Endo-  -IV decadron taper for cord compression    Ortho:  -S/p 3/9 C3-C7 decompression/ fusion  -CT R wrist - negative for scaphoid fracture   -OOB to chair, must remain in collar    Dispo: SICU

## 2024-03-12 NOTE — PROGRESS NOTE ADULT - SUBJECTIVE AND OBJECTIVE BOX
Interval events:  - CT negative for scaphoid fx  - MAP goals done, liberated to q4hr neurochecks  - List for floor in AM  - Protected sleep    SUBJECTIVE/ROS:  [ ] A ten-point review of systems was otherwise negative except as noted.  [ ] Due to altered mental status/intubation, subjective information were not able to be obtained from the patient. History was obtained, to the extent possible, from review of the chart and collateral sources of information.      NEURO  Exam: awake, alert, oriented. Right side weaker than left  Meds: acetaminophen     Tablet .. 650 milliGRAM(s) Oral every 6 hours PRN Mild Pain (1 - 3)  butorphanol Injectable 0.25 milliGRAM(s) IV Push every 6 hours PRN Pruritus  ondansetron Injectable 4 milliGRAM(s) IV Push every 6 hours PRN Nausea  oxyCODONE    IR 10 milliGRAM(s) Oral every 4 hours PRN Severe Pain (7 - 10)  oxyCODONE    IR 5 milliGRAM(s) Oral every 4 hours PRN Moderate Pain (4 - 6)  [x] Adequacy of sedation and pain control has been assessed and adjusted      RESPIRATORY  RR: 24 (03-11-24 @ 23:00) (14 - 40)  SpO2: 95% (03-11-24 @ 23:00) (93% - 97%)  Exam: unlabored, clear to auscultation bilaterally      CARDIOVASCULAR  HR: 74 (03-11-24 @ 23:00) (71 - 94)  BP: 136/73 (03-11-24 @ 23:00) (114/64 - 161/96)  BP(mean): 99 (03-11-24 @ 23:00) (84 - 122)  VBG - ( 10 Mar 2024 17:50 )  pH: 7.46  /  pCO2: 41    /  pO2: 48    / HCO3: 29    / Base Excess: 4.9   /  SaO2: 79.1   Lactate: 1.1      Exam: regular rate and rhythm  Cardiac Rhythm: sinus  Perfusion     [x]Adequate   [ ]Inadequate  Mentation   [x]Normal       [ ]Reduced  Extremities  [x]Warm         [ ]Cool  Volume Status [ ]Hypervolemic [x]Euvolemic [ ]Hypovolemic      GI/NUTRITION  Exam: soft, nontender, nondistended  Diet: regular   Meds: naloxegol 12.5 milliGRAM(s) Oral daily  pantoprazole    Tablet 40 milliGRAM(s) Oral before breakfast PRN Acid Reflux  polyethylene glycol 3350 17 Gram(s) Oral daily  senna 2 Tablet(s) Oral at bedtime      GENITOURINARY  I&O's Detail    03-10 @ 07:01 - 03-11 @ 07:00  --------------------------------------------------------  IN:    IV PiggyBack: 350 mL    Oral Fluid: 1530 mL    Phenylephrine: 43.2 mL    sodium chloride 0.9%: 400 mL    Sodium Chloride 0.9% Bolus: 500 mL  Total IN: 2823.2 mL    OUT:    Accordian (mL): 120 mL    Indwelling Catheter - Urethral (mL): 1555 mL    Voided (mL): 1700 mL  Total OUT: 3375 mL    Total NET: -551.8 mL      03-11 @ 07:01 - 03-12 @ 00:28  --------------------------------------------------------  IN:    Oral Fluid: 620 mL  Total IN: 620 mL    OUT:    Accordian (mL): 45 mL    Voided (mL): 600 mL  Total OUT: 645 mL    Total NET: -25 mL          03-11    139  |  102  |  17  ----------------------------<  124<H>  4.5   |  27  |  0.66    Ca    8.9      11 Mar 2024 22:29  Phos  3.2     03-11  Mg     2.1     03-11    TPro  6.2  /  Alb  3.5  /  TBili  0.2  /  DBili  x   /  AST  25  /  ALT  14  /  AlkPhos  71  03-11    [ ] Rodriguez catheter, indication: N/A  Meds: multivitamin 1 Tablet(s) Oral daily      HEMATOLOGIC  Meds:   [x] VTE Prophylaxis                        12.1   10.53 )-----------( 187      ( 11 Mar 2024 22:29 )             37.2     PT/INR - ( 11 Mar 2024 05:28 )   PT: 9.6 sec;   INR: 0.91 ratio         PTT - ( 11 Mar 2024 05:28 )  PTT:26.1 sec  Transfusion     [ ] PRBC   [ ] Platelets   [ ] FFP   [ ] Cryoprecipitate      INFECTIOUS DISEASES  WBC Count: 10.53 K/uL (03-11 @ 22:29)  WBC Count: 11.25 K/uL (03-11 @ 05:28)      ENDOCRINE  CAPILLARY BLOOD GLUCOSE  Meds: atorvastatin 10 milliGRAM(s) Oral at bedtime  dexAMETHasone     Tablet 3 milliGRAM(s) Oral every 6 hours  dexAMETHasone     Tablet 2 milliGRAM(s) Oral every 6 hours  dexAMETHasone     Tablet   Oral     OTHER MEDICATIONS:  chlorhexidine 2% Cloths 1 Application(s) Topical <User Schedule>  naloxone Injectable 0.1 milliGRAM(s) IV Push every 3 minutes PRN      CODE STATUS: full code

## 2024-03-12 NOTE — PROGRESS NOTE ADULT - ASSESSMENT
Assessment/Plan: Patient is a 63 year old female s/p C3-7 Laminectomy/PSF on 3/9/24, continuing to recover in SICU, improving.     - Pain control: PCA; Do not give Toradol.   - DVT Ppx: Hold in the setting of recent spinal surgery due to increased risk of epidural hematoma; Continue to hold Lovenox, will reassess daily  - HMV drain in place draining sero-sanguinous fluid; Monitor output.   - Steroids: 48 hours IV Decadron followed by PO taper.    - WBAT / OOB with assistance as needed.   - Hard collar at all times.   - Rodriguez.   - PT/OT  - Encourage incentive spirometry.   -Appreciate care per SICU  - Will discuss with Dr. Layton and will advise if plan changes.      For any questions please contact the on call orthopedic surgery team, please do not reach out via teams.  [facility] pager: [phone]  [facility] pager: [phone]  [facility] pager: [phone]

## 2024-03-12 NOTE — PROGRESS NOTE ADULT - SUBJECTIVE AND OBJECTIVE BOX
ORTHOPEDIC PROGRESS NOTE    Overnight events: None    SUBJECTIVE: Pt seen and examined at bedside. Patient is doing well, no acute complaints this AM. Pain is controlled with medication      OBJECTIVE:  Vital Signs Last 24 Hrs  T(C): 36.8 (12 Mar 2024 03:00), Max: 37.3 (11 Mar 2024 15:00)  T(F): 98.2 (12 Mar 2024 03:00), Max: 99.1 (11 Mar 2024 15:00)  HR: 63 (12 Mar 2024 06:00) (63 - 94)  BP: 140/76 (12 Mar 2024 06:00) (131/67 - 161/96)  BP(mean): 103 (12 Mar 2024 06:00) (94 - 122)  RR: 13 (12 Mar 2024 06:00) (12 - 31)  SpO2: 96% (12 Mar 2024 06:00) (93% - 97%)    Parameters below as of 12 Mar 2024 03:00  Patient On (Oxygen Delivery Method): room air          03-10-24 @ 07:01  -  03-11-24 @ 07:00  --------------------------------------------------------  IN: 2823.2 mL / OUT: 3375 mL / NET: -551.8 mL    03-11-24 @ 07:01  -  03-12-24 @ 06:42  --------------------------------------------------------  IN: 620 mL / OUT: 895 mL / NET: -275 mL        Physical Examination:  GEN: NAD, resting quietly  PULM: symmetric chest rise bilaterally, no increased WOB  ABD: nondistended  Motor exam:          Upper extremity       C5 (Shoulder Abd)    C6 (Elbow flex)   C7 (Elbow ext)                      C8                     T1                                                R         3/5                 3/5                      3/5                      4/5                         4/5                                                L          4/5                 4/5                      4/5                      5/5                         5/5                   Lower extremity         L2 (Hip flex)  L3 (knee ext)  L4 (Dorsi flex)  L5 (EHL)  S1 (Plantar flex)                                                                                           R        5/5            5/5             5/5                    5/5          5/5                                                L         5/5            5/5             5/5                   5/5           5/5    Hemovac with serosanguineous fluid in bulb, no leakage.        LABS:                        12.1   10.53 )-----------( 187      ( 11 Mar 2024 22:29 )             37.2       03-11    139  |  102  |  17  ----------------------------<  124<H>  4.5   |  27  |  0.66    Ca    8.9      11 Mar 2024 22:29  Phos  3.2     03-11  Mg     2.1     03-11    TPro  6.2  /  Alb  3.5  /  TBili  0.2  /  DBili  x   /  AST  25  /  ALT  14  /  AlkPhos  71  03-11

## 2024-03-13 ENCOUNTER — TRANSCRIPTION ENCOUNTER (OUTPATIENT)
Age: 64
End: 2024-03-13

## 2024-03-13 PROCEDURE — 99223 1ST HOSP IP/OBS HIGH 75: CPT

## 2024-03-13 RX ORDER — LANOLIN ALCOHOL/MO/W.PET/CERES
3 CREAM (GRAM) TOPICAL AT BEDTIME
Refills: 0 | Status: DISCONTINUED | OUTPATIENT
Start: 2024-03-13 | End: 2024-03-16

## 2024-03-13 RX ORDER — TIZANIDINE 4 MG/1
4 TABLET ORAL EVERY 8 HOURS
Refills: 0 | Status: DISCONTINUED | OUTPATIENT
Start: 2024-03-13 | End: 2024-03-16

## 2024-03-13 RX ORDER — ENOXAPARIN SODIUM 100 MG/ML
40 INJECTION SUBCUTANEOUS EVERY 24 HOURS
Refills: 0 | Status: DISCONTINUED | OUTPATIENT
Start: 2024-03-13 | End: 2024-03-16

## 2024-03-13 RX ORDER — ACETAMINOPHEN 500 MG
1000 TABLET ORAL ONCE
Refills: 0 | Status: COMPLETED | OUTPATIENT
Start: 2024-03-13 | End: 2024-03-13

## 2024-03-13 RX ADMIN — Medication 1000 MILLIGRAM(S): at 13:18

## 2024-03-13 RX ADMIN — TIZANIDINE 4 MILLIGRAM(S): 4 TABLET ORAL at 12:31

## 2024-03-13 RX ADMIN — Medication 2 MILLIGRAM(S): at 17:19

## 2024-03-13 RX ADMIN — CHLORHEXIDINE GLUCONATE 1 APPLICATION(S): 213 SOLUTION TOPICAL at 05:31

## 2024-03-13 RX ADMIN — Medication 3 MILLIGRAM(S): at 00:37

## 2024-03-13 RX ADMIN — OXYCODONE HYDROCHLORIDE 10 MILLIGRAM(S): 5 TABLET ORAL at 10:48

## 2024-03-13 RX ADMIN — POLYETHYLENE GLYCOL 3350 17 GRAM(S): 17 POWDER, FOR SOLUTION ORAL at 11:52

## 2024-03-13 RX ADMIN — Medication 5 MILLIGRAM(S): at 05:31

## 2024-03-13 RX ADMIN — OXYCODONE HYDROCHLORIDE 10 MILLIGRAM(S): 5 TABLET ORAL at 20:11

## 2024-03-13 RX ADMIN — BENZOCAINE AND MENTHOL 1 LOZENGE: 5; 1 LIQUID ORAL at 17:24

## 2024-03-13 RX ADMIN — Medication 2 MILLIGRAM(S): at 11:52

## 2024-03-13 RX ADMIN — Medication 2 MILLIGRAM(S): at 05:30

## 2024-03-13 RX ADMIN — OXYCODONE HYDROCHLORIDE 10 MILLIGRAM(S): 5 TABLET ORAL at 06:32

## 2024-03-13 RX ADMIN — Medication 1 MILLIGRAM(S): at 23:30

## 2024-03-13 RX ADMIN — Medication 3 MILLIGRAM(S): at 21:49

## 2024-03-13 RX ADMIN — OXYCODONE HYDROCHLORIDE 10 MILLIGRAM(S): 5 TABLET ORAL at 09:48

## 2024-03-13 RX ADMIN — Medication 975 MILLIGRAM(S): at 22:50

## 2024-03-13 RX ADMIN — ATORVASTATIN CALCIUM 10 MILLIGRAM(S): 80 TABLET, FILM COATED ORAL at 21:50

## 2024-03-13 RX ADMIN — BENZOCAINE AND MENTHOL 1 LOZENGE: 5; 1 LIQUID ORAL at 20:10

## 2024-03-13 RX ADMIN — SENNA PLUS 2 TABLET(S): 8.6 TABLET ORAL at 21:49

## 2024-03-13 RX ADMIN — Medication 400 MILLIGRAM(S): at 12:31

## 2024-03-13 RX ADMIN — NALOXEGOL OXALATE 12.5 MILLIGRAM(S): 12.5 TABLET, FILM COATED ORAL at 11:52

## 2024-03-13 RX ADMIN — ENOXAPARIN SODIUM 40 MILLIGRAM(S): 100 INJECTION SUBCUTANEOUS at 21:49

## 2024-03-13 RX ADMIN — Medication 975 MILLIGRAM(S): at 06:23

## 2024-03-13 RX ADMIN — OXYCODONE HYDROCHLORIDE 10 MILLIGRAM(S): 5 TABLET ORAL at 05:31

## 2024-03-13 RX ADMIN — Medication 1 TABLET(S): at 11:52

## 2024-03-13 RX ADMIN — SENNA PLUS 2 TABLET(S): 8.6 TABLET ORAL at 05:31

## 2024-03-13 RX ADMIN — Medication 975 MILLIGRAM(S): at 21:50

## 2024-03-13 RX ADMIN — Medication 975 MILLIGRAM(S): at 05:30

## 2024-03-13 RX ADMIN — OXYCODONE HYDROCHLORIDE 10 MILLIGRAM(S): 5 TABLET ORAL at 21:00

## 2024-03-13 NOTE — PROGRESS NOTE ADULT - ASSESSMENT
64 y/o FM s/p C3-7 Decompression, Posterior spinal fusion POD#4, physical therapy, f/u PMR  Concepcion Aviles PA-C  Orthopaedic Surgery  Team pager [phone]  [facility] [phone]  [phone]

## 2024-03-13 NOTE — DISCHARGE NOTE PROVIDER - NSDCCPTREATMENT_GEN_ALL_CORE_FT
PRINCIPAL PROCEDURE  Procedure: Laminectomy with instrumented fusion of cervical spine by posterior approach  Findings and Treatment: C3-C7 Decompression and Fusion

## 2024-03-13 NOTE — DISCHARGE NOTE PROVIDER - NSDCFUADDINST_GEN_ALL_CORE_FT
Continue weight bearing as tolerated ambulation in Cervical collar, Keep surgical dressing clean and dry, have dressing/sutures removed and steri-strips applied post operative day#13 (3/22/2024)if applicable. Follow up with Dr. Layton in his office 3-4 weeks post op. Continue weight bearing as tolerated ambulation in Cervical collar on at all times, Keep surgical dressing clean and dry, have dressing/sutures removed and steri-strips applied post operative day#13 (3/22/2024)if applicable.   Follow up in the office with Dr. Layton within 7-10 days post operatively. (Call office at [phone] to confirm appointment)  Driving: do not drive until cleared by surgeon.   Activity: no bending, lifting and twisting.   Medications/Pain control: do not take NSAIDs(antiinflammatory medications)  Pain medications as per med rec.   GI and bowel regimen as per med rec.

## 2024-03-13 NOTE — DISCHARGE NOTE PROVIDER - NSDCFUADDAPPT_GEN_ALL_CORE_FT
Please follow up with your primary care physician and cardiology regarding your hospitalization within one month of your discharge.

## 2024-03-13 NOTE — CONSULT NOTE ADULT - SUBJECTIVE AND OBJECTIVE BOX
Patient is a 63y old  Female who presents with a chief complaint of Central cord syndrome (13 Mar 2024 07:31)    Admission HPI:  Patient is a 63y Female PMH HTN, HLD, depression, gastric bypass who presents c/o cervical pain and RUE weakness. Per patient, she had a syncopal fall last night and does not recall the events; had LOC. When she woke up, had severe pain and noticed new onset pain and weakness in the RUE. She endorses severe pain in bilateral shoulders and arms. Denies other numbness/tingling/paresthesias/weakness. Denies bowel/bladder incontinence. Denies fevers/chills. No other complaints at this time.    Interval History:  Patient had w/u - imaging:     MR Thoracic Spine No Cont (03.08.24 @ 15:08) >  MR cervical spine: Anterior inferior corner fractures at C5 and C6 are   better seen on the CT of the cervical spine seen from earlier in the same   day. There is associated prominent prevertebral edema from C2 to C5. No   definite evidence of ligamentous injury. No traumatic subluxation.    Advanced multilevel degenerative changes contribute to high-grade   multilevel central canal stenosis as detailed above. Notably there is   severe central canal stenosis and cord compression at C3-C4, C4-C5 and   C5-C6. No cervical cord signal abnormality.    MR thoracic spine: No fracture or acute traumatic malalignment.    MR lumbar spine: Chronic mild compression deformity at L1. Degenerative   changes as described.     CT Cervical Spine No Cont (03.08.24 @ 09:05) >  No acute intracranial  hemorrhage or calvarial fracture.    C5 and C6 vertebral body ACUTE anterior inferior endplate vertebral body   displaced corner fractures. No subluxation.    Multilevel cervical spondylosis and degenerative disease and spinal   stenoses. Severe C3-4, moderate to severe C4-5 and C5-6 and moderate C6-7   central spinal stenoses. A follow-up MRI of cervical spine may prove   helpful for further evaluation.    Patient went to the OR on 3/9 for Laminectomy with instrumented fusion of cervical spine by posterior approach,  C3-C7 Decompression and Fusion  Post- op w functional deficites.     REVIEW OF SYSTEMS: No chest pain, shortness of breath, nausea, vomiting or diarhea; other ROS neg     PAST MEDICAL & SURGICAL HISTORY  History of hypertension    Hypercholesterolemia    Personal History of Gastric Bypass    S/P Cholecystectomy    S/P Hernia Repair    H/O gastric bypass    FUNCTIONAL HISTORY:   Lives w spouse in townhouse w 2 JN  PTA Independent    CURRENT FUNCTIONAL STATUS:  Min A transfers and gait    FAMILY HISTORY   N/C    MEDICATIONS   acetaminophen     Tablet .. 975 milliGRAM(s) Oral every 8 hours  atorvastatin 10 milliGRAM(s) Oral at bedtime  benzocaine/menthol Lozenge 1 Lozenge Oral every 2 hours PRN  butorphanol Injectable 0.25 milliGRAM(s) IV Push every 6 hours PRN  chlorhexidine 2% Cloths 1 Application(s) Topical <User Schedule>  dexAMETHasone     Tablet 2 milliGRAM(s) Oral every 6 hours  dexAMETHasone     Tablet 1 milliGRAM(s) Oral every 6 hours  dexAMETHasone     Tablet   Oral   enalapril 5 milliGRAM(s) Oral daily  melatonin 3 milliGRAM(s) Oral at bedtime  multivitamin 1 Tablet(s) Oral daily  naloxegol 12.5 milliGRAM(s) Oral daily  naloxone Injectable 0.1 milliGRAM(s) IV Push every 3 minutes PRN  ondansetron Injectable 4 milliGRAM(s) IV Push every 6 hours PRN  oxyCODONE    IR 10 milliGRAM(s) Oral every 4 hours PRN  oxyCODONE    IR 5 milliGRAM(s) Oral every 4 hours PRN  pantoprazole    Tablet 40 milliGRAM(s) Oral before breakfast PRN  polyethylene glycol 3350 17 Gram(s) Oral daily  senna 2 Tablet(s) Oral at bedtime  traMADol 50 milliGRAM(s) Oral every 6 hours PRN    ALLERGIES  No Known Allergies    VITALS  T(C): 36.5 (03-13-24 @ 08:08), Max: 37.2 (03-12-24 @ 17:34)  HR: 69 (03-13-24 @ 08:08) (64 - 85)  BP: 136/86 (03-13-24 @ 08:08) (120/75 - 166/74)  RR: 18 (03-13-24 @ 08:08) (14 - 31)  SpO2: 97% (03-13-24 @ 08:08) (95% - 98%)  Wt(kg): --    PHYSICAL EXAM  Constitutional - NAD, Comfortable  HEENT - NCAT, EOMI  Neck - Supple  Chest - No distress, no use of accessory muscles for respiration  Cardiovascular -Well perfused  Abdomen - BS+, Soft, NTND  Extremities - No C/C/E, No calf tenderness   Neurologic Exam -                    Cognitive - Awake, Alert, AAO to self, place, date, year, situation     Communication - Fluent, No dysarthria, no aphasia     Cranial Nerves - CN 2-12 intact     Motor - No focal deficits      Sensory - Intact to LT     Reflexes - DTR Intact, No primitive reflexive  Psychiatric - Mood stable, Affect WNL    RECENT LABS/IMAGING  CBC Full  -  ( 11 Mar 2024 22:29 )  WBC Count : 10.53 K/uL  RBC Count : 3.86 M/uL  Hemoglobin : 12.1 g/dL  Hematocrit : 37.2 %  Platelet Count - Automated : 187 K/uL  Mean Cell Volume : 96.4 fl  Mean Cell Hemoglobin : 31.3 pg  Mean Cell Hemoglobin Concentration : 32.5 gm/dL  Auto Neutrophil # : x  Auto Lymphocyte # : x  Auto Monocyte # : x  Auto Eosinophil # : x  Auto Basophil # : x  Auto Neutrophil % : x  Auto Lymphocyte % : x  Auto Monocyte % : x  Auto Eosinophil % : x  Auto Basophil % : x    03-11    139  |  102  |  17  ----------------------------<  124<H>  4.5   |  27  |  0.66    Ca    8.9      11 Mar 2024 22:29  Phos  3.2     03-11  Mg     2.1     03-11    TPro  6.2  /  Alb  3.5  /  TBili  0.2  /  DBili  x   /  AST  25  /  ALT  14  /  AlkPhos  71  03-11    Urinalysis Basic - ( 11 Mar 2024 22:29 )    Color: x / Appearance: x / SG: x / pH: x  Gluc: 124 mg/dL / Ketone: x  / Bili: x / Urobili: x   Blood: x / Protein: x / Nitrite: x   Leuk Esterase: x / RBC: x / WBC x   Sq Epi: x / Non Sq Epi: x / Bacteria: x    Impression:  62 yo with functional deficits secondary to diagnosis of Cervical Stenosis    Plan:  PT- ROM, Bed Mob, Transfers, Amb w AD and bracing as needed  OT- ADLs, bracing  Prec- Falls, Cardiac  DVT Prophylaxis- SCDs  Skin- Turn q2 h  Dispo-  Patient is a 63y old  Female who presents with a chief complaint of Central cord syndrome (13 Mar 2024 07:31)    Admission HPI:  Patient is a 63y Female PMH HTN, HLD, depression, gastric bypass who presents c/o cervical pain and RUE weakness. Per patient, she had a syncopal fall last night and does not recall the events; had LOC. When she woke up, had severe pain and noticed new onset pain and weakness in the RUE. She endorses severe pain in bilateral shoulders and arms. Denies other numbness/tingling/paresthesias/weakness. Denies bowel/bladder incontinence. Denies fevers/chills. No other complaints at this time.    Interval History:  Patient had w/u - imaging:     MR Thoracic Spine No Cont (03.08.24 @ 15:08) >  MR cervical spine: Anterior inferior corner fractures at C5 and C6 are   better seen on the CT of the cervical spine seen from earlier in the same   day. There is associated prominent prevertebral edema from C2 to C5. No   definite evidence of ligamentous injury. No traumatic subluxation.    Advanced multilevel degenerative changes contribute to high-grade   multilevel central canal stenosis as detailed above. Notably there is   severe central canal stenosis and cord compression at C3-C4, C4-C5 and   C5-C6. No cervical cord signal abnormality.    MR thoracic spine: No fracture or acute traumatic malalignment.    MR lumbar spine: Chronic mild compression deformity at L1. Degenerative   changes as described.     CT Cervical Spine No Cont (03.08.24 @ 09:05) >  No acute intracranial  hemorrhage or calvarial fracture.    C5 and C6 vertebral body ACUTE anterior inferior endplate vertebral body   displaced corner fractures. No subluxation.    Multilevel cervical spondylosis and degenerative disease and spinal   stenoses. Severe C3-4, moderate to severe C4-5 and C5-6 and moderate C6-7   central spinal stenoses. A follow-up MRI of cervical spine may prove   helpful for further evaluation.    Patient went to the OR on 3/9 for Laminectomy with instrumented fusion of cervical spine by posterior approach,  C3-C7 Decompression and Fusion  Post- op w functional deficites.     REVIEW OF SYSTEMS: Arm weakness (improving), neck and hand pain, no B/B incontinence, No chest pain, shortness of breath, nausea, vomiting or diarhea; other ROS neg     PAST MEDICAL & SURGICAL HISTORY  History of hypertension    Hypercholesterolemia    Personal History of Gastric Bypass    S/P Cholecystectomy    S/P Hernia Repair    H/O gastric bypass    FUNCTIONAL HISTORY:   Lives w spouse in townhouse w 2 JN  PTA Independent    CURRENT FUNCTIONAL STATUS:  Min A transfers and gait    FAMILY HISTORY   N/C    MEDICATIONS   acetaminophen     Tablet .. 975 milliGRAM(s) Oral every 8 hours  atorvastatin 10 milliGRAM(s) Oral at bedtime  benzocaine/menthol Lozenge 1 Lozenge Oral every 2 hours PRN  butorphanol Injectable 0.25 milliGRAM(s) IV Push every 6 hours PRN  chlorhexidine 2% Cloths 1 Application(s) Topical <User Schedule>  dexAMETHasone     Tablet 2 milliGRAM(s) Oral every 6 hours  dexAMETHasone     Tablet 1 milliGRAM(s) Oral every 6 hours  dexAMETHasone     Tablet   Oral   enalapril 5 milliGRAM(s) Oral daily  melatonin 3 milliGRAM(s) Oral at bedtime  multivitamin 1 Tablet(s) Oral daily  naloxegol 12.5 milliGRAM(s) Oral daily  naloxone Injectable 0.1 milliGRAM(s) IV Push every 3 minutes PRN  ondansetron Injectable 4 milliGRAM(s) IV Push every 6 hours PRN  oxyCODONE    IR 10 milliGRAM(s) Oral every 4 hours PRN  oxyCODONE    IR 5 milliGRAM(s) Oral every 4 hours PRN  pantoprazole    Tablet 40 milliGRAM(s) Oral before breakfast PRN  polyethylene glycol 3350 17 Gram(s) Oral daily  senna 2 Tablet(s) Oral at bedtime  traMADol 50 milliGRAM(s) Oral every 6 hours PRN    ALLERGIES  No Known Allergies    VITALS  T(C): 36.5 (03-13-24 @ 08:08), Max: 37.2 (03-12-24 @ 17:34)  HR: 69 (03-13-24 @ 08:08) (64 - 85)  BP: 136/86 (03-13-24 @ 08:08) (120/75 - 166/74)  RR: 18 (03-13-24 @ 08:08) (14 - 31)  SpO2: 97% (03-13-24 @ 08:08) (95% - 98%)  Wt(kg): --    PHYSICAL EXAM  Constitutional - NAD, Comfortable  HEENT - NCAT, EOMI  Neck - Collar in place  Chest - No distress, no use of accessory muscles for respiration  Cardiovascular -Well perfused  Abdomen - BS+, Soft, NTND  Extremities - No C/C/E, No calf tenderness   Neurologic Exam -                  Alert   Motor - RUE - EF/EE/WE/FF/F ABD - 4/5.   LUE - EF/EE/WE/FF/F ABD - 4+/5; bl LEs 5/5  Sensation dec bl distal hands  Psychiatric - Mood stable, Affect WNL    RECENT LABS/IMAGING  CBC Full  -  ( 11 Mar 2024 22:29 )  WBC Count : 10.53 K/uL  RBC Count : 3.86 M/uL  Hemoglobin : 12.1 g/dL  Hematocrit : 37.2 %  Platelet Count - Automated : 187 K/uL  Mean Cell Volume : 96.4 fl  Mean Cell Hemoglobin : 31.3 pg  Mean Cell Hemoglobin Concentration : 32.5 gm/dL  Auto Neutrophil # : x  Auto Lymphocyte # : x  Auto Monocyte # : x  Auto Eosinophil # : x  Auto Basophil # : x  Auto Neutrophil % : x  Auto Lymphocyte % : x  Auto Monocyte % : x  Auto Eosinophil % : x  Auto Basophil % : x    03-11    139  |  102  |  17  ----------------------------<  124<H>  4.5   |  27  |  0.66    Ca    8.9      11 Mar 2024 22:29  Phos  3.2     03-11  Mg     2.1     03-11    TPro  6.2  /  Alb  3.5  /  TBili  0.2  /  DBili  x   /  AST  25  /  ALT  14  /  AlkPhos  71  03-11    Urinalysis Basic - ( 11 Mar 2024 22:29 )    Color: x / Appearance: x / SG: x / pH: x  Gluc: 124 mg/dL / Ketone: x  / Bili: x / Urobili: x   Blood: x / Protein: x / Nitrite: x   Leuk Esterase: x / RBC: x / WBC x   Sq Epi: x / Non Sq Epi: x / Bacteria: x    Impression:  64 yo with functional deficits secondary to diagnosis of Cervical Stenosis/Central Cord Syndrome    Plan:  PT- ROM, Bed Mob, Transfers, Amb w AD and bracing as needed  OT- ADLs, bracing  Prec- Falls, Cardiac  DVT Prophylaxis- SCDs  Pain- Consider Gabapentin 300 QHS  Skin- Turn q2 h  Dispo- Acute Rehab- patient requires active and ongoing therapeutic interventions of multiple disciplines and can tolerate 3 hours of therapies x 4wks. Can actively participate and benefit from  an intensive rehabilitation program. Requires supervision by a rehabilitation physician and a coordinated interdisciplinary approach to providing rehabilitation.

## 2024-03-13 NOTE — PROGRESS NOTE ADULT - SUBJECTIVE AND OBJECTIVE BOX
63-year-old female history of gastric bypass, hypertension, hyperlipidemia, depression, social EtOH use, transferred from [place] for C5-C6 vertebral body fracture.  Yesterday patient got up to go to the kitchen, felt lightheaded, had a syncopal episode and fell on the floor and laid on the floor all night because of right arm and right leg weakness.  Eventually was able to make a call and presented to the ED at [place].  Continues to complain of right upper lower extremity weakness, and bilateral shoulder pain.  Denies chest pain or shortness of breath, nausea vomiting, also complains of abdominal upper back pain. Patient was transferred to [facility] for further treatment. Patient is s/p a C3-7 decompression. She tolerated the procedure well. Patient has started working with physical therapy. Pain has been well managed.        MEDICATIONS  (STANDING):  acetaminophen     Tablet .. 975 milliGRAM(s) Oral every 8 hours  atorvastatin 10 milliGRAM(s) Oral at bedtime  chlorhexidine 2% Cloths 1 Application(s) Topical <User Schedule>  dexAMETHasone     Tablet 2 milliGRAM(s) Oral every 6 hours  dexAMETHasone     Tablet 1 milliGRAM(s) Oral every 6 hours  dexAMETHasone     Tablet   Oral   enalapril 5 milliGRAM(s) Oral daily  melatonin 3 milliGRAM(s) Oral at bedtime  multivitamin 1 Tablet(s) Oral daily  naloxegol 12.5 milliGRAM(s) Oral daily  polyethylene glycol 3350 17 Gram(s) Oral daily  senna 2 Tablet(s) Oral at bedtime    MEDICATIONS  (PRN):  benzocaine/menthol Lozenge 1 Lozenge Oral every 2 hours PRN Sore Throat  butorphanol Injectable 0.25 milliGRAM(s) IV Push every 6 hours PRN Pruritus  naloxone Injectable 0.1 milliGRAM(s) IV Push every 3 minutes PRN For ANY of the following changes in patient status:  A. RR LESS THAN 10 breaths per minute, B. Oxygen saturation LESS THAN 90%, C. Sedation score of 6  ondansetron Injectable 4 milliGRAM(s) IV Push every 6 hours PRN Nausea  oxyCODONE    IR 10 milliGRAM(s) Oral every 4 hours PRN Severe Pain (7 - 10)  oxyCODONE    IR 5 milliGRAM(s) Oral every 4 hours PRN Moderate Pain (4 - 6)  pantoprazole    Tablet 40 milliGRAM(s) Oral before breakfast PRN Acid Reflux  tiZANidine 4 milliGRAM(s) Oral every 8 hours PRN Muscle Spasm  traMADol 50 milliGRAM(s) Oral every 6 hours PRN Mild Pain (1 - 3)          VITALS:   T(C): 36.7 (03-13-24 @ 13:10), Max: 37.2 (03-12-24 @ 17:34)  HR: 70 (03-13-24 @ 13:10) (64 - 76)  BP: 101/68 (03-13-24 @ 13:10) (101/68 - 136/86)  RR: 18 (03-13-24 @ 13:10) (18 - 19)  SpO2: 96% (03-13-24 @ 13:10) (95% - 97%)  Wt(kg): --    PHYSICAL EXAM:  GENERAL: NAD, well-groomed, well-developed  HEAD:  Atraumatic, Normocephalic  EYES: EOMI, PERRLA, conjunctiva and sclera clear  ENMT: No tonsillar erythema, exudates, or enlargement; Moist mucous membranes, Good dentition, No lesions  NECK: Supple, No JVD, Normal thyroid  NERVOUS SYSTEM:  Alert & Oriented X3, Good concentration b/l UE weakness right greater the left  CHEST/LUNG: Clear to percussion bilaterally; No rales, rhonchi, wheezing, or rubs  HEART: Regular rate and rhythm; No murmurs, rubs, or gallops  ABDOMEN: Soft, Nontender, Nondistended; Bowel sounds present  EXTREMITIES:  2+ Peripheral Pulses, No clubbing, cyanosis, or edema  LYMPH: No lymphadenopathy noted  SKIN: No rashes or lesions    LABS:        CBC Full  -  ( 11 Mar 2024 22:29 )  WBC Count : 10.53 K/uL  RBC Count : 3.86 M/uL  Hemoglobin : 12.1 g/dL  Hematocrit : 37.2 %  Platelet Count - Automated : 187 K/uL  Mean Cell Volume : 96.4 fl  Mean Cell Hemoglobin : 31.3 pg  Mean Cell Hemoglobin Concentration : 32.5 gm/dL  Auto Neutrophil # : x  Auto Lymphocyte # : x  Auto Monocyte # : x  Auto Eosinophil # : x  Auto Basophil # : x  Auto Neutrophil % : x  Auto Lymphocyte % : x  Auto Monocyte % : x  Auto Eosinophil % : x  Auto Basophil % : x    03-11    139  |  102  |  17  ----------------------------<  124<H>  4.5   |  27  |  0.66    Ca    8.9      11 Mar 2024 22:29  Phos  3.2     03-11  Mg     2.1     03-11    TPro  6.2  /  Alb  3.5  /  TBili  0.2  /  DBili  x   /  AST  25  /  ALT  14  /  AlkPhos  71  03-11    LIVER FUNCTIONS - ( 11 Mar 2024 22:29 )  Alb: 3.5 g/dL / Pro: 6.2 g/dL / ALK PHOS: 71 U/L / ALT: 14 U/L / AST: 25 U/L / GGT: x             Urinalysis Basic - ( 11 Mar 2024 22:29 )    Color: x / Appearance: x / SG: x / pH: x  Gluc: 124 mg/dL / Ketone: x  / Bili: x / Urobili: x   Blood: x / Protein: x / Nitrite: x   Leuk Esterase: x / RBC: x / WBC x   Sq Epi: x / Non Sq Epi: x / Bacteria: x      CAPILLARY BLOOD GLUCOSE          RADIOLOGY & ADDITIONAL TESTS:

## 2024-03-13 NOTE — DISCHARGE NOTE PROVIDER - HOSPITAL COURSE
History of Present Illness:   Patient is a 63y Female PMH HTN, HLD, depression, gastric bypass who presents c/o cervical pain and RUE weakness. Per patient, she had a syncopal fall last night and does not recall the events; had LOC. When she woke up, had severe pain and noticed new onset pain and weakness in the RUE. She endorses severe pain in bilateral shoulders and arms. Denies other numbness/tingling/paresthesias/weakness. Denies bowel/bladder incontinence. Denies fevers/chills. No other complaints at this time.    PAST MEDICAL & SURGICAL HISTORY:  Depression  History of hypertension  Hypercholesterolemia  S/P Cholecystectomy  S/P Hernia Repair  H/O gastric bypass    HOSPITAL COURSE:  62 y/o FM underwent C3-C7 decompression, posterior spinal fusion on 3/9/2024 with Dr. Layton.  Patient tolerated procedure well. Patient was placed in cervical collar post op. Patient was evaluated postoperatively by physical and occupational therapists for weight bearing as tolerated ambulation in Cervical collar, and advised that patient would benefit from admission to rehab facility.  Patient advised to keep surgical incision/dressing clean and dry, and have dressing/sutures  removed and steri strips applied post op day #14(if applicable).  Patient further advised to follow up with Dr. Layton in his office 3-4 weeks post op.     History of Present Illness:   Ms. Dumont is a 63 year old woman with a PMhx of RYGB, hypertension, hyperlipidemia, depression, social EtOH use, who presented after syncope and fall. She lost consciousness without warning or without aura. This has never happened in the past. She woke up some time later (unsure how long) around 9pm but felt too weak to steady herself or get up. she lay on the carpeted floor from 9pm->7:30am before she regained strength to take herself downstairs to call for help. She reports bilateral upper extremity pain, mostly located in the shoulders, R thumb pain, upper neck pain. She has new onset bilateral upper extremity weakness, R>L She has remained A&O since the event. Workup demonstrated c5-6 cervical fractures, no other injuries.     PAST MEDICAL HISTORY:  History of hypertension   Hypercholesterolemia     PAST SURGICAL HISTORY:  H/O gastric bypass   S/P Cholecystectomy   S/P Hernia Repair ventral hernia.      HOSPITAL COURSE:  Patient admitted on 3/8/24 for surgical management of a cervical spine injury following syncopal fall. CT of the cervical spine completed revealing acute anterior inferior endplate vertebral body fractures C5 and C6. MR cervical spine confirmed central cord syndrome with C3-6 severe canal stenosis. Following medical and cardiology clearance and optimization, patient taken to the OR and underwent C3-C7 decompression, posterior spinal fusion with muscle flap reconstruction on 3/9/2024 with Dr. Layton.   Plastic surgery consulted intraop to evaluate patient for muscle flap reconstruction of posterior spine wound given wide exposure of spine structures, need for bilateral multilevel hardware placement, use of bone graft requiring healthy vascularized muscle flap coverage of exposed vital structures and extensive foreign body.  Patient tolerated procedure well. Patient was placed in hard cervical collar post op. Drain placed intraoperatively by plastics team, outputs monitored q shift. Drain pulled on 3/14/24 successfully.     Patient transferred to the SICU post operatively for q1 neuro checks with eventual downgrade to the floor on 3/13/24.   Patient placed on PCA for acute pain control, which was discontinued on 3/10/24 with transition to PO analgesics.   CT of the wrist completed due to wrist pain to evaluate for scaphoid fx: negative for acute fx.     Patient was evaluated postoperatively by physical and occupational therapists for weight bearing as tolerated ambulation in Cervical collar, and advised that patient would benefit from admission to rehab facility.   Physical medicine and rehab team evaluated and agreed with the recommendations.     Discharge and Orthopedic Care instructions were delineated in the Discharge Plan and reviewed with the patient. All medications were delineated in the medication reconciliation tool and key points were reviewed with the patient. They were deemed stable from an Orthopedic & medical standpoint for discharge on 3/15/24 with no new neuro deficits.

## 2024-03-13 NOTE — DISCHARGE NOTE PROVIDER - NSDCMRMEDTOKEN_GEN_ALL_CORE_FT
atorvastatin:   atorvastatin 10 mg oral tablet: 1 tab(s) orally once a day  B12: 1000 mcg PO daily  biotin 10,000 mcg oral capsule: 1 cap(s) orally once a day  Enalapril:   enalapril 5 mg oral tablet: 1 tab(s) orally once a day  pantoprazole 40 mg oral delayed release tablet: 1 tab(s) orally once a day as needed for  indigestion   acetaminophen 325 mg oral tablet: 3 tab(s) orally every 8 hours  atorvastatin 10 mg oral tablet: 1 tab(s) orally once a day  B12: 1000 mcg PO daily  biotin 10,000 mcg oral capsule: 1 cap(s) orally once a day  enalapril 5 mg oral tablet: 1 tab(s) orally once a day  enoxaparin: 40 milligram(s) subcutaneous once a day x 6 weeks post op for DVT ppx  lidocaine 4% topical film: Apply topically to affected area once a day R shoulder pain  lidocaine 4% topical film: Apply topically to affected area once a day L shoulder pain  melatonin 3 mg oral tablet: 1 tab(s) orally once a day (at bedtime)  Multiple Vitamins oral tablet: 1 tab(s) orally once a day  naloxegol 12.5 mg oral tablet: 1 tab(s) orally once a day  Narcan 4 mg/0.1 mL nasal spray: 4 milligram(s) intranasally every 2 to 3 minutes alternating nostrils as needed for overdose  oxyCODONE 10 mg oral tablet: 1 tab(s) orally every 4 hours As needed Severe Pain (7 - 10)  oxyCODONE 5 mg oral tablet: 1 tab(s) orally every 4 hours As needed Moderate Pain (4 - 6)  pantoprazole 40 mg oral delayed release tablet: 1 tab(s) orally once a day as needed for  indigestion  polyethylene glycol 3350 oral powder for reconstitution: 17 gram(s) orally once a day  senna leaf extract oral tablet: 2 tab(s) orally once a day (at bedtime)  tiZANidine 4 mg oral tablet: 1 tab(s) orally every 8 hours As needed Muscle Spasm  traMADol 50 mg oral tablet: 1 tab(s) orally every 6 hours As needed Mild Pain (1 - 3)

## 2024-03-13 NOTE — PROGRESS NOTE ADULT - SUBJECTIVE AND OBJECTIVE BOX
62 y/o FM s/p C3-7 Decompression, Posterior spinal fusion POD#4  Patient comfortable  No complaints    T(C): 36.6 (03-13-24 @ 05:04), Max: 37.2 (03-12-24 @ 17:34)  HR: 64 (03-13-24 @ 05:04) (64 - 85)  BP: 134/84 (03-13-24 @ 05:04) (120/75 - 166/74)  RR: 18 (03-13-24 @ 05:04) (13 - 31)  SpO2: 95% (03-13-24 @ 05:04) (95% - 98%)    PHYSICAL EXAM:  NAD, Alert  Neck: Dressing C/D/I; HMV 60/110cc,  sensation grossly intact (+) Distal Pulses; No Calf tenderness B/L, PAS   Motor exam:          Upper extremity       C5 (Shoulder Abd)    C6 (Elbow flex)   C7 (Elbow ext)      C8 (Finger flex)       T1 (finger abd)                                               R         2/5                 2/5                       2/5                      3/5                         3/5                                                L          4/5                 4/5                      4/5                      5/5                         5/5                   Lower extremity         L2 (Hip flex)  L3 (knee ext)  L4 (Dorsi flex)  L5 (EHL)  S1 (Plantar flex)                                                                                           R        4/5            4/5             5/5                    5/5          5/5                                                L         5/5            5/5             5/5                   5/5           5/5  LABS:                      12.1   10.53 )-----------( 187      ( 11 Mar 2024 22:29 )             37.2   03-11  139  |  102  |  17  ----------------------------<  124<H>  4.5   |  27  |  0.66    Ca    8.9      11 Mar 2024 22:29  Phos  3.2     03-11  Mg     2.1     03-11  TPro  6.2  /  Alb  3.5  /  TBili  0.2  /  DBili  x   /  AST  25  /  ALT  14  /  AlkPhos  71  03-11

## 2024-03-13 NOTE — CONSULT NOTE ADULT - CONSULT REASON
Evaluate Rehabilitation Needs
Urgent Preop Clearance
C5-6 fx
Syncope with fall
intra op spine closure
Possible Central Cord Syndrome
pre op eval

## 2024-03-13 NOTE — DISCHARGE NOTE PROVIDER - CARE PROVIDER_API CALL
Pasha Layton  Orthopaedic Surgery  [address]  [place] [address]  Phone: [phone]  Fax: [phone]  Follow Up Time:    Pasha Layton  Orthopaedic Surgery  [address]  [place] [address]  Phone: [phone]  Fax: [phone]  Follow Up Time: 2 weeks

## 2024-03-13 NOTE — DISCHARGE NOTE PROVIDER - NSDCCPCAREPLAN_GEN_ALL_CORE_FT
PRINCIPAL DISCHARGE DIAGNOSIS  Diagnosis: Cervical spine fracture  Assessment and Plan of Treatment:       SECONDARY DISCHARGE DIAGNOSES  Diagnosis: Stenosis of cervical spine with myelopathy  Assessment and Plan of Treatment:

## 2024-03-13 NOTE — CONSULT NOTE ADULT - CONSULT REQUESTED DATE/TIME
08-Mar-2024 14:46
08-Mar-2024 13:22
08-Mar-2024 20:45
09-Mar-2024 17:29
13-Mar-2024 10:55
08-Mar-2024 22:41
08-Mar-2024 18:54

## 2024-03-14 RX ORDER — LIDOCAINE 4 G/100G
1 CREAM TOPICAL DAILY
Refills: 0 | Status: DISCONTINUED | OUTPATIENT
Start: 2024-03-14 | End: 2024-03-16

## 2024-03-14 RX ADMIN — Medication 1 MILLIGRAM(S): at 17:24

## 2024-03-14 RX ADMIN — ENOXAPARIN SODIUM 40 MILLIGRAM(S): 100 INJECTION SUBCUTANEOUS at 19:37

## 2024-03-14 RX ADMIN — Medication 5 MILLIGRAM(S): at 04:58

## 2024-03-14 RX ADMIN — Medication 975 MILLIGRAM(S): at 04:59

## 2024-03-14 RX ADMIN — LIDOCAINE 1 PATCH: 4 CREAM TOPICAL at 19:30

## 2024-03-14 RX ADMIN — OXYCODONE HYDROCHLORIDE 10 MILLIGRAM(S): 5 TABLET ORAL at 10:36

## 2024-03-14 RX ADMIN — ATORVASTATIN CALCIUM 10 MILLIGRAM(S): 80 TABLET, FILM COATED ORAL at 21:22

## 2024-03-14 RX ADMIN — Medication 1 MILLIGRAM(S): at 04:58

## 2024-03-14 RX ADMIN — OXYCODONE HYDROCHLORIDE 10 MILLIGRAM(S): 5 TABLET ORAL at 23:35

## 2024-03-14 RX ADMIN — SENNA PLUS 2 TABLET(S): 8.6 TABLET ORAL at 21:22

## 2024-03-14 RX ADMIN — OXYCODONE HYDROCHLORIDE 10 MILLIGRAM(S): 5 TABLET ORAL at 19:35

## 2024-03-14 RX ADMIN — LIDOCAINE 1 PATCH: 4 CREAM TOPICAL at 14:58

## 2024-03-14 RX ADMIN — TIZANIDINE 4 MILLIGRAM(S): 4 TABLET ORAL at 04:58

## 2024-03-14 RX ADMIN — OXYCODONE HYDROCHLORIDE 10 MILLIGRAM(S): 5 TABLET ORAL at 21:22

## 2024-03-14 RX ADMIN — TIZANIDINE 4 MILLIGRAM(S): 4 TABLET ORAL at 21:22

## 2024-03-14 RX ADMIN — TIZANIDINE 4 MILLIGRAM(S): 4 TABLET ORAL at 13:00

## 2024-03-14 RX ADMIN — Medication 975 MILLIGRAM(S): at 21:21

## 2024-03-14 RX ADMIN — LIDOCAINE 1 PATCH: 4 CREAM TOPICAL at 14:59

## 2024-03-14 RX ADMIN — BENZOCAINE AND MENTHOL 1 LOZENGE: 5; 1 LIQUID ORAL at 21:22

## 2024-03-14 RX ADMIN — OXYCODONE HYDROCHLORIDE 10 MILLIGRAM(S): 5 TABLET ORAL at 16:00

## 2024-03-14 RX ADMIN — Medication 975 MILLIGRAM(S): at 05:30

## 2024-03-14 RX ADMIN — OXYCODONE HYDROCHLORIDE 10 MILLIGRAM(S): 5 TABLET ORAL at 15:00

## 2024-03-14 RX ADMIN — Medication 3 MILLIGRAM(S): at 21:22

## 2024-03-14 RX ADMIN — CHLORHEXIDINE GLUCONATE 1 APPLICATION(S): 213 SOLUTION TOPICAL at 04:59

## 2024-03-14 RX ADMIN — OXYCODONE HYDROCHLORIDE 10 MILLIGRAM(S): 5 TABLET ORAL at 09:36

## 2024-03-14 NOTE — PROGRESS NOTE ADULT - SUBJECTIVE AND OBJECTIVE BOX
Patient resting c/o b/l shoulder pain.    No chest pain, SOB, N/V.  Last BM 2 days ago.   +Flatus.      T(C): 36.6 (03-14-24 @ 05:00), Max: 36.7 (03-13-24 @ 13:10)  HR: 68 (03-14-24 @ 05:00) (63 - 75)  BP: 112/70 (03-14-24 @ 05:00) (101/68 - 136/86)  RR: 18 (03-14-24 @ 05:00) (18 - 18)  SpO2: 97% (03-14-24 @ 05:00) (95% - 97%)  HMV:10/20    Exam:  Gen: Alert and Oriented, No Acute Distress, Sitting in recliner at bedside  HEENT: Hard C-collar in place, post neck Aquacel C/D/I  Ext: Dull sensation grossly intact  Motor:          Upper extremity       C5 (Shoulder Abd)    C6 (Elbow flex)   C7 (Elbow ext)                      C8                     T1                                                R         3/5                 3/5                      3/5                      4/5                         4/5                                                L          4/5                 4/5                      4/5                      5/5                         5/5                   Lower extremity         L2 (Hip flex)  L3 (knee ext)  L4 (Dorsi flex)  L5 (EHL)  S1 (Plantar flex)                                                                                           R        5/5            5/5             5/5                    5/5          5/5                                                L         5/5            5/5             5/5                   5/5           5/5

## 2024-03-14 NOTE — PROGRESS NOTE ADULT - SUBJECTIVE AND OBJECTIVE BOX
63-year-old female history of gastric bypass, hypertension, hyperlipidemia, depression, social EtOH use, transferred from [place] for C5-C6 vertebral body fracture.  Yesterday patient got up to go to the kitchen, felt lightheaded, had a syncopal episode and fell on the floor and laid on the floor all night because of right arm and right leg weakness.  Eventually was able to make a call and presented to the ED at [place].  Continues to complain of right upper lower extremity weakness, and bilateral shoulder pain.  Denies chest pain or shortness of breath, nausea vomiting, also complains of abdominal upper back pain. Patient was transferred to [facility] for further treatment. Patient is s/p a C3-7 decompression. She tolerated the procedure well. Patient has started working with physical therapy. Pain has been well managed.      MEDICATIONS  (STANDING):  acetaminophen     Tablet .. 975 milliGRAM(s) Oral every 8 hours  atorvastatin 10 milliGRAM(s) Oral at bedtime  chlorhexidine 2% Cloths 1 Application(s) Topical <User Schedule>  dexAMETHasone     Tablet 1 milliGRAM(s) Oral every 6 hours  dexAMETHasone     Tablet   Oral   enalapril 5 milliGRAM(s) Oral daily  enoxaparin Injectable 40 milliGRAM(s) SubCutaneous every 24 hours  melatonin 3 milliGRAM(s) Oral at bedtime  multivitamin 1 Tablet(s) Oral daily  naloxegol 12.5 milliGRAM(s) Oral daily  polyethylene glycol 3350 17 Gram(s) Oral daily  senna 2 Tablet(s) Oral at bedtime    MEDICATIONS  (PRN):  benzocaine/menthol Lozenge 1 Lozenge Oral every 2 hours PRN Sore Throat  butorphanol Injectable 0.25 milliGRAM(s) IV Push every 6 hours PRN Pruritus  naloxone Injectable 0.1 milliGRAM(s) IV Push every 3 minutes PRN For ANY of the following changes in patient status:  A. RR LESS THAN 10 breaths per minute, B. Oxygen saturation LESS THAN 90%, C. Sedation score of 6  ondansetron Injectable 4 milliGRAM(s) IV Push every 6 hours PRN Nausea  oxyCODONE    IR 10 milliGRAM(s) Oral every 4 hours PRN Severe Pain (7 - 10)  oxyCODONE    IR 5 milliGRAM(s) Oral every 4 hours PRN Moderate Pain (4 - 6)  pantoprazole    Tablet 40 milliGRAM(s) Oral before breakfast PRN Acid Reflux  tiZANidine 4 milliGRAM(s) Oral every 8 hours PRN Muscle Spasm  traMADol 50 milliGRAM(s) Oral every 6 hours PRN Mild Pain (1 - 3)          VITALS:   T(C): 36.5 (03-14-24 @ 11:45), Max: 36.7 (03-13-24 @ 22:28)  HR: 74 (03-14-24 @ 11:45) (63 - 75)  BP: 125/84 (03-14-24 @ 11:45) (107/67 - 128/78)  RR: 18 (03-14-24 @ 11:45) (18 - 18)  SpO2: 98% (03-14-24 @ 11:45) (95% - 98%)  Wt(kg): --    PHYSICAL EXAM:  GENERAL: NAD, well-groomed, well-developed  HEAD:  Atraumatic, Normocephalic  EYES: EOMI, PERRLA, conjunctiva and sclera clear  ENMT: No tonsillar erythema, exudates, or enlargement; Moist mucous membranes, Good dentition, No lesions  NECK: Supple, No JVD, Normal thyroid  NERVOUS SYSTEM:  Alert & Oriented X3, Good concentration b/l UE weakness right greater the left  CHEST/LUNG: Clear to percussion bilaterally; No rales, rhonchi, wheezing, or rubs  HEART: Regular rate and rhythm; No murmurs, rubs, or gallops  ABDOMEN: Soft, Nontender, Nondistended; Bowel sounds present  EXTREMITIES:  2+ Peripheral Pulses, No clubbing, cyanosis, or edema  LYMPH: No lymphadenopathy noted  SKIN: No rashes or lesions    LABS:                      CAPILLARY BLOOD GLUCOSE          RADIOLOGY & ADDITIONAL TESTS:

## 2024-03-14 NOTE — PROGRESS NOTE ADULT - ASSESSMENT
A/P: 64 y/o F POD#5 s/p C3-C7 Decompression/PSF      DVT ppx- Ambulation as tolerated, IPC while in bed  C- Collar  PT  OT  Pain management prn  Gi ppx  Possibly DC HMV  Discharge planning to [facility]  Orthopedic Surgery  [phone]

## 2024-03-14 NOTE — CHART NOTE - NSCHARTNOTEFT_GEN_A_CORE
As per Dr. Layton, HMV was d/c'd, without incident, tip intact.  Concepcion Aviles PA-C  Orthopaedic Surgery  Team pager [phone]  [facility] [phone]  [phone]

## 2024-03-15 RX ORDER — POLYETHYLENE GLYCOL 3350 17 G/17G
17 POWDER, FOR SOLUTION ORAL
Qty: 0 | Refills: 0 | DISCHARGE
Start: 2024-03-15

## 2024-03-15 RX ORDER — LIDOCAINE 4 G/100G
1 CREAM TOPICAL
Qty: 0 | Refills: 0 | DISCHARGE
Start: 2024-03-15

## 2024-03-15 RX ORDER — OXYCODONE HYDROCHLORIDE 5 MG/1
1 TABLET ORAL
Qty: 0 | Refills: 0 | DISCHARGE
Start: 2024-03-15

## 2024-03-15 RX ORDER — ENOXAPARIN SODIUM 100 MG/ML
40 INJECTION SUBCUTANEOUS
Qty: 0 | Refills: 0 | DISCHARGE
Start: 2024-03-15

## 2024-03-15 RX ORDER — SENNA PLUS 8.6 MG/1
2 TABLET ORAL
Qty: 0 | Refills: 0 | DISCHARGE
Start: 2024-03-15

## 2024-03-15 RX ORDER — NALOXEGOL OXALATE 12.5 MG/1
1 TABLET, FILM COATED ORAL
Qty: 0 | Refills: 0 | DISCHARGE
Start: 2024-03-15

## 2024-03-15 RX ORDER — ACETAMINOPHEN 500 MG
3 TABLET ORAL
Qty: 0 | Refills: 0 | DISCHARGE
Start: 2024-03-15

## 2024-03-15 RX ORDER — TRAMADOL HYDROCHLORIDE 50 MG/1
1 TABLET ORAL
Qty: 0 | Refills: 0 | DISCHARGE
Start: 2024-03-15

## 2024-03-15 RX ORDER — LANOLIN ALCOHOL/MO/W.PET/CERES
1 CREAM (GRAM) TOPICAL
Qty: 0 | Refills: 0 | DISCHARGE
Start: 2024-03-15

## 2024-03-15 RX ORDER — TIZANIDINE 4 MG/1
1 TABLET ORAL
Qty: 0 | Refills: 0 | DISCHARGE
Start: 2024-03-15

## 2024-03-15 RX ORDER — ATORVASTATIN CALCIUM 80 MG/1
0 TABLET, FILM COATED ORAL
Refills: 0 | DISCHARGE

## 2024-03-15 RX ADMIN — ATORVASTATIN CALCIUM 10 MILLIGRAM(S): 80 TABLET, FILM COATED ORAL at 22:35

## 2024-03-15 RX ADMIN — TIZANIDINE 4 MILLIGRAM(S): 4 TABLET ORAL at 13:22

## 2024-03-15 RX ADMIN — OXYCODONE HYDROCHLORIDE 10 MILLIGRAM(S): 5 TABLET ORAL at 23:04

## 2024-03-15 RX ADMIN — Medication 975 MILLIGRAM(S): at 23:04

## 2024-03-15 RX ADMIN — OXYCODONE HYDROCHLORIDE 10 MILLIGRAM(S): 5 TABLET ORAL at 19:10

## 2024-03-15 RX ADMIN — LIDOCAINE 1 PATCH: 4 CREAM TOPICAL at 03:20

## 2024-03-15 RX ADMIN — LIDOCAINE 1 PATCH: 4 CREAM TOPICAL at 11:03

## 2024-03-15 RX ADMIN — TRAMADOL HYDROCHLORIDE 50 MILLIGRAM(S): 50 TABLET ORAL at 19:49

## 2024-03-15 RX ADMIN — BENZOCAINE AND MENTHOL 1 LOZENGE: 5; 1 LIQUID ORAL at 05:22

## 2024-03-15 RX ADMIN — TIZANIDINE 4 MILLIGRAM(S): 4 TABLET ORAL at 05:21

## 2024-03-15 RX ADMIN — Medication 975 MILLIGRAM(S): at 05:21

## 2024-03-15 RX ADMIN — Medication 975 MILLIGRAM(S): at 13:19

## 2024-03-15 RX ADMIN — OXYCODONE HYDROCHLORIDE 10 MILLIGRAM(S): 5 TABLET ORAL at 00:35

## 2024-03-15 RX ADMIN — Medication 1 TABLET(S): at 11:04

## 2024-03-15 RX ADMIN — POLYETHYLENE GLYCOL 3350 17 GRAM(S): 17 POWDER, FOR SOLUTION ORAL at 11:04

## 2024-03-15 RX ADMIN — LIDOCAINE 1 PATCH: 4 CREAM TOPICAL at 19:30

## 2024-03-15 RX ADMIN — OXYCODONE HYDROCHLORIDE 10 MILLIGRAM(S): 5 TABLET ORAL at 22:34

## 2024-03-15 RX ADMIN — OXYCODONE HYDROCHLORIDE 10 MILLIGRAM(S): 5 TABLET ORAL at 10:26

## 2024-03-15 RX ADMIN — TRAMADOL HYDROCHLORIDE 50 MILLIGRAM(S): 50 TABLET ORAL at 20:30

## 2024-03-15 RX ADMIN — SENNA PLUS 2 TABLET(S): 8.6 TABLET ORAL at 22:33

## 2024-03-15 RX ADMIN — Medication 5 MILLIGRAM(S): at 05:21

## 2024-03-15 RX ADMIN — OXYCODONE HYDROCHLORIDE 10 MILLIGRAM(S): 5 TABLET ORAL at 03:40

## 2024-03-15 RX ADMIN — ENOXAPARIN SODIUM 40 MILLIGRAM(S): 100 INJECTION SUBCUTANEOUS at 22:38

## 2024-03-15 RX ADMIN — OXYCODONE HYDROCHLORIDE 10 MILLIGRAM(S): 5 TABLET ORAL at 18:10

## 2024-03-15 RX ADMIN — OXYCODONE HYDROCHLORIDE 10 MILLIGRAM(S): 5 TABLET ORAL at 09:26

## 2024-03-15 RX ADMIN — Medication 975 MILLIGRAM(S): at 22:34

## 2024-03-15 RX ADMIN — BENZOCAINE AND MENTHOL 1 LOZENGE: 5; 1 LIQUID ORAL at 22:39

## 2024-03-15 RX ADMIN — NALOXEGOL OXALATE 12.5 MILLIGRAM(S): 12.5 TABLET, FILM COATED ORAL at 11:04

## 2024-03-15 RX ADMIN — Medication 3 MILLIGRAM(S): at 22:35

## 2024-03-15 NOTE — PROGRESS NOTE ADULT - SUBJECTIVE AND OBJECTIVE BOX
63-year-old female history of gastric bypass, hypertension, hyperlipidemia, depression, social EtOH use, transferred from [place] for C5-C6 vertebral body fracture.  Yesterday patient got up to go to the kitchen, felt lightheaded, had a syncopal episode and fell on the floor and laid on the floor all night because of right arm and right leg weakness.  Eventually was able to make a call and presented to the ED at [place].  Continues to complain of right upper lower extremity weakness, and bilateral shoulder pain.  Denies chest pain or shortness of breath, nausea vomiting, also complains of abdominal upper back pain. Patient was transferred to [facility] for further treatment. Patient is s/p a C3-7 decompression. She tolerated the procedure well. Patient has started working with physical therapy. Pain has been well managed.      MEDICATIONS  (STANDING):  acetaminophen     Tablet .. 975 milliGRAM(s) Oral every 8 hours  atorvastatin 10 milliGRAM(s) Oral at bedtime  chlorhexidine 2% Cloths 1 Application(s) Topical <User Schedule>  enalapril 5 milliGRAM(s) Oral daily  enoxaparin Injectable 40 milliGRAM(s) SubCutaneous every 24 hours  lidocaine   4% Patch 1 Patch Transdermal daily  lidocaine   4% Patch 1 Patch Transdermal daily  melatonin 3 milliGRAM(s) Oral at bedtime  multivitamin 1 Tablet(s) Oral daily  naloxegol 12.5 milliGRAM(s) Oral daily  polyethylene glycol 3350 17 Gram(s) Oral daily  senna 2 Tablet(s) Oral at bedtime    MEDICATIONS  (PRN):  benzocaine/menthol Lozenge 1 Lozenge Oral every 2 hours PRN Sore Throat  butorphanol Injectable 0.25 milliGRAM(s) IV Push every 6 hours PRN Pruritus  naloxone Injectable 0.1 milliGRAM(s) IV Push every 3 minutes PRN For ANY of the following changes in patient status:  A. RR LESS THAN 10 breaths per minute, B. Oxygen saturation LESS THAN 90%, C. Sedation score of 6  ondansetron Injectable 4 milliGRAM(s) IV Push every 6 hours PRN Nausea  oxyCODONE    IR 10 milliGRAM(s) Oral every 4 hours PRN Severe Pain (7 - 10)  oxyCODONE    IR 5 milliGRAM(s) Oral every 4 hours PRN Moderate Pain (4 - 6)  pantoprazole    Tablet 40 milliGRAM(s) Oral before breakfast PRN Acid Reflux  tiZANidine 4 milliGRAM(s) Oral every 8 hours PRN Muscle Spasm  traMADol 50 milliGRAM(s) Oral every 6 hours PRN Mild Pain (1 - 3)          VITALS:   T(C): 36.6 (03-15-24 @ 12:45), Max: 36.9 (03-14-24 @ 20:26)  HR: 82 (03-15-24 @ 12:45) (65 - 82)  BP: 101/69 (03-15-24 @ 12:45) (101/69 - 120/82)  RR: 18 (03-15-24 @ 12:45) (18 - 18)  SpO2: 99% (03-15-24 @ 12:45) (96% - 99%)  Wt(kg): --    PHYSICAL EXAM:  GENERAL: NAD, well-groomed, well-developed  HEAD:  Atraumatic, Normocephalic  EYES: EOMI, PERRLA, conjunctiva and sclera clear  ENMT: No tonsillar erythema, exudates, or enlargement; Moist mucous membranes, Good dentition, No lesions  NECK: Supple, No JVD, Normal thyroid  NERVOUS SYSTEM:  Alert & Oriented X3, Good concentration b/l UE weakness right greater the left  CHEST/LUNG: Clear to percussion bilaterally; No rales, rhonchi, wheezing, or rubs  HEART: Regular rate and rhythm; No murmurs, rubs, or gallops  ABDOMEN: Soft, Nontender, Nondistended; Bowel sounds present  EXTREMITIES:  2+ Peripheral Pulses, No clubbing, cyanosis, or edema  LYMPH: No lymphadenopathy noted  SKIN: No rashes or lesions    LABS:                      CAPILLARY BLOOD GLUCOSE          RADIOLOGY & ADDITIONAL TESTS:

## 2024-03-15 NOTE — PROGRESS NOTE ADULT - ASSESSMENT
A/P: 62 y/o F POD#5 s/p C3-C7 Decompression/PSF      DVT ppx - Lovenox  Ambulation as tolerated, IPC while in bed  C- Collar - call placed to orthotist for fitting due to discomfort.   PT/OT  Pain management prn  Gi ppx  Discharge planning to [facility] PA-C  Orthopedic Surgery  Team Pager: [phone]

## 2024-03-15 NOTE — PROGRESS NOTE ADULT - SUBJECTIVE AND OBJECTIVE BOX
Patient 63 year old female post C3-C7 fusion surgery  evaluated custom measured fitted for rigid cervical  spinal orthosis Aspen model for better fit and function to  immobilize  cervical spine in all planes with better comfort  ordered by Orthopedics delivered by [facility] [phone]

## 2024-03-16 ENCOUNTER — INPATIENT (INPATIENT)
Facility: HOSPITAL | Age: 64
LOS: 12 days | Discharge: HOME HEALTH | DRG: 951 | End: 2024-03-29
Attending: PHYSICAL MEDICINE & REHABILITATION | Admitting: PHYSICAL MEDICINE & REHABILITATION
Payer: COMMERCIAL

## 2024-03-16 ENCOUNTER — TRANSCRIPTION ENCOUNTER (OUTPATIENT)
Age: 64
End: 2024-03-16

## 2024-03-16 VITALS
DIASTOLIC BLOOD PRESSURE: 70 MMHG | SYSTOLIC BLOOD PRESSURE: 112 MMHG | TEMPERATURE: 98 F | HEART RATE: 67 BPM | WEIGHT: 153.44 LBS | HEIGHT: 64 IN | OXYGEN SATURATION: 97 % | RESPIRATION RATE: 16 BRPM

## 2024-03-16 VITALS
HEART RATE: 77 BPM | OXYGEN SATURATION: 96 % | DIASTOLIC BLOOD PRESSURE: 89 MMHG | RESPIRATION RATE: 18 BRPM | SYSTOLIC BLOOD PRESSURE: 125 MMHG | TEMPERATURE: 98 F

## 2024-03-16 DIAGNOSIS — Z98.1 ARTHRODESIS STATUS: ICD-10-CM

## 2024-03-16 DIAGNOSIS — R25.2 CRAMP AND SPASM: ICD-10-CM

## 2024-03-16 DIAGNOSIS — S12.500D UNSPECIFIED DISPLACED FRACTURE OF SIXTH CERVICAL VERTEBRA, SUBSEQUENT ENCOUNTER FOR FRACTURE WITH ROUTINE HEALING: ICD-10-CM

## 2024-03-16 DIAGNOSIS — I10 ESSENTIAL (PRIMARY) HYPERTENSION: ICD-10-CM

## 2024-03-16 DIAGNOSIS — R29.898 OTHER SYMPTOMS AND SIGNS INVOLVING THE MUSCULOSKELETAL SYSTEM: ICD-10-CM

## 2024-03-16 DIAGNOSIS — R26.89 OTHER ABNORMALITIES OF GAIT AND MOBILITY: ICD-10-CM

## 2024-03-16 DIAGNOSIS — S12.400D UNSPECIFIED DISPLACED FRACTURE OF FIFTH CERVICAL VERTEBRA, SUBSEQUENT ENCOUNTER FOR FRACTURE WITH ROUTINE HEALING: ICD-10-CM

## 2024-03-16 DIAGNOSIS — G83.21 MONOPLEGIA OF UPPER LIMB AFFECTING RIGHT DOMINANT SIDE: ICD-10-CM

## 2024-03-16 DIAGNOSIS — S06.9X9D UNSPECIFIED INTRACRANIAL INJURY WITH LOSS OF CONSCIOUSNESS OF UNSPECIFIED DURATION, SUBSEQUENT ENCOUNTER: ICD-10-CM

## 2024-03-16 DIAGNOSIS — F32.A DEPRESSION, UNSPECIFIED: ICD-10-CM

## 2024-03-16 DIAGNOSIS — Z98.84 BARIATRIC SURGERY STATUS: Chronic | ICD-10-CM

## 2024-03-16 DIAGNOSIS — I95.9 HYPOTENSION, UNSPECIFIED: ICD-10-CM

## 2024-03-16 DIAGNOSIS — S14.123D CENTRAL CORD SYNDROME AT C3 LEVEL OF CERVICAL SPINAL CORD, SUBSEQUENT ENCOUNTER: ICD-10-CM

## 2024-03-16 DIAGNOSIS — R53.1 WEAKNESS: ICD-10-CM

## 2024-03-16 DIAGNOSIS — E78.5 HYPERLIPIDEMIA, UNSPECIFIED: ICD-10-CM

## 2024-03-16 DIAGNOSIS — G83.24 MONOPLEGIA OF UPPER LIMB AFFECTING LEFT NONDOMINANT SIDE: ICD-10-CM

## 2024-03-16 DIAGNOSIS — Z51.89 ENCOUNTER FOR OTHER SPECIFIED AFTERCARE: ICD-10-CM

## 2024-03-16 DIAGNOSIS — K21.9 GASTRO-ESOPHAGEAL REFLUX DISEASE WITHOUT ESOPHAGITIS: ICD-10-CM
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
## 2024-03-16 DIAGNOSIS — E44.0 MODERATE PROTEIN-CALORIE MALNUTRITION: ICD-10-CM

## 2024-03-16 DIAGNOSIS — R20.0 ANESTHESIA OF SKIN: ICD-10-CM

## 2024-03-16 LAB
FLUAV AG NPH QL: SIGNIFICANT CHANGE UP
FLUBV AG NPH QL: SIGNIFICANT CHANGE UP
RSV RNA NPH QL NAA+NON-PROBE: SIGNIFICANT CHANGE UP
SARS-COV-2 RNA SPEC QL NAA+PROBE: SIGNIFICANT CHANGE UP

## 2024-03-16 PROCEDURE — 86803 HEPATITIS C AB TEST: CPT

## 2024-03-16 PROCEDURE — 82330 ASSAY OF CALCIUM: CPT

## 2024-03-16 PROCEDURE — 85014 HEMATOCRIT: CPT

## 2024-03-16 PROCEDURE — 97116 GAIT TRAINING THERAPY: CPT

## 2024-03-16 PROCEDURE — 85730 THROMBOPLASTIN TIME PARTIAL: CPT

## 2024-03-16 PROCEDURE — 93308 TTE F-UP OR LMTD: CPT

## 2024-03-16 PROCEDURE — 71250 CT THORAX DX C-: CPT | Mod: MC

## 2024-03-16 PROCEDURE — 70498 CT ANGIOGRAPHY NECK: CPT | Mod: MC

## 2024-03-16 PROCEDURE — 76377 3D RENDER W/INTRP POSTPROCES: CPT

## 2024-03-16 PROCEDURE — 82435 ASSAY OF BLOOD CHLORIDE: CPT

## 2024-03-16 PROCEDURE — 99285 EMERGENCY DEPT VISIT HI MDM: CPT | Mod: 25

## 2024-03-16 PROCEDURE — 36415 COLL VENOUS BLD VENIPUNCTURE: CPT

## 2024-03-16 PROCEDURE — 84100 ASSAY OF PHOSPHORUS: CPT

## 2024-03-16 PROCEDURE — C9399: CPT

## 2024-03-16 PROCEDURE — 97530 THERAPEUTIC ACTIVITIES: CPT

## 2024-03-16 PROCEDURE — 96374 THER/PROPH/DIAG INJ IV PUSH: CPT

## 2024-03-16 PROCEDURE — 85027 COMPLETE CBC AUTOMATED: CPT

## 2024-03-16 PROCEDURE — 82550 ASSAY OF CK (CPK): CPT

## 2024-03-16 PROCEDURE — 86900 BLOOD TYPING SEROLOGIC ABO: CPT

## 2024-03-16 PROCEDURE — 76000 FLUOROSCOPY <1 HR PHYS/QHP: CPT

## 2024-03-16 PROCEDURE — 82803 BLOOD GASES ANY COMBINATION: CPT

## 2024-03-16 PROCEDURE — 86901 BLOOD TYPING SEROLOGIC RH(D): CPT

## 2024-03-16 PROCEDURE — 73110 X-RAY EXAM OF WRIST: CPT

## 2024-03-16 PROCEDURE — 73200 CT UPPER EXTREMITY W/O DYE: CPT | Mod: MC

## 2024-03-16 PROCEDURE — 97165 OT EVAL LOW COMPLEX 30 MIN: CPT

## 2024-03-16 PROCEDURE — 93321 DOPPLER ECHO F-UP/LMTD STD: CPT

## 2024-03-16 PROCEDURE — 83735 ASSAY OF MAGNESIUM: CPT

## 2024-03-16 PROCEDURE — 84484 ASSAY OF TROPONIN QUANT: CPT

## 2024-03-16 PROCEDURE — 83605 ASSAY OF LACTIC ACID: CPT

## 2024-03-16 PROCEDURE — 97110 THERAPEUTIC EXERCISES: CPT

## 2024-03-16 PROCEDURE — 86850 RBC ANTIBODY SCREEN: CPT

## 2024-03-16 PROCEDURE — C1889: CPT

## 2024-03-16 PROCEDURE — 84295 ASSAY OF SERUM SODIUM: CPT

## 2024-03-16 PROCEDURE — 97162 PT EVAL MOD COMPLEX 30 MIN: CPT

## 2024-03-16 PROCEDURE — 82962 GLUCOSE BLOOD TEST: CPT

## 2024-03-16 PROCEDURE — 99222 1ST HOSP IP/OBS MODERATE 55: CPT

## 2024-03-16 PROCEDURE — 72148 MRI LUMBAR SPINE W/O DYE: CPT | Mod: MC

## 2024-03-16 PROCEDURE — C1769: CPT

## 2024-03-16 PROCEDURE — 72146 MRI CHEST SPINE W/O DYE: CPT | Mod: MC

## 2024-03-16 PROCEDURE — 72141 MRI NECK SPINE W/O DYE: CPT | Mod: MC

## 2024-03-16 PROCEDURE — 82947 ASSAY GLUCOSE BLOOD QUANT: CPT

## 2024-03-16 PROCEDURE — 73130 X-RAY EXAM OF HAND: CPT

## 2024-03-16 PROCEDURE — 70496 CT ANGIOGRAPHY HEAD: CPT | Mod: MC

## 2024-03-16 PROCEDURE — 96375 TX/PRO/DX INJ NEW DRUG ADDON: CPT

## 2024-03-16 PROCEDURE — C1713: CPT

## 2024-03-16 PROCEDURE — P9045: CPT

## 2024-03-16 PROCEDURE — 71045 X-RAY EXAM CHEST 1 VIEW: CPT

## 2024-03-16 PROCEDURE — 84132 ASSAY OF SERUM POTASSIUM: CPT

## 2024-03-16 PROCEDURE — 80053 COMPREHEN METABOLIC PANEL: CPT

## 2024-03-16 PROCEDURE — 85018 HEMOGLOBIN: CPT

## 2024-03-16 PROCEDURE — 85610 PROTHROMBIN TIME: CPT

## 2024-03-16 PROCEDURE — 85025 COMPLETE CBC W/AUTO DIFF WBC: CPT

## 2024-03-16 RX ORDER — OXYCODONE HYDROCHLORIDE 5 MG/1
10 TABLET ORAL EVERY 4 HOURS
Refills: 0 | Status: DISCONTINUED | OUTPATIENT
Start: 2024-03-16 | End: 2024-03-20

## 2024-03-16 RX ORDER — TRAMADOL HYDROCHLORIDE 50 MG/1
50 TABLET ORAL EVERY 6 HOURS
Refills: 0 | Status: DISCONTINUED | OUTPATIENT
Start: 2024-03-16 | End: 2024-03-20

## 2024-03-16 RX ORDER — PANTOPRAZOLE SODIUM 20 MG/1
40 TABLET, DELAYED RELEASE ORAL
Refills: 0 | Status: DISCONTINUED | OUTPATIENT
Start: 2024-03-17 | End: 2024-03-29

## 2024-03-16 RX ORDER — ENOXAPARIN SODIUM 100 MG/ML
40 INJECTION SUBCUTANEOUS EVERY 24 HOURS
Refills: 0 | Status: DISCONTINUED | OUTPATIENT
Start: 2024-03-16 | End: 2024-03-29

## 2024-03-16 RX ORDER — LIDOCAINE 4 G/100G
1 CREAM TOPICAL DAILY
Refills: 0 | Status: DISCONTINUED | OUTPATIENT
Start: 2024-03-17 | End: 2024-03-29

## 2024-03-16 RX ORDER — TIZANIDINE 4 MG/1
4 TABLET ORAL THREE TIMES A DAY
Refills: 0 | Status: DISCONTINUED | OUTPATIENT
Start: 2024-03-16 | End: 2024-03-29

## 2024-03-16 RX ORDER — ACETAMINOPHEN 500 MG
975 TABLET ORAL EVERY 8 HOURS
Refills: 0 | Status: DISCONTINUED | OUTPATIENT
Start: 2024-03-16 | End: 2024-03-23

## 2024-03-16 RX ORDER — POLYETHYLENE GLYCOL 3350 17 G/17G
17 POWDER, FOR SOLUTION ORAL DAILY
Refills: 0 | Status: DISCONTINUED | OUTPATIENT
Start: 2024-03-17 | End: 2024-03-29

## 2024-03-16 RX ORDER — NALOXEGOL OXALATE 12.5 MG/1
12.5 TABLET, FILM COATED ORAL DAILY
Refills: 0 | Status: DISCONTINUED | OUTPATIENT
Start: 2024-03-17 | End: 2024-03-29

## 2024-03-16 RX ORDER — BENZOCAINE AND MENTHOL 5; 1 G/100ML; G/100ML
1 LIQUID ORAL
Refills: 0 | Status: DISCONTINUED | OUTPATIENT
Start: 2024-03-16 | End: 2024-03-29

## 2024-03-16 RX ORDER — SENNA PLUS 8.6 MG/1
2 TABLET ORAL AT BEDTIME
Refills: 0 | Status: DISCONTINUED | OUTPATIENT
Start: 2024-03-16 | End: 2024-03-29

## 2024-03-16 RX ORDER — LANOLIN ALCOHOL/MO/W.PET/CERES
3 CREAM (GRAM) TOPICAL AT BEDTIME
Refills: 0 | Status: DISCONTINUED | OUTPATIENT
Start: 2024-03-16 | End: 2024-03-29

## 2024-03-16 RX ORDER — PREGABALIN 225 MG/1
1000 CAPSULE ORAL DAILY
Refills: 0 | Status: DISCONTINUED | OUTPATIENT
Start: 2024-03-17 | End: 2024-03-29

## 2024-03-16 RX ORDER — OXYCODONE HYDROCHLORIDE 5 MG/1
5 TABLET ORAL EVERY 4 HOURS
Refills: 0 | Status: DISCONTINUED | OUTPATIENT
Start: 2024-03-16 | End: 2024-03-20

## 2024-03-16 RX ORDER — ATORVASTATIN CALCIUM 80 MG/1
10 TABLET, FILM COATED ORAL AT BEDTIME
Refills: 0 | Status: DISCONTINUED | OUTPATIENT
Start: 2024-03-16 | End: 2024-03-29

## 2024-03-16 RX ADMIN — TRAMADOL HYDROCHLORIDE 50 MILLIGRAM(S): 50 TABLET ORAL at 21:00

## 2024-03-16 RX ADMIN — TRAMADOL HYDROCHLORIDE 50 MILLIGRAM(S): 50 TABLET ORAL at 12:58

## 2024-03-16 RX ADMIN — TIZANIDINE 4 MILLIGRAM(S): 4 TABLET ORAL at 12:58

## 2024-03-16 RX ADMIN — OXYCODONE HYDROCHLORIDE 10 MILLIGRAM(S): 5 TABLET ORAL at 03:59

## 2024-03-16 RX ADMIN — OXYCODONE HYDROCHLORIDE 10 MILLIGRAM(S): 5 TABLET ORAL at 10:08

## 2024-03-16 RX ADMIN — Medication 975 MILLIGRAM(S): at 22:11

## 2024-03-16 RX ADMIN — Medication 975 MILLIGRAM(S): at 14:31

## 2024-03-16 RX ADMIN — OXYCODONE HYDROCHLORIDE 10 MILLIGRAM(S): 5 TABLET ORAL at 11:00

## 2024-03-16 RX ADMIN — OXYCODONE HYDROCHLORIDE 10 MILLIGRAM(S): 5 TABLET ORAL at 03:32

## 2024-03-16 RX ADMIN — Medication 975 MILLIGRAM(S): at 05:49

## 2024-03-16 RX ADMIN — TRAMADOL HYDROCHLORIDE 50 MILLIGRAM(S): 50 TABLET ORAL at 06:00

## 2024-03-16 RX ADMIN — LIDOCAINE 1 PATCH: 4 CREAM TOPICAL at 12:59

## 2024-03-16 RX ADMIN — Medication 975 MILLIGRAM(S): at 06:19

## 2024-03-16 RX ADMIN — TRAMADOL HYDROCHLORIDE 50 MILLIGRAM(S): 50 TABLET ORAL at 13:50

## 2024-03-16 RX ADMIN — CHLORHEXIDINE GLUCONATE 1 APPLICATION(S): 213 SOLUTION TOPICAL at 05:48

## 2024-03-16 RX ADMIN — POLYETHYLENE GLYCOL 3350 17 GRAM(S): 17 POWDER, FOR SOLUTION ORAL at 12:58

## 2024-03-16 RX ADMIN — Medication 3 MILLIGRAM(S): at 22:18

## 2024-03-16 RX ADMIN — OXYCODONE HYDROCHLORIDE 10 MILLIGRAM(S): 5 TABLET ORAL at 18:38

## 2024-03-16 RX ADMIN — ENOXAPARIN SODIUM 40 MILLIGRAM(S): 100 INJECTION SUBCUTANEOUS at 20:20

## 2024-03-16 RX ADMIN — OXYCODONE HYDROCHLORIDE 10 MILLIGRAM(S): 5 TABLET ORAL at 18:02

## 2024-03-16 RX ADMIN — SENNA PLUS 2 TABLET(S): 8.6 TABLET ORAL at 22:11

## 2024-03-16 RX ADMIN — NALOXEGOL OXALATE 12.5 MILLIGRAM(S): 12.5 TABLET, FILM COATED ORAL at 12:58

## 2024-03-16 RX ADMIN — OXYCODONE HYDROCHLORIDE 10 MILLIGRAM(S): 5 TABLET ORAL at 15:04

## 2024-03-16 RX ADMIN — TRAMADOL HYDROCHLORIDE 50 MILLIGRAM(S): 50 TABLET ORAL at 05:26

## 2024-03-16 RX ADMIN — TIZANIDINE 4 MILLIGRAM(S): 4 TABLET ORAL at 20:50

## 2024-03-16 RX ADMIN — TRAMADOL HYDROCHLORIDE 50 MILLIGRAM(S): 50 TABLET ORAL at 20:20

## 2024-03-16 RX ADMIN — ATORVASTATIN CALCIUM 10 MILLIGRAM(S): 80 TABLET, FILM COATED ORAL at 22:11

## 2024-03-16 RX ADMIN — Medication 1 TABLET(S): at 12:58

## 2024-03-16 RX ADMIN — Medication 975 MILLIGRAM(S): at 15:30

## 2024-03-16 NOTE — DISCHARGE NOTE NURSING/CASE MANAGEMENT/SOCIAL WORK - NSDCPEFALRISK_GEN_ALL_CORE
For information on Fall & Injury Prevention, visit: [email or web address] OR  [email or web address] OR  https://www.cdc.gov/steadi/patient.html

## 2024-03-16 NOTE — PROGRESS NOTE ADULT - PROBLEM SELECTOR PLAN 6
Pain meds as needed  follow for oversedation  GI regime to prevent constipation.

## 2024-03-16 NOTE — H&P ADULT - NSHPPHYSICALEXAM_GEN_ALL_CORE
PHYSICAL EXAM  Constitutional - NAD, Comfortable  HEENT - NCAT, EOMI  Neck - in C-Collar  Chest - Breathing comfortably, No wheezing  Cardiovascular - S1S2   Abdomen - Soft  Extremities - No C/C/E, No calf tenderness   Neurologic Exam -                    Cognitive - AAO to self, place, date, year, situation     Communication - Fluent, No dysarthria     Cranial Nerves - CN 2-12 grossly intact     Motor -                     LEFT    UE - ShAB 5/5, EF 5/5, EE 5/5, WE 5/5,  5/5                    RIGHT UE - ShAB 3/5, EF 3/5, EE 3/5, WE 3/5,  3/5                    LEFT    LE - HF 5/5, KE 5/5, DF 5/5, PF 5/5                    RIGHT LE - HF 4/5, KE 4/5, DF 4/5, PF 4/5        Sensory - slightly decreased to light touch in all 4 extremities     Reflexes - DTR Intact, No primitive reflexive     Coordination - FTN intact on L, slowed ability on R     OculoVestibular - No nystagmus    Psychiatric - Mood stable, Affect WNL

## 2024-03-16 NOTE — PROGRESS NOTE ADULT - SUBJECTIVE AND OBJECTIVE BOX
63-year-old female history of gastric bypass, hypertension, hyperlipidemia, depression, social EtOH use, transferred from [place] for C5-C6 vertebral body fracture.  Yesterday patient got up to go to the kitchen, felt lightheaded, had a syncopal episode and fell on the floor and laid on the floor all night because of right arm and right leg weakness.  Eventually was able to make a call and presented to the ED at [place].  Continues to complain of right upper lower extremity weakness, and bilateral shoulder pain.  Denies chest pain or shortness of breath, nausea vomiting, also complains of abdominal upper back pain. Patient was transferred to [facility] for further treatment. Patient is s/p a C3-7 decompression. She tolerated the procedure well. Patient has started working with physical therapy. Pain has been well managed.      MEDICATIONS  (STANDING):  acetaminophen     Tablet .. 975 milliGRAM(s) Oral every 8 hours  atorvastatin 10 milliGRAM(s) Oral at bedtime  chlorhexidine 2% Cloths 1 Application(s) Topical <User Schedule>  enalapril 5 milliGRAM(s) Oral daily  enoxaparin Injectable 40 milliGRAM(s) SubCutaneous every 24 hours  lidocaine   4% Patch 1 Patch Transdermal daily  lidocaine   4% Patch 1 Patch Transdermal daily  melatonin 3 milliGRAM(s) Oral at bedtime  multivitamin 1 Tablet(s) Oral daily  naloxegol 12.5 milliGRAM(s) Oral daily  polyethylene glycol 3350 17 Gram(s) Oral daily  senna 2 Tablet(s) Oral at bedtime    MEDICATIONS  (PRN):  benzocaine/menthol Lozenge 1 Lozenge Oral every 2 hours PRN Sore Throat  butorphanol Injectable 0.25 milliGRAM(s) IV Push every 6 hours PRN Pruritus  naloxone Injectable 0.1 milliGRAM(s) IV Push every 3 minutes PRN For ANY of the following changes in patient status:  A. RR LESS THAN 10 breaths per minute, B. Oxygen saturation LESS THAN 90%, C. Sedation score of 6  ondansetron Injectable 4 milliGRAM(s) IV Push every 6 hours PRN Nausea  oxyCODONE    IR 10 milliGRAM(s) Oral every 4 hours PRN Severe Pain (7 - 10)  oxyCODONE    IR 5 milliGRAM(s) Oral every 4 hours PRN Moderate Pain (4 - 6)  pantoprazole    Tablet 40 milliGRAM(s) Oral before breakfast PRN Acid Reflux  tiZANidine 4 milliGRAM(s) Oral every 8 hours PRN Muscle Spasm  traMADol 50 milliGRAM(s) Oral every 6 hours PRN Mild Pain (1 - 3)          VITALS:   T(C): 36.6 (03-15-24 @ 12:45), Max: 36.9 (03-14-24 @ 20:26)  HR: 82 (03-15-24 @ 12:45) (65 - 82)  BP: 101/69 (03-15-24 @ 12:45) (101/69 - 120/82)  RR: 18 (03-15-24 @ 12:45) (18 - 18)  SpO2: 99% (03-15-24 @ 12:45) (96% - 99%)  Wt(kg): --    PHYSICAL EXAM:  GENERAL: NAD, well-groomed, well-developed  HEAD:  Atraumatic, Normocephalic  EYES: EOMI, PERRLA, conjunctiva and sclera clear  ENMT: No tonsillar erythema, exudates, or enlargement; Moist mucous membranes, Good dentition, No lesions  NECK: Supple, No JVD, Normal thyroid  NERVOUS SYSTEM:  Alert & Oriented X3, Good concentration b/l UE weakness right greater the left  CHEST/LUNG: Clear to percussion bilaterally; No rales, rhonchi, wheezing, or rubs  HEART: Regular rate and rhythm; No murmurs, rubs, or gallops  ABDOMEN: Soft, Nontender, Nondistended; Bowel sounds present  EXTREMITIES:  2+ Peripheral Pulses, No clubbing, cyanosis, or edema  LYMPH: No lymphadenopathy noted  SKIN: No rashes or lesions    LABS:                      CAPILLARY BLOOD GLUCOSE          RADIOLOGY & ADDITIONAL TESTS:

## 2024-03-16 NOTE — PROGRESS NOTE ADULT - PROBLEM SELECTOR PLAN 4
continue atorvastatin daily.

## 2024-03-16 NOTE — PROGRESS NOTE ADULT - SUBJECTIVE AND OBJECTIVE BOX
Patient is a 63y old  Female who presents with a chief complaint of Central cord syndrome  C5-6 inferior endplate syndrome   Patient s/p C3-7 decompression., PSF POD#7  Patient comfortable, in chair.  No complaints    T(C): 36.8 (03-16-24 @ 05:10), Max: 36.8 (03-15-24 @ 16:41)  HR: 73 (03-16-24 @ 05:10) (73 - 82)  BP: 101/64 (03-16-24 @ 05:10) (95/61 - 116/78)  RR: 18 (03-16-24 @ 05:10) (18 - 18)  SpO2: 96% (03-16-24 @ 05:10) (95% - 99%)      PHYSICAL EXAM:  NAD, Alert  Neckck: Dressing C/D/I; sensation grossly intact to light touch; (+) Distal Pulses; No Calf tenderness B/L, PAS          Upper extremity:       C5 (Shoulder Abd)    C6 (Elbow flex)   C7 (Elbow ext)                      C8                     T1                                                R         3/5                 3/5                      3/5                      4/5                         4/5                                               L          4/5                 4/5                      4/5                      5/5                         5/5               [ ] Lower extremeity                  PF          DF         EHL       FHL                                                                                            R        5/5        5/5        5/5       5/5                                                        L         5/5        5/5        5/5       5/5         Patient is a 63y old  Female who presents with a chief complaint of Central cord syndrome  C5-6 inferior endplate syndrome   Patient s/p C3-7 decompression., PSF POD#7  Patient comfortable, in chair.  No complaints    T(C): 36.8 (03-16-24 @ 05:10), Max: 36.8 (03-15-24 @ 16:41)  HR: 73 (03-16-24 @ 05:10) (73 - 82)  BP: 101/64 (03-16-24 @ 05:10) (95/61 - 116/78)  RR: 18 (03-16-24 @ 05:10) (18 - 18)  SpO2: 96% (03-16-24 @ 05:10) (95% - 99%)      PHYSICAL EXAM:  NAD, Alert  Neck: Aquacel dressing changed today, incision C/D/I; sensation grossly intact ; (+) Distal Pulses; No Calf tenderness B/L, PAS          Upper extremity:       C5 (Shoulder Abd)    C6 (Elbow flex)   C7 (Elbow ext)                      C8                     T1                                                R         3/5                 3/5                      3/5                      4/5                         4/5                                               L          4/5                 4/5                      4/5                      5/5                         5/5               [ ] Lower extremeity                  PF          DF         EHL       FHL                                                                                            R        5/5        5/5        5/5       5/5                                                        L         5/5        5/5        5/5       5/5

## 2024-03-16 NOTE — DISCHARGE NOTE NURSING/CASE MANAGEMENT/SOCIAL WORK - PATIENT PORTAL LINK FT
You can access the FollowMyHealth Patient Portal offered by [facility] by registering at the following website: [email or web address]. By joining [employer]’s FollowMyHealth portal, you will also be able to view your health information using other applications (apps) compatible with our system.

## 2024-03-16 NOTE — H&P ADULT - NSHPSOCIALHISTORY_GEN_ALL_CORE
Smoking - Denied  EtOH - Denied   Drugs - Denied     Lives w/ spouse in a townhouse w/ 2 JN  PTA: Independent in ADLs and ambulation     CURRENT FUNCTIONAL STATUS  Bed Mobility: Simona-CG   Transfers: min A  Gait: Simona

## 2024-03-16 NOTE — PROGRESS NOTE ADULT - PROBLEM SELECTOR PLAN 5
PPI daily  elevate the HOB while eating.

## 2024-03-16 NOTE — PROGRESS NOTE ADULT - PROBLEM SELECTOR PLAN 3
currently off pressors  continue to monitor  BP management as per ortho and SICU
Patient started on pressors to improve perfusion  continue to monitor  BP management as per ortho and SICU
currently off pressors  continue to monitor  BP management as per ortho and SICU
continue enalapril  will continue to monitor BP and adjust meds as needed  continue pain management.

## 2024-03-16 NOTE — H&P ADULT - ATTENDING COMMENTS
63F s/p syncope/fall resulting in central cord syndrome from C5-6 fractures s/p decompression and fusion, good candidate for acute rehab, agree with resident plan as above.

## 2024-03-16 NOTE — PROGRESS NOTE ADULT - PROBLEM SELECTOR PLAN 2
Continue to monitor rate  follow for arrythmia  Patient seen by cardiology   continue to monitor
Subjective:       Patient ID: Jesse Churchill is a 58 y.o. male.    Chief Complaint: Follow-up (med refill )    HPI  Wellness check.  Chart reviewed.  Weight down 6 lbs/ 3 mo.  Watching his Na intake.  Home SBPs frequently > 140.  No HA, CP, SOB.  Has rectal pain when constipated.  No other complaints.  Review of Systems   Constitutional: Negative for activity change and unexpected weight change.   HENT: Negative for hearing loss, rhinorrhea and trouble swallowing.    Eyes: Negative for discharge and visual disturbance.   Respiratory: Negative for chest tightness and wheezing.    Cardiovascular: Negative for chest pain and palpitations.   Gastrointestinal: Positive for constipation. Negative for blood in stool, diarrhea and vomiting.   Endocrine: Negative for polydipsia and polyuria.   Genitourinary: Negative for difficulty urinating, hematuria and urgency.   Musculoskeletal: Negative for arthralgias, joint swelling and neck pain.   Neurological: Negative for weakness and headaches.   Psychiatric/Behavioral: Negative for confusion and dysphoric mood.   All other systems reviewed and are negative.      Objective:      Physical Exam   Constitutional: He appears well-developed.   obese   HENT:   Head: Normocephalic.   Eyes: EOM are normal.   Neck: Normal range of motion.   Cardiovascular: Normal rate, regular rhythm, normal heart sounds and intact distal pulses.   Pulmonary/Chest: Effort normal and breath sounds normal.   Abdominal: Soft. Bowel sounds are normal. He exhibits no distension. There is no tenderness.   Musculoskeletal: Normal range of motion. He exhibits no edema.   Neurological: He is alert.   Skin: Skin is warm and dry.   Psychiatric: He has a normal mood and affect.   Vitals reviewed.      Assessment:       1. Routine general medical examination at a health care facility    2. Essential hypertension    3. Morbid obesity        Plan:       Jesse was seen today for follow-up.    Diagnoses and all 
orders for this visit:    Routine general medical examination at a health care facility  -     Influenza - Quadrivalent (PF)    Essential hypertension  -     Renal function panel; Future  -     valsartan-hydrochlorothiazide (DIOVAN-HCT) 320-25 mg per tablet; Take 1 tablet by mouth once daily.  -     amLODIPine (NORVASC) 5 MG tablet; Take 1 tablet (5 mg total) by mouth once daily.    Morbid obesity   Cont weight loss      Follow up in about 1 month (around 11/17/2019).      
Continue to monitor rate  follow for arrythmia  Patient seen by cardiology   continue to monitor  cause of syncope is unclear  possibly due to weight loss meds
Continue to monitor rate  follow for arrythmia  Patient seen by cardiology   Echo:  1. Left ventricular cavity is normal in size. Left ventricular wall thickness is normal. There are no regional wall motion abnormalities seen.   2. The right ventricle is not well visualized. probably normal systolic function.   3. No pericardial effusion seen.   4. Left ventricular endocardium is not well visualized; however, the left ventricular systolic function appears grossly normal.
Continue to monitor rate  follow for arrythmia  Patient seen by cardiology   continue to monitor  cause of syncope is unclear  possibly due to weight loss meds
Continue to monitor rate  follow for arrythmia  Patient seen by cardiology   continue to monitor  cause of syncope is unclear  possibly due to weight loss meds
Continue to monitor rate  follow for arrythmia  Patient seen by cardiology   Echo:  1. Left ventricular cavity is normal in size. Left ventricular wall thickness is normal. There are no regional wall motion abnormalities seen.   2. The right ventricle is not well visualized. probably normal systolic function.   3. No pericardial effusion seen.   4. Left ventricular endocardium is not well visualized; however, the left ventricular systolic function appears grossly normal.

## 2024-03-16 NOTE — H&P ADULT - NSHPREVIEWOFSYSTEMS_GEN_ALL_CORE
REVIEW OF SYSTEMS  Constitutional - No fever  HEENT - No eye pain, No visual disturbance  Respiratory - No cough, No wheezing, No shortness of breath  Cardiovascular - No chest pain, No palpitations  Gastrointestinal - No abdominal pain, No nausea, No vomiting  Neurological - + numbness in all 4 extremities, weakness R>L  Skin -No rashes  Musculoskeletal - No joint swelling  Psychiatric - No depression, No anxiety Constitutional - No fever  HEENT - No eye pain, No visual disturbance  Respiratory - No cough, No wheezing, No shortness of breath  Cardiovascular - No chest pain, No palpitations  Gastrointestinal - No abdominal pain, No nausea, No vomiting  Neurological - + numbness in all 4 extremities, weakness R>L  Skin -No rashes  Musculoskeletal - No joint swelling  Psychiatric - No depression, No anxiety

## 2024-03-16 NOTE — H&P ADULT - ASSESSMENT
Assessment/Plan:  MELVI SCHMIDT is a 63y with ****.  Hospital Course complicated by ***. Patient now admitted for a multidisciplinary rehab program. 03-16-24 @ 12:56        Comprehensive Multidisciplinary Rehab Program:  - Start comprehensive rehab program of PT/OT/SLP - 3 hours a day, 5 days a week. P&O as needed       HTN  -   - Monitor BP    HLD  - cont statin    T2DM  - Lantus  - Premeal  - SSI  - Monitor fingersticks    Pain  - Tylenol PRN  - Avoid sedating medications that may interfere with cognitive recovery    Mood / Cognition  - Neuropsychology consult  - [ ] Enhanced Supervision  [ ] Constant Observation    Sleep  - Maintain quiet hours and a low stim environment.   - Monitor sleep logs (for BIU)  - Melatonin PRN     GI / Bowel  - Senna qHS  - Miralax PRN Daily  - GI ppx: ***     / Bladder  - Currently patient voids:      [ ] independent      [ ] external collection device (condom cath)      [ ] Indwelling galicia catheter      [ ] Intermittent catheterization  - Continue bladder scans Q8 hours with straight cath for >400cc.  - Toileting schedule every 4 hours    Skin / Pressure injury  - Skin assessment on admission performed [  ] :   - Monitor Incisions:    - Turn q2 hours in bed while awake, air mattress  - nursing to monitor skin qShift  - soft heel protectors  - skin barrier cream PRN    Diet/Dysphagia:  - Diet Consistency: ***  - Supplements: ***  - Aspiration Precautions  - SLP consult for swallow function evaluation and treatment  - Nutrition consult    DVT prophylaxis:   - *****  - SCDs  - Last Doppler on ***       Outpatient Follow-up:  ** Specialty and Name of physician      Code Status/Emergency Contact:      ---------------    Goals: Safe discharge to home  Estimated Length of Stay: 10-14 days  Rehab Potential: Good  Medical Prognosis: Good  Estimated Disposition: Home with home care      PRESCREEN COMPARISON:  I have reviewed the prescreen information and I have found no relevant changes between the preadmission screening and my post admission evaluation.    RATIONALE FOR INPATIENT ADMISSION: Patient demonstrates the following:  [X] Medically appropriate for rehabilitation admission  [X] Has attainable rehab goals with an appropriate initial discharge plan  [X]Has rehabilitation potential (expected to make a significant improvement within a reasonable period of time)  [X] Requires close medical management by a rehab physician, rehab nursing care, Hospitalist and comprehensive interdisciplinary team (including PT, OT and/or SLP, Prosthetics and Orthotics)     Assessment/Plan:  MELVI SCHMIDT is a 63y with Anders Y bypass, HTN, HLD, depression who was admitted for new onset weakness in BUE after a fall. Found to have central cord syndrome from stenosis in setting of C5 and 6 fractures s/p Decompression and posterior fusion of the cervical spine.  Patient now admitted for a multidisciplinary rehab program. 03-16-24 @ 12:56    Central Cord syndrome from severe C3-6 canal stenosis   s/p C3-7 decompression w/ posterior spinal fusion and muscle flap reconstruction  Comprehensive Multidisciplinary Rehab Program:  - pt had a fall and found to have C5 and C6 fractures  - MRI demonstrated severe canal stenosis in the cervical spine  - s/p Cervical decompression and posterior fusion per Dr. Layton on 3/8  - s/p muscle flap reconstruction w/ plastics consulted  - patient to remain in C collar at all times   - Start comprehensive rehab program of PT/OT      PT to focus on BUE paresis in setting of cord compression working on strengthening of extremities as well as transfers and gait training      OT to focus on functional deficits in setting of BUE paresis  - pain regimen below  - Keep surgical dressing clean and dry, have dressing/sutures removed and steri-strips applied post operative day#13 - 3/22    HTN  - enalapril 5mg daily  - Monitor BP    HLD  - atorvastatin 10mg daily    Pain  - Tylenol scheduled, tramadol prn, oxycodone prn, prn tizanidine  - Avoid sedating medications that may interfere with cognitive recovery    Sleep  - Maintain quiet hours and a low stim environment.   - Melatonin PRN     GI / Bowel  s/p Anders en Y bypass  - Senna qHS  - Miralax Daily  - GI ppx: protonix     / Bladder  - Currently patient voids:      [X] independent      [ ] external collection device (condom cath)      [ ] Indwelling galicia catheter      [ ] Intermittent catheterization  - bladder scan once on admission with straight cath for >400cc.    Skin / Pressure injury  - Skin assessment on admission performed [  ] :   - Monitor Incisions:  posterior cervical incision site  - Turn q2 hours in bed while awake, air mattress  - nursing to monitor skin qShift  - soft heel protectors  - skin barrier cream PRN    Diet/Dysphagia:  - Diet Consistency: DASH  - Supplements: b12 and MVI  - Aspiration Precautions  - Nutrition consult    DVT prophylaxis:   - Lovenox    Outpatient Follow-up:  Pasha Layton  Orthopaedic Surgery  [address], [address]  [place] [address]  Phone: [phone]  Fax: [phone]  Follow Up Time: 2 weeks        Code Status/Emergency Contact:  Full Code     Steven - spouse - [phone]  PCP is Dr. Beckman.  [facility] uses [facility] [place] as [facility] preferred [facility].   ---------------    Goals: Safe discharge to home  Estimated Length of Stay: 10-14 days  Rehab Potential: Good  Medical Prognosis: Good  Estimated Disposition: Home with home care      PRESCREEN COMPARISON:  I have reviewed the prescreen information and I have found no relevant changes between the preadmission screening and my post admission evaluation.    RATIONALE FOR INPATIENT ADMISSION: Patient demonstrates the following:  [X] Medically appropriate for rehabilitation admission  [X] Has attainable rehab goals with an appropriate initial discharge plan  [X]Has rehabilitation potential (expected to make a significant improvement within a reasonable period of time)  [X] Requires close medical management by a rehab physician, rehab nursing care, Hospitalist and comprehensive interdisciplinary team (including PT, OT and/or SLP, Prosthetics and Orthotics)     Assessment/Plan:  MELVI SCHMIDT is a 63y with Anders Y bypass, HTN, HLD, depression who was admitted for new onset weakness in BUE after a fall. Found to have central cord syndrome from stenosis in setting of C5 and 6 fractures s/p Decompression and posterior fusion of the cervical spine.  Patient now admitted for a multidisciplinary rehab program. 03-16-24 @ 12:56    Central Cord syndrome from severe C3-6 canal stenosis   s/p C3-7 decompression w/ posterior spinal fusion and muscle flap reconstruction  Comprehensive Multidisciplinary Rehab Program:  - pt had a fall and found to have C5 and C6 fractures  - MRI demonstrated severe canal stenosis in the cervical spine  - s/p Cervical decompression and posterior fusion per Dr. Layton on 3/8  - s/p muscle flap reconstruction w/ plastics consulted  - patient to remain in C collar at all times   - Start comprehensive rehab program of PT/OT      PT to focus on BUE paresis in setting of cord compression working on strengthening of extremities as well as transfers and gait training      OT to focus on functional deficits in setting of BUE paresis  - pain regimen below  - Keep surgical dressing clean and dry, have dressing/sutures removed and steri-strips applied post operative day#13 - 3/22    HTN  - enalapril 5mg daily  - Monitor BP    HLD  - atorvastatin 10mg daily    Pain  - Tylenol scheduled, tramadol prn, oxycodone prn, prn tizanidine  - lidocaine patches bilateral shoulders  - Avoid sedating medications that may interfere with cognitive recovery    Sleep  - Maintain quiet hours and a low stim environment.   - Melatonin PRN     GI / Bowel  s/p Anders en Y bypass  - Senna qHS  - Miralax Daily  - GI ppx: protonix  - naloxegol 12.5mg daily     / Bladder  - Currently patient voids:      [X] independent      [ ] external collection device (condom cath)      [ ] Indwelling galicia catheter      [ ] Intermittent catheterization  - bladder scan once on admission with straight cath for >400cc.    Skin / Pressure injury  - Skin assessment on admission performed [  ] :   - Monitor Incisions:  posterior cervical incision site  - Turn q2 hours in bed while awake, air mattress  - nursing to monitor skin qShift  - soft heel protectors  - skin barrier cream PRN    Diet/Dysphagia:  - Diet Consistency: DASH  - Supplements: b12 and MVI  - Aspiration Precautions  - Nutrition consult    DVT prophylaxis:   - Lovenox    Outpatient Follow-up:  Pasha Layton  Orthopaedic Surgery  [address] [place] [address], [address]  [place] [address]  Phone: [phone]  Fax: [phone]  Follow Up Time: 2 weeks        Code Status/Emergency Contact:  Full Code     Steven - spouse - [phone]  PCP is Dr. Beckman  [facility] [place] [facility].   ---------------    Goals: Safe discharge to home  Estimated Length of Stay: 10-14 days  Rehab Potential: Good  Medical Prognosis: Good  Estimated Disposition: Home with home care      PRESCREEN COMPARISON:  I have reviewed the prescreen information and I have found no relevant changes between the preadmission screening and my post admission evaluation.    RATIONALE FOR INPATIENT ADMISSION: Patient demonstrates the following:  [X] Medically appropriate for rehabilitation admission  [X] Has attainable rehab goals with an appropriate initial discharge plan  [X]Has rehabilitation potential (expected to make a significant improvement within a reasonable period of time)  [X] Requires close medical management by a rehab physician, rehab nursing care, Hospitalist and comprehensive interdisciplinary team (including PT, OT and/or SLP, Prosthetics and Orthotics)     Assessment/Plan:  Mrs. Ai Dumont is a 63-year-old female patient with past medical history of RYGB (Anders-en-Y gastric bypass), hypertension, hyperlipidemia, depression, and social EtOH use who is admitted for Acute Inpatient Rehabilitation with a multidisciplinary rehab program at [facility] with functional impairments in ADLs and mobility secondary to a traumatic cervical spinal cord injury after a syncope and fall with loss of consciousness.      Traumatic cervical spinal cord injury  - Central cord syndrome  - Severe central canal stenosis and cord compression at C3-C4, C4-C5 and C5-C6 with C5 and C6 fractures  - S/P C3-7 decompression w/ posterior spinal fusion by Neurosurgery and muscle flap reconstruction by Plastic Surgery on 3/8/24       * Patient to remain in C collar at all times        * Keep surgical dressing clean and dry, have dressing/sutures removed and steri-strips applied post operative day #13 (3/22/24)  - Traumatic brain injury with prolonged loss of consciousness  - Generalized weakness - UEs > LEs  - Impaired ADLs and mobility  - Need for assistance with personal care  - Start Comprehensive Multidisciplinary Rehab Program:       * PT to focus on BUE paresis in setting of cord compression working on strengthening of extremities as well as transfers and gait training       * OT to focus on functional deficits in setting of BUE paresis  - Pain regimen as below    HTN  - enalapril 5mg daily  - Monitor BP    HLD  - atorvastatin 10mg daily    Pain  - Tylenol scheduled, tramadol prn, oxycodone prn, prn tizanidine  - lidocaine patches bilateral shoulders  - Avoid sedating medications that may interfere with cognitive recovery    Sleep  - Maintain quiet hours and a low stim environment.   - Melatonin PRN     GI / Bowel  s/p Anders en Y bypass  - Senna qHS  - Miralax Daily  - GI ppx: protonix  - naloxegol 12.5mg daily     / Bladder  - Currently patient voids independently   - bladder scan once on admission with straight cath for >400cc.    Skin / Pressure injury assessment  - Skin assessment on admission performed   - Monitor Incisions:  posterior cervical incision site  - Turn q2 hours in bed while awake, air mattress  - nursing to monitor skin qShift  - soft heel protectors  - skin barrier cream PRN    Diet/Dysphagia assessment:  - Diet Consistency: DASH  - Supplements: b12 and MVI  - Aspiration Precautions  - Nutrition consult    DVT prophylaxis:   - Lovenox    Outpatient Follow-up:  Pasha Layton  Orthopaedic Surgery  [address] [place] [address], [address]  [place] [address]  Phone: [phone]  Fax: [phone]  Follow Up Time: 2 weeks    Code Status/Emergency Contact:  Full Code     Steven - spouse - [phone]  PCP is Dr. Beckman  [facility] [place] [facility].   ---------------    Goals: Safe discharge to home  Estimated Length of Stay: 21-30 days  Rehab Potential: Good  Medical Prognosis: Good  Estimated Disposition: Home with home care      PRESCREEN COMPARISON:  I have reviewed the prescreen information and I have found no relevant changes between the preadmission screening and my post admission evaluation.    RATIONALE FOR INPATIENT ADMISSION: Patient demonstrates the following:  [X] Medically appropriate for rehabilitation admission  [X] Has attainable rehab goals with an appropriate initial discharge plan  [X]Has rehabilitation potential (expected to make a significant improvement within a reasonable period of time)  [X] Requires close medical management by a rehab physician, rehab nursing care, Hospitalist and comprehensive interdisciplinary team (including PT, OT and/or SLP, Prosthetics and Orthotics)

## 2024-03-16 NOTE — PROGRESS NOTE ADULT - TIME BILLING
continue ATC cardiopulmonary monitoring with frequent neuro checks in this critically ill Patient   Discussed treatment plan with patient at bedside.
Discussed treatment plan with patient at bedside.  Patient DCed to rehab  continue current meds  PO as tolerated  Follow up with ortho
Discussed treatment plan with patient at bedside.
continue ATC cardiopulmonary monitoring in this critically ill Patient   Discussed treatment plan with patient at bedside.
Discussed treatment plan with patient at bedside.  Patient scheduled for DC to rehab 3/16
continue ATC cardiopulmonary monitoring with frequent neuro checks in this critically ill Patient   Discussed treatment plan with patient at bedside.
continue ATC cardiopulmonary monitoring in this critically ill Patient   Discussed treatment plan with patient at bedside.
Discussed treatment plan with patient at bedside.

## 2024-03-16 NOTE — PROGRESS NOTE ADULT - ASSESSMENT
Patient s/p C3-7 decompression., PSF POD#7, d/c to acute rehab today  Concepcion Aviles PA-C  Orthopaedic Surgery  Team pager [phone]  [facility] [phone]  [phone]

## 2024-03-16 NOTE — H&P ADULT - NSHPLABSRESULTS_GEN_ALL_CORE
Comprehensive Metabolic Panel (03.11.24 @ 22:29)   Sodium: 139 mmol/L  Potassium: 4.5 mmol/L  Chloride: 102 mmol/L  Carbon Dioxide: 27 mmol/L  Anion Gap: 10 mmol/L  Blood Urea Nitrogen: 17 mg/dL  Creatinine: 0.66 mg/dL  Glucose: 124 mg/dL  Calcium: 8.9 mg/dL  Protein Total: 6.2 g/dL  Albumin: 3.5 g/dL  Bilirubin Total: 0.2 mg/dL  Alkaline Phosphatase: 71 U/L  Aspartate Aminotransferase (AST/SGOT): 25 U/L  Alanine Aminotransferase (ALT/SGPT): 14 U/L    Complete Blood Count STAT (03.11.24 @ 22:29)   Nucleated RBC: 0 /100 WBCs  WBC Count: 10.53 K/uL  RBC Count: 3.86 M/uL  Hemoglobin: 12.1 g/dL  Hematocrit: 37.2 %  Mean Cell Volume: 96.4 fl  Mean Cell Hemoglobin: 31.3 pg  Mean Cell Hemoglobin Conc: 32.5 gm/dL  Red Cell Distrib Width: 13.4 %  Platelet Count - Automated: 187 K/uL    Prothrombin Time and INR, Plasma in AM (03.11.24 @ 05:28)   Prothrombin Time, Plasma: 9.6 sec  INR: 0.91:     < from: Xray Chest 1 View- PORTABLE-Routine (Xray Chest 1 View- PORTABLE-Routine in AM.) (03.11.24 @ 07:16) >    INTERPRETATION:    Heart size is not accurately evaluated on this image. The thoracic aorta   is calcified.  The lungs are clear.  No pleural effusion or pneumothorax is seen.  There is scoliosis of the spine with osteoarthritic degenerative change.  There is incompletely imaged cervical spine orthopedic hardware.  There are abdominal surgical clips.    < end of copied text >    < from: CT Wrist No Cont, Right (03.10.24 @ 21:30) >    IMPRESSION:    No acute displaced fracture.< from: CT Angio Head w/ IV Cont (03.08.24 @ 16:52) >      IMPRESSION:    CTA brain:  No flow-limiting stenosis or vascular aneurysm. No AVM.    Venous system is well opacified, no evidence for venous sinus or cortical   vein thrombosis.    CTA neck:  No flow-limiting stenosis, evidence for arterial dissection, or vascular   aneurysm.    < end of copied text >    < from: MR Thoracic Spine No Cont (03.08.24 @ 15:08) >    IMPRESSION:  MR cervical spine: Anterior inferior corner fractures at C5 and C6 are   better seen on the CT of the cervical spine seen from earlier in the same   day. There is associated prominent prevertebral edema from C2 to C5. No   definite evidence of ligamentous injury. No traumatic subluxation.    Advanced multilevel degenerative changes contribute to high-grade   multilevel central canal stenosis as detailed above. Notably there is   severe central canal stenosis and cord compression at C3-C4, C4-C5 and   C5-C6. No cervical cord signal abnormality.    MR thoracic spine: No fracture or acute traumatic malalignment.    MR lumbar spine: Chronic mild compression deformity at L1. Degenerative   changes as described.< from: TTE Limited W or WO Ultrasound Enhancing Agent (03.08.24 @ 19:26) >       CONCLUSIONS:      1. Left ventricular cavity is normal in size. Left ventricular wall thickness is normal. There are no regional wall motion abnormalities seen.   2. The right ventricle is not well visualized. probably normal systolic function.   3. No pericardial effusion seen.   4. Left ventricular endocardium is not well visualized; however, the left ventricular systolic function appears grossly normal.    < end of copied text >

## 2024-03-16 NOTE — PATIENT PROFILE ADULT - FALL HARM RISK - HARM RISK INTERVENTIONS
Assistance with ambulation/Assistance OOB with selected safe patient handling equipment/Communicate Risk of Fall with Harm to all staff/Monitor for mental status changes/Monitor gait and stability/Reinforce activity limits and safety measures with patient and family/Reorient to person, place and time as needed/Review medications for side effects contributing to fall risk/Sit up slowly, dangle for a short time, stand at bedside before walking/Tailored Fall Risk Interventions/Use of alarms - bed, chair and/or voice tab/Visual Cue: Yellow wristband and red socks/Bed in lowest position, wheels locked, appropriate side rails in place/Call bell, personal items and telephone in reach/Instruct patient to call for assistance before getting out of bed or chair/Non-slip footwear when patient is out of bed/[place] to call system/Physically safe environment - no spills, clutter or unnecessary equipment/Purposeful Proactive Rounding/Room/bathroom lighting operational, light cord in reach

## 2024-03-16 NOTE — PROGRESS NOTE ADULT - PROBLEM SELECTOR PLAN 1
Patient s/p cervical decompression  tolerated the procedure well  PO as tolerated  To start physical therapy   DVT and GI prophylaxis.
Patient s/p cervical decompression  tolerated the procedure well  PO as tolerated  continue physical therapy   DVT and GI prophylaxis.  Patient moved to [facility] for further care   steroids as per ortho  follow output from drains
Patient s/p cervical decompression  tolerated the procedure well  PO as tolerated  continue physical therapy   DVT and GI prophylaxis.  Patient moved to [facility] for further care   steroids as per ortho  Drain DCed
Patient s/p cervical decompression  tolerated the procedure well  PO as tolerated  continue physical therapy   DVT and GI prophylaxis.  continue q1h neuro checks  steroids as per ortho  follow output from drains
Patient scheduled for cervical decompression  No contraindication to scheduled procedure  NPO after MN   DVT and GI prophylaxis.
Patient s/p cervical decompression  tolerated the procedure well  PO as tolerated  To start physical therapy   DVT and GI prophylaxis.  continue q1h neuro checks  steroids as per ortho
Patient s/p cervical decompression  tolerated the procedure well  PO as tolerated  continue physical therapy   DVT and GI prophylaxis.  Patient moved to [facility] for further care   steroids as per ortho  Drain to be DCed
Patient s/p cervical decompression  tolerated the procedure well  PO as tolerated  continue physical therapy   DVT and GI prophylaxis.  Patient moved to [facility] for further care   steroids as per ortho  Drain DCed

## 2024-03-16 NOTE — PROGRESS NOTE ADULT - PROBLEM SELECTOR PROBLEM 1
Stenosis of cervical spine with myelopathy

## 2024-03-16 NOTE — PROGRESS NOTE ADULT - PROVIDER SPECIALTY LIST ADULT
Internal Medicine
Orthopedics
Pain Medicine
Plastic Surgery
SICU
SICU
Orthopedics
SICU
Orthopedics
Orthopedics
SICU
Surgery
Surgery
Orthopedics
Internal Medicine

## 2024-03-17 LAB
ALBUMIN SERPL ELPH-MCNC: 2.8 G/DL — LOW (ref 3.3–5)
ALP SERPL-CCNC: 75 U/L — SIGNIFICANT CHANGE UP (ref 40–120)
ALT FLD-CCNC: 32 U/L — SIGNIFICANT CHANGE UP (ref 10–45)
ANION GAP SERPL CALC-SCNC: 12 MMOL/L — SIGNIFICANT CHANGE UP (ref 5–17)
AST SERPL-CCNC: 26 U/L — SIGNIFICANT CHANGE UP (ref 10–40)
BASOPHILS # BLD AUTO: 0.06 K/UL — SIGNIFICANT CHANGE UP (ref 0–0.2)
BASOPHILS NFR BLD AUTO: 0.6 % — SIGNIFICANT CHANGE UP (ref 0–2)
BILIRUB SERPL-MCNC: 0.8 MG/DL — SIGNIFICANT CHANGE UP (ref 0.2–1.2)
BUN SERPL-MCNC: 24 MG/DL — HIGH (ref 7–23)
CALCIUM SERPL-MCNC: 9 MG/DL — SIGNIFICANT CHANGE UP (ref 8.4–10.5)
CHLORIDE SERPL-SCNC: 99 MMOL/L — SIGNIFICANT CHANGE UP (ref 96–108)
CO2 SERPL-SCNC: 26 MMOL/L — SIGNIFICANT CHANGE UP (ref 22–31)
CREAT SERPL-MCNC: 0.81 MG/DL — SIGNIFICANT CHANGE UP (ref 0.5–1.3)
EGFR: 82 ML/MIN/1.73M2 — SIGNIFICANT CHANGE UP
EOSINOPHIL # BLD AUTO: 0.27 K/UL — SIGNIFICANT CHANGE UP (ref 0–0.5)
EOSINOPHIL NFR BLD AUTO: 2.5 % — SIGNIFICANT CHANGE UP (ref 0–6)
GLUCOSE SERPL-MCNC: 71 MG/DL — SIGNIFICANT CHANGE UP (ref 70–99)
HCT VFR BLD CALC: 37.5 % — SIGNIFICANT CHANGE UP (ref 34.5–45)
HGB BLD-MCNC: 11.8 G/DL — SIGNIFICANT CHANGE UP (ref 11.5–15.5)
IMM GRANULOCYTES NFR BLD AUTO: 0.9 % — SIGNIFICANT CHANGE UP (ref 0–0.9)
LYMPHOCYTES # BLD AUTO: 1.38 K/UL — SIGNIFICANT CHANGE UP (ref 1–3.3)
LYMPHOCYTES # BLD AUTO: 12.8 % — LOW (ref 13–44)
MCHC RBC-ENTMCNC: 31.5 GM/DL — LOW (ref 32–36)
MCHC RBC-ENTMCNC: 31.5 PG — SIGNIFICANT CHANGE UP (ref 27–34)
MCV RBC AUTO: 100 FL — SIGNIFICANT CHANGE UP (ref 80–100)
MONOCYTES # BLD AUTO: 1.26 K/UL — HIGH (ref 0–0.9)
MONOCYTES NFR BLD AUTO: 11.7 % — SIGNIFICANT CHANGE UP (ref 2–14)
NEUTROPHILS # BLD AUTO: 7.68 K/UL — HIGH (ref 1.8–7.4)
NEUTROPHILS NFR BLD AUTO: 71.5 % — SIGNIFICANT CHANGE UP (ref 43–77)
NRBC # BLD: 0 /100 WBCS — SIGNIFICANT CHANGE UP (ref 0–0)
PLATELET # BLD AUTO: 276 K/UL — SIGNIFICANT CHANGE UP (ref 150–400)
POTASSIUM SERPL-MCNC: 4 MMOL/L — SIGNIFICANT CHANGE UP (ref 3.5–5.3)
POTASSIUM SERPL-SCNC: 4 MMOL/L — SIGNIFICANT CHANGE UP (ref 3.5–5.3)
PROT SERPL-MCNC: 6.3 G/DL — SIGNIFICANT CHANGE UP (ref 6–8.3)
RBC # BLD: 3.75 M/UL — LOW (ref 3.8–5.2)
RBC # FLD: 12.9 % — SIGNIFICANT CHANGE UP (ref 10.3–14.5)
SODIUM SERPL-SCNC: 137 MMOL/L — SIGNIFICANT CHANGE UP (ref 135–145)
WBC # BLD: 10.75 K/UL — HIGH (ref 3.8–10.5)
WBC # FLD AUTO: 10.75 K/UL — HIGH (ref 3.8–10.5)

## 2024-03-17 PROCEDURE — 99232 SBSQ HOSP IP/OBS MODERATE 35: CPT

## 2024-03-17 PROCEDURE — 99223 1ST HOSP IP/OBS HIGH 75: CPT

## 2024-03-17 RX ADMIN — PREGABALIN 1000 MICROGRAM(S): 225 CAPSULE ORAL at 12:30

## 2024-03-17 RX ADMIN — Medication 5 MILLIGRAM(S): at 05:21

## 2024-03-17 RX ADMIN — OXYCODONE HYDROCHLORIDE 10 MILLIGRAM(S): 5 TABLET ORAL at 22:32

## 2024-03-17 RX ADMIN — OXYCODONE HYDROCHLORIDE 10 MILLIGRAM(S): 5 TABLET ORAL at 13:22

## 2024-03-17 RX ADMIN — Medication 975 MILLIGRAM(S): at 15:03

## 2024-03-17 RX ADMIN — LIDOCAINE 1 PATCH: 4 CREAM TOPICAL at 12:31

## 2024-03-17 RX ADMIN — OXYCODONE HYDROCHLORIDE 10 MILLIGRAM(S): 5 TABLET ORAL at 01:35

## 2024-03-17 RX ADMIN — ATORVASTATIN CALCIUM 10 MILLIGRAM(S): 80 TABLET, FILM COATED ORAL at 22:30

## 2024-03-17 RX ADMIN — OXYCODONE HYDROCHLORIDE 10 MILLIGRAM(S): 5 TABLET ORAL at 23:30

## 2024-03-17 RX ADMIN — Medication 3 MILLIGRAM(S): at 22:31

## 2024-03-17 RX ADMIN — ENOXAPARIN SODIUM 40 MILLIGRAM(S): 100 INJECTION SUBCUTANEOUS at 20:06

## 2024-03-17 RX ADMIN — Medication 975 MILLIGRAM(S): at 13:12

## 2024-03-17 RX ADMIN — TIZANIDINE 4 MILLIGRAM(S): 4 TABLET ORAL at 15:25

## 2024-03-17 RX ADMIN — TIZANIDINE 4 MILLIGRAM(S): 4 TABLET ORAL at 05:21

## 2024-03-17 RX ADMIN — Medication 975 MILLIGRAM(S): at 05:21

## 2024-03-17 RX ADMIN — OXYCODONE HYDROCHLORIDE 10 MILLIGRAM(S): 5 TABLET ORAL at 12:30

## 2024-03-17 RX ADMIN — NALOXEGOL OXALATE 12.5 MILLIGRAM(S): 12.5 TABLET, FILM COATED ORAL at 12:30

## 2024-03-17 RX ADMIN — OXYCODONE HYDROCHLORIDE 10 MILLIGRAM(S): 5 TABLET ORAL at 02:30

## 2024-03-17 RX ADMIN — OXYCODONE HYDROCHLORIDE 10 MILLIGRAM(S): 5 TABLET ORAL at 09:24

## 2024-03-17 RX ADMIN — Medication 975 MILLIGRAM(S): at 22:31

## 2024-03-17 RX ADMIN — TIZANIDINE 4 MILLIGRAM(S): 4 TABLET ORAL at 20:06

## 2024-03-17 RX ADMIN — PANTOPRAZOLE SODIUM 40 MILLIGRAM(S): 20 TABLET, DELAYED RELEASE ORAL at 05:22

## 2024-03-17 RX ADMIN — POLYETHYLENE GLYCOL 3350 17 GRAM(S): 17 POWDER, FOR SOLUTION ORAL at 12:31

## 2024-03-17 RX ADMIN — OXYCODONE HYDROCHLORIDE 10 MILLIGRAM(S): 5 TABLET ORAL at 08:20

## 2024-03-17 RX ADMIN — Medication 1 TABLET(S): at 12:30

## 2024-03-17 RX ADMIN — OXYCODONE HYDROCHLORIDE 10 MILLIGRAM(S): 5 TABLET ORAL at 18:24

## 2024-03-17 NOTE — PROGRESS NOTE ADULT - SUBJECTIVE AND OBJECTIVE BOX
Cc: Gait dysfunction    HPI: Patient seen and examined at bedside. No acute events overnight.   Pain controlled, no chest pain, no N/V, no Fevers/Chills. No other new ROS  Has been tolerating rehabilitation program.    acetaminophen     Tablet .. 975 milliGRAM(s) Oral every 8 hours  atorvastatin 10 milliGRAM(s) Oral at bedtime  benzocaine/menthol Lozenge 1 Lozenge Oral every 2 hours PRN  bisacodyl Suppository 10 milliGRAM(s) Rectal daily PRN  cyanocobalamin 1000 MICROGram(s) Oral daily  enoxaparin Injectable 40 milliGRAM(s) SubCutaneous every 24 hours  lidocaine   4% Patch 1 Patch Transdermal daily  lidocaine   4% Patch 1 Patch Transdermal daily  melatonin 3 milliGRAM(s) Oral at bedtime  multivitamin 1 Tablet(s) Oral daily  naloxegol 12.5 milliGRAM(s) Oral daily  oxyCODONE    IR 10 milliGRAM(s) Oral every 4 hours PRN  oxyCODONE    IR 5 milliGRAM(s) Oral every 4 hours PRN  pantoprazole    Tablet 40 milliGRAM(s) Oral before breakfast  polyethylene glycol 3350 17 Gram(s) Oral daily  senna 2 Tablet(s) Oral at bedtime  tiZANidine 4 milliGRAM(s) Oral three times a day PRN  traMADol 50 milliGRAM(s) Oral every 6 hours PRN      T(C): 36.9 (03-16-24 @ 20:10), Max: 36.9 (03-16-24 @ 20:10)  HR: 70 (03-17-24 @ 08:19) (67 - 74)  BP: 93/60 (03-17-24 @ 08:19) (93/60 - 118/80)  RR: 17 (03-17-24 @ 08:19) (16 - 17)  SpO2: 96% (03-17-24 @ 08:19) (96% - 97%)    In NAD  HEENT- EOMI, in C-collar  Heart- S1S2  Lungs- CTA bl.  Abd- + BS, NT  Ext- No calf pain  Neuro- Exam unchanged    CBC 3/17/24 reviewed  CMP 3/17/24 reviewed  CBC 3/11/24 reviewed        Imp: Patient with diagnosis of central cord syndrome admitted for comprehensive acute rehabilitation.    Plan:  - Continue PT/OT/SLP as indicated  - DVT prophylaxis  - Skin- Turn q2h, check skin daily  - Continue current medications  -Active issues-   HTN - BP low this AM, stopped enalapril  Pain - Continue Lidocaine patches, Tylenol, Oxycodone PRN, Tizanidine PRN, Tramadol PRN  - Patient is stable to continue current rehabilitation program.

## 2024-03-17 NOTE — CONSULT NOTE ADULT - ASSESSMENT
63 year old female with past medical history of Anders Y bypass, HTN, HLD, depression who was admitted for new onset weakness in BUE after a fall. Found to have central cord syndrome from stenosis in setting of C5 and 6 fractures s/p Decompression and posterior fusion of the cervical spine.  Patient now admitted for a multidisciplinary rehab program. 03-16-24 @ 12:56    #Status Post Fall w/ C5-C6 Fracture   #Central Cord Syndrome from Severe C3-6 Canal Stenosis  -s/p C3-7 decompression w/ posterior spinal fusion and muscle flap reconstruction on 3/9  -Patient to remain in C collar at all times   -Keep surgical dressing clean and dry, have dressing/sutures removed and steri-strips applied post operative day#13 - 3/22  -Spine and Fall precaution  -Pain control and bowel regimen per rehab team   -Comprehensive rehab program with PT/OT     #Syncope  -Unclear cause   -CTH/CTA Head and Neck were unremarkable  -Echo 3/8 showed grossly normal EF, no significant valvular disease   -s/p telemetry monitoring during her hospitalization   -Will monitor     #HTN  -BP soft 93/60  -Will hold enalapril 5mg daily for now  -Monitor BP    #HLD  -Continue atorvastatin 10mg daily    #s/p Anders en Y bypass  #GERD  -PPI     #DVT prophylaxis:   - Lovenox SC

## 2024-03-17 NOTE — CONSULT NOTE ADULT - REASON FOR ADMISSION
Central Cord syndrome from severe C3-6 canal stenosis s/p C3-7 decompression w/ posterior spinal fusion and muscle flap reconstruction

## 2024-03-17 NOTE — CONSULT NOTE ADULT - SUBJECTIVE AND OBJECTIVE BOX
HPI  63 year old woman with a PMhx of RYGB, hypertension, hyperlipidemia, depression, social EtOH use, who presented after syncope and fall. She lost consciousness without warning or without aura. This has never happened in the past. She woke up some time later (unsure how long) around 9pm but felt too weak to steady herself or get up. She laid on the carpeted floor from 9pm->7:30am before she regained strength to take herself downstairs to call for help. She reports bilateral upper extremity pain, mostly located in the shoulders, R thumb pain, upper neck pain. She has new onset bilateral upper extremity weakness, R>L She has remained A&O since the event. Workup demonstrated c5-6 cervical fractures, no other injuries. Patient admitted to [facility] on 3/8/24 for surgical management of a cervical spine injury following syncopal fall. CT of the cervical spine completed revealing acute anterior inferior endplate vertebral body fractures C5 and C6. MR cervical spine confirmed central cord syndrome with C3-6 severe canal stenosis. Following medical and cardiology clearance and optimization, patient taken to the OR and underwent C3-C7 decompression, posterior spinal fusion with muscle flap reconstruction on 3/9/2024 with Dr. Layton.   Plastic surgery consulted intraop to evaluate patient for muscle flap reconstruction of posterior spine wound given wide exposure of spine structures, need for bilateral multilevel hardware placement, use of bone graft requiring healthy vascularized muscle flap coverage of exposed vital structures and extensive foreign body. Patient tolerated procedure well. Patient was placed in hard cervical collar post op. Drain placed intraoperatively by plastics team, outputs monitored q shift. Drain pulled on 3/14/24 successfully. Patient transferred to the SICU post operatively for q1 neuro checks with eventual downgrade to the floor on 3/13/24. Patient placed on PCA for acute pain control, which was discontinued on 3/10/24 with transition to PO analgesics. CT of the wrist completed due to wrist pain to evaluate for scaphoid fx: negative for acute fx. Patient was evaluated postoperatively by physical and occupational therapists for weight bearing as tolerated ambulation in Cervical collar, and advised that patient would benefit from admission to rehab facility.   Physical medicine and rehab team evaluated and agreed with the recommendations. Discharge and Orthopedic Care instructions were delineated in the Discharge Plan and reviewed with the patient. All medications were delineated in the medication reconciliation tool and key points were reviewed with the patient. They were deemed stable from an Orthopedic & medical standpoint for discharge on 3/15/24 with no new neuro deficits.     Patient seen and examined at bedside. Endorses bilateral shoulder and upper back spasms. Rest of ROS is negative.    ALLERGIES:  No Known Allergies    PAST MEDICAL HISTORY:  History of hypertension   Hypercholesterolemia     PAST SURGICAL HISTORY:  H/O gastric bypass     Personal History of Gastric Bypass     S/P Cholecystectomy     S/P Hernia Repair ventral hernia.     Social History:  · Substance use	Unable to obtain  · Social History (marital status, living situation, occupation, and sexual history)	Smoking - Denied  EtOH - Denied   Drugs - Denied     Lives w/ spouse in a townhouse w/ 2 JN  PTA: Independent in ADLs and ambulation     CURRENT FUNCTIONAL STATUS  Bed Mobility: Simona-CG   Transfers: min A  Gait: Simona          MEDICATIONS  (STANDING):  acetaminophen     Tablet .. 975 milliGRAM(s) Oral every 8 hours  atorvastatin 10 milliGRAM(s) Oral at bedtime  cyanocobalamin 1000 MICROGram(s) Oral daily  enalapril 5 milliGRAM(s) Oral daily  enoxaparin Injectable 40 milliGRAM(s) SubCutaneous every 24 hours  lidocaine   4% Patch 1 Patch Transdermal daily  lidocaine   4% Patch 1 Patch Transdermal daily  melatonin 3 milliGRAM(s) Oral at bedtime  multivitamin 1 Tablet(s) Oral daily  naloxegol 12.5 milliGRAM(s) Oral daily  pantoprazole    Tablet 40 milliGRAM(s) Oral before breakfast  polyethylene glycol 3350 17 Gram(s) Oral daily  senna 2 Tablet(s) Oral at bedtime    MEDICATIONS  (PRN):  benzocaine/menthol Lozenge 1 Lozenge Oral every 2 hours PRN Sore Throat  bisacodyl Suppository 10 milliGRAM(s) Rectal daily PRN Constipation  oxyCODONE    IR 5 milliGRAM(s) Oral every 4 hours PRN Moderate Pain (4 - 6)  oxyCODONE    IR 10 milliGRAM(s) Oral every 4 hours PRN Severe Pain (7 - 10)  tiZANidine 4 milliGRAM(s) Oral three times a day PRN Muscle Spasm  traMADol 50 milliGRAM(s) Oral every 6 hours PRN Mild Pain (1 - 3)    Vital Signs Last 24 Hrs  T(F): 98.4 (16 Mar 2024 20:10), Max: 98.4 (16 Mar 2024 20:10)  HR: 70 (17 Mar 2024 08:19) (67 - 74)  BP: 93/60 (17 Mar 2024 08:19) (93/60 - 118/80)  RR: 17 (17 Mar 2024 08:19) (16 - 17)  SpO2: 96% (17 Mar 2024 08:19) (96% - 97%)  I&O's Summary    BMI (kg/m2): 26.3 (03-16-24 @ 17:18)    PHYSICAL EXAM:  GENERAL: NAD  HEENT: NCAT  CHEST/LUNG: Clear to percussion bilaterally; No rales, rhonchi, wheezing  HEART: Regular rate and rhythm; No murmurs  ABDOMEN: Soft, Nontender, Nondistended; Bowel sounds present  MUSCULOSKELETAL/EXTREMITIES:  2+ Peripheral Pulses, No LE edema  PSYCH: Appropriate affect  NEURO: Alert & Oriented    I personally reviewed the below data/images/labs:    LABS:                        11.8   10.75 )-----------( 276      ( 17 Mar 2024 06:16 )             37.5       03-17    137  |  99  |  24  ----------------------------<  71  4.0   |  26  |  0.81    Ca    9.0      17 Mar 2024 06:16    TPro  6.3  /  Alb  2.8  /  TBili  0.8  /  DBili  x   /  AST  26  /  ALT  32  /  AlkPhos  75  03-17    Urinalysis Basic - ( 17 Mar 2024 06:16 )    Color: x / Appearance: x / SG: x / pH: x  Gluc: 71 mg/dL / Ketone: x  / Bili: x / Urobili: x   Blood: x / Protein: x / Nitrite: x   Leuk Esterase: x / RBC: x / WBC x   Sq Epi: x / Non Sq Epi: x / Bacteria: x    Consultant(s) Notes Reviewed:   Care Discused with Consultants/Other Providers:  Imaging Personally Reviewed:        HPI  63 year old woman with a PMHx of RYGB, hypertension, hyperlipidemia, depression, social EtOH use, who presented after syncope and fall. She lost consciousness without warning or without aura. This has never happened in the past. She woke up some time later (unsure how long) around 9pm but felt too weak to steady herself or get up. She laid on the carpeted floor from 9pm->7:30am before she regained strength to take herself downstairs to call for help. She reports bilateral upper extremity pain, mostly located in the shoulders, R thumb pain, upper neck pain. She has new onset bilateral upper extremity weakness, R>L She has remained A&O since the event. Workup demonstrated c5-6 cervical fractures, no other injuries. Patient admitted to [facility] on 3/8/24 for surgical management of a cervical spine injury following syncopal fall. CT of the cervical spine completed revealing acute anterior inferior endplate vertebral body fractures C5 and C6. MR cervical spine confirmed central cord syndrome with C3-6 severe canal stenosis. Following medical and cardiology clearance and optimization, patient taken to the OR and underwent C3-C7 decompression, posterior spinal fusion with muscle flap reconstruction on 3/9/2024 with Dr. Layton.   Plastic surgery consulted intraop to evaluate patient for muscle flap reconstruction of posterior spine wound given wide exposure of spine structures, need for bilateral multilevel hardware placement, use of bone graft requiring healthy vascularized muscle flap coverage of exposed vital structures and extensive foreign body. Patient tolerated procedure well. Patient was placed in hard cervical collar post op. Drain placed intraoperatively by plastics team, outputs monitored q shift. Drain pulled on 3/14/24 successfully. Patient transferred to the SICU post operatively for q1 neuro checks with eventual downgrade to the floor on 3/13/24. Patient placed on PCA for acute pain control, which was discontinued on 3/10/24 with transition to PO analgesics. CT of the wrist completed due to wrist pain to evaluate for scaphoid fx: negative for acute fx. Patient was evaluated postoperatively by physical and occupational therapists for weight bearing as tolerated ambulation in Cervical collar, and advised that patient would benefit from admission to rehab facility.   Physical medicine and rehab team evaluated and agreed with the recommendations. Discharge and Orthopedic Care instructions were delineated in the Discharge Plan and reviewed with the patient. All medications were delineated in the medication reconciliation tool and key points were reviewed with the patient. They were deemed stable from an Orthopedic & medical standpoint for discharge on 3/15/24 with no new neuro deficits.     Patient seen and examined at bedside. Endorses bilateral shoulder and upper back spasms. Rest of ROS is negative.    ALLERGIES:  No Known Allergies    PAST MEDICAL HISTORY:  History of hypertension   Hypercholesterolemia     PAST SURGICAL HISTORY:  H/O gastric bypass   S/P Cholecystectomy   S/P Hernia Repair ventral hernia.    Social History  Smoking - Denied  EtOH - Denied   Drugs - Denied     MEDICATIONS  (STANDING):  acetaminophen     Tablet .. 975 milliGRAM(s) Oral every 8 hours  atorvastatin 10 milliGRAM(s) Oral at bedtime  cyanocobalamin 1000 MICROGram(s) Oral daily  enalapril 5 milliGRAM(s) Oral daily  enoxaparin Injectable 40 milliGRAM(s) SubCutaneous every 24 hours  lidocaine   4% Patch 1 Patch Transdermal daily  lidocaine   4% Patch 1 Patch Transdermal daily  melatonin 3 milliGRAM(s) Oral at bedtime  multivitamin 1 Tablet(s) Oral daily  naloxegol 12.5 milliGRAM(s) Oral daily  pantoprazole    Tablet 40 milliGRAM(s) Oral before breakfast  polyethylene glycol 3350 17 Gram(s) Oral daily  senna 2 Tablet(s) Oral at bedtime    MEDICATIONS  (PRN):  benzocaine/menthol Lozenge 1 Lozenge Oral every 2 hours PRN Sore Throat  bisacodyl Suppository 10 milliGRAM(s) Rectal daily PRN Constipation  oxyCODONE    IR 5 milliGRAM(s) Oral every 4 hours PRN Moderate Pain (4 - 6)  oxyCODONE    IR 10 milliGRAM(s) Oral every 4 hours PRN Severe Pain (7 - 10)  tiZANidine 4 milliGRAM(s) Oral three times a day PRN Muscle Spasm  traMADol 50 milliGRAM(s) Oral every 6 hours PRN Mild Pain (1 - 3)    Vital Signs Last 24 Hrs  T(F): 98.4 (16 Mar 2024 20:10), Max: 98.4 (16 Mar 2024 20:10)  HR: 70 (17 Mar 2024 08:19) (67 - 74)  BP: 93/60 (17 Mar 2024 08:19) (93/60 - 118/80)  RR: 17 (17 Mar 2024 08:19) (16 - 17)  SpO2: 96% (17 Mar 2024 08:19) (96% - 97%)  I&O's Summary    BMI (kg/m2): 26.3 (03-16-24 @ 17:18)    PHYSICAL EXAM:  GENERAL: NAD  HEENT: NCAT, C collar in place  CHEST/LUNG: Clear to percussion bilaterally; No rales, rhonchi, wheezing  HEART: Regular rate and rhythm; No murmurs  ABDOMEN: Soft, Nontender, Nondistended; Bowel sounds present  MUSCULOSKELETAL/EXTREMITIES:  2+ Peripheral Pulses, No LE edema  PSYCH: Appropriate affect  NEURO: Alert & Oriented x 3, 3/5 RUE, 4/5 RLE, 5/5 LUE/LLE    I personally reviewed the below data/images/labs:    LABS:                        11.8   10.75 )-----------( 276      ( 17 Mar 2024 06:16 )             37.5       03-17    137  |  99  |  24  ----------------------------<  71  4.0   |  26  |  0.81    Ca    9.0      17 Mar 2024 06:16    TPro  6.3  /  Alb  2.8  /  TBili  0.8  /  DBili  x   /  AST  26  /  ALT  32  /  AlkPhos  75  03-17    Urinalysis Basic - ( 17 Mar 2024 06:16 )    Color: x / Appearance: x / SG: x / pH: x  Gluc: 71 mg/dL / Ketone: x  / Bili: x / Urobili: x   Blood: x / Protein: x / Nitrite: x   Leuk Esterase: x / RBC: x / WBC x   Sq Epi: x / Non Sq Epi: x / Bacteria: x    Consultant(s) Notes Reviewed:   Care Discused with Consultants/Other Providers:  Imaging Personally Reviewed:     CT Wrist No Cont, Right (03.10.24 @ 21:30)  IMPRESSION:  No acute displaced fracture.< from: CT Angio Head w/ IV Cont (03.08.24 @ 16:52) >    CTH/C Spine  No acute intracranial  hemorrhage or calvarial fracture.    C5 and C6 vertebral body ACUTE anterior inferior endplate vertebral body   displaced corner fractures. No subluxation.    Multilevel cervical spondylosis and degenerative disease and spinal   stenoses. Severe C3-4, moderate to severe C4-5 and C5-6 and moderate C6-7   central spinal stenoses. A follow-up MRI of cervical spine may prove   helpful for further evaluation.    CTA brain:  No flow-limiting stenosis or vascular aneurysm. No AVM.  Venous system is well opacified, no evidence for venous sinus or cortical   vein thrombosis.  CTA neck:  No flow-limiting stenosis, evidence for arterial dissection, or vascular   aneurysm.    MR Thoracic Spine No Cont (03.08.24 @ 15:08) >    IMPRESSION:  MR cervical spine: Anterior inferior corner fractures at C5 and C6 are   better seen on the CT of the cervical spine seen from earlier in the same   day. There is associated prominent prevertebral edema from C2 to C5. No   definite evidence of ligamentous injury. No traumatic subluxation.    Advanced multilevel degenerative changes contribute to high-grade   multilevel central canal stenosis as detailed above. Notably there is   severe central canal stenosis and cord compression at C3-C4, C4-C5 and   C5-C6. No cervical cord signal abnormality.    MR thoracic spine: No fracture or acute traumatic malalignment.    MR lumbar spine: Chronic mild compression deformity at L1. Degenerative   changes as described.< from: TTE Limited W or WO Ultrasound Enhancing Agent (03.08.24 @ 19:26) >    Echo  CONCLUSIONS:   1. Left ventricular cavity is normal in size. Left ventricular wall thickness is normal. There are no regional wall motion abnormalities seen.   2. The right ventricle is not well visualized. probably normal systolic function.   3. No pericardial effusion seen.   4. Left ventricular endocardium is not well visualized; however, the left ventricular systolic function appears grossly normal

## 2024-03-18 LAB
ALBUMIN SERPL ELPH-MCNC: 2.9 G/DL — LOW (ref 3.3–5)
ALP SERPL-CCNC: 76 U/L — SIGNIFICANT CHANGE UP (ref 40–120)
ALT FLD-CCNC: 32 U/L — SIGNIFICANT CHANGE UP (ref 10–45)
ANION GAP SERPL CALC-SCNC: 9 MMOL/L — SIGNIFICANT CHANGE UP (ref 5–17)
AST SERPL-CCNC: 29 U/L — SIGNIFICANT CHANGE UP (ref 10–40)
BASOPHILS # BLD AUTO: 0.04 K/UL — SIGNIFICANT CHANGE UP (ref 0–0.2)
BASOPHILS NFR BLD AUTO: 0.4 % — SIGNIFICANT CHANGE UP (ref 0–2)
BILIRUB SERPL-MCNC: 0.5 MG/DL — SIGNIFICANT CHANGE UP (ref 0.2–1.2)
BUN SERPL-MCNC: 20 MG/DL — SIGNIFICANT CHANGE UP (ref 7–23)
CALCIUM SERPL-MCNC: 9.1 MG/DL — SIGNIFICANT CHANGE UP (ref 8.4–10.5)
CHLORIDE SERPL-SCNC: 102 MMOL/L — SIGNIFICANT CHANGE UP (ref 96–108)
CO2 SERPL-SCNC: 28 MMOL/L — SIGNIFICANT CHANGE UP (ref 22–31)
CREAT SERPL-MCNC: 0.68 MG/DL — SIGNIFICANT CHANGE UP (ref 0.5–1.3)
EGFR: 99 ML/MIN/1.73M2 — SIGNIFICANT CHANGE UP
EOSINOPHIL # BLD AUTO: 0.26 K/UL — SIGNIFICANT CHANGE UP (ref 0–0.5)
EOSINOPHIL NFR BLD AUTO: 2.8 % — SIGNIFICANT CHANGE UP (ref 0–6)
GLUCOSE SERPL-MCNC: 87 MG/DL — SIGNIFICANT CHANGE UP (ref 70–99)
HCT VFR BLD CALC: 36.9 % — SIGNIFICANT CHANGE UP (ref 34.5–45)
HGB BLD-MCNC: 12.1 G/DL — SIGNIFICANT CHANGE UP (ref 11.5–15.5)
IMM GRANULOCYTES NFR BLD AUTO: 0.9 % — SIGNIFICANT CHANGE UP (ref 0–0.9)
LYMPHOCYTES # BLD AUTO: 1.62 K/UL — SIGNIFICANT CHANGE UP (ref 1–3.3)
LYMPHOCYTES # BLD AUTO: 17.3 % — SIGNIFICANT CHANGE UP (ref 13–44)
MCHC RBC-ENTMCNC: 31.8 PG — SIGNIFICANT CHANGE UP (ref 27–34)
MCHC RBC-ENTMCNC: 32.8 GM/DL — SIGNIFICANT CHANGE UP (ref 32–36)
MCV RBC AUTO: 97.1 FL — SIGNIFICANT CHANGE UP (ref 80–100)
MONOCYTES # BLD AUTO: 1.1 K/UL — HIGH (ref 0–0.9)
MONOCYTES NFR BLD AUTO: 11.8 % — SIGNIFICANT CHANGE UP (ref 2–14)
NEUTROPHILS # BLD AUTO: 6.24 K/UL — SIGNIFICANT CHANGE UP (ref 1.8–7.4)
NEUTROPHILS NFR BLD AUTO: 66.8 % — SIGNIFICANT CHANGE UP (ref 43–77)
NRBC # BLD: 0 /100 WBCS — SIGNIFICANT CHANGE UP (ref 0–0)
PLATELET # BLD AUTO: 349 K/UL — SIGNIFICANT CHANGE UP (ref 150–400)
POTASSIUM SERPL-MCNC: 4.3 MMOL/L — SIGNIFICANT CHANGE UP (ref 3.5–5.3)
POTASSIUM SERPL-SCNC: 4.3 MMOL/L — SIGNIFICANT CHANGE UP (ref 3.5–5.3)
PROT SERPL-MCNC: 6.6 G/DL — SIGNIFICANT CHANGE UP (ref 6–8.3)
RBC # BLD: 3.8 M/UL — SIGNIFICANT CHANGE UP (ref 3.8–5.2)
RBC # FLD: 12.8 % — SIGNIFICANT CHANGE UP (ref 10.3–14.5)
SODIUM SERPL-SCNC: 139 MMOL/L — SIGNIFICANT CHANGE UP (ref 135–145)
WBC # BLD: 9.34 K/UL — SIGNIFICANT CHANGE UP (ref 3.8–10.5)
WBC # FLD AUTO: 9.34 K/UL — SIGNIFICANT CHANGE UP (ref 3.8–10.5)

## 2024-03-18 PROCEDURE — 99232 SBSQ HOSP IP/OBS MODERATE 35: CPT

## 2024-03-18 RX ADMIN — Medication 975 MILLIGRAM(S): at 15:13

## 2024-03-18 RX ADMIN — OXYCODONE HYDROCHLORIDE 10 MILLIGRAM(S): 5 TABLET ORAL at 22:19

## 2024-03-18 RX ADMIN — Medication 975 MILLIGRAM(S): at 05:54

## 2024-03-18 RX ADMIN — Medication 975 MILLIGRAM(S): at 06:20

## 2024-03-18 RX ADMIN — OXYCODONE HYDROCHLORIDE 10 MILLIGRAM(S): 5 TABLET ORAL at 10:46

## 2024-03-18 RX ADMIN — Medication 975 MILLIGRAM(S): at 22:18

## 2024-03-18 RX ADMIN — PREGABALIN 1000 MICROGRAM(S): 225 CAPSULE ORAL at 12:01

## 2024-03-18 RX ADMIN — OXYCODONE HYDROCHLORIDE 10 MILLIGRAM(S): 5 TABLET ORAL at 02:31

## 2024-03-18 RX ADMIN — Medication 1 TABLET(S): at 12:01

## 2024-03-18 RX ADMIN — Medication 975 MILLIGRAM(S): at 14:28

## 2024-03-18 RX ADMIN — OXYCODONE HYDROCHLORIDE 10 MILLIGRAM(S): 5 TABLET ORAL at 03:30

## 2024-03-18 RX ADMIN — TIZANIDINE 4 MILLIGRAM(S): 4 TABLET ORAL at 05:53

## 2024-03-18 RX ADMIN — LIDOCAINE 1 PATCH: 4 CREAM TOPICAL at 20:52

## 2024-03-18 RX ADMIN — OXYCODONE HYDROCHLORIDE 10 MILLIGRAM(S): 5 TABLET ORAL at 06:30

## 2024-03-18 RX ADMIN — ENOXAPARIN SODIUM 40 MILLIGRAM(S): 100 INJECTION SUBCUTANEOUS at 20:51

## 2024-03-18 RX ADMIN — TIZANIDINE 4 MILLIGRAM(S): 4 TABLET ORAL at 13:42

## 2024-03-18 RX ADMIN — OXYCODONE HYDROCHLORIDE 10 MILLIGRAM(S): 5 TABLET ORAL at 18:43

## 2024-03-18 RX ADMIN — LIDOCAINE 1 PATCH: 4 CREAM TOPICAL at 20:51

## 2024-03-18 RX ADMIN — OXYCODONE HYDROCHLORIDE 10 MILLIGRAM(S): 5 TABLET ORAL at 11:46

## 2024-03-18 RX ADMIN — Medication 3 MILLIGRAM(S): at 22:19

## 2024-03-18 RX ADMIN — TIZANIDINE 4 MILLIGRAM(S): 4 TABLET ORAL at 21:00

## 2024-03-18 RX ADMIN — ATORVASTATIN CALCIUM 10 MILLIGRAM(S): 80 TABLET, FILM COATED ORAL at 22:19

## 2024-03-18 RX ADMIN — PANTOPRAZOLE SODIUM 40 MILLIGRAM(S): 20 TABLET, DELAYED RELEASE ORAL at 05:53

## 2024-03-18 RX ADMIN — OXYCODONE HYDROCHLORIDE 10 MILLIGRAM(S): 5 TABLET ORAL at 19:13

## 2024-03-18 RX ADMIN — OXYCODONE HYDROCHLORIDE 10 MILLIGRAM(S): 5 TABLET ORAL at 07:30

## 2024-03-18 NOTE — DIETITIAN NUTRITION RISK NOTIFICATION - TREATMENT: THE FOLLOWING DIET HAS BEEN RECOMMENDED
Recommend Change Diet to Regular Diet   Recommend Initiate Ensure Max 11oz PO Daily (Provides 150kcal & 30grams of Protein)   Nutrition Education Provided

## 2024-03-18 NOTE — DIETITIAN INITIAL EVALUATION ADULT - PERTINENT LABORATORY DATA
03-18    139  |  102  |  20  ----------------------------<  87  4.3   |  28  |  0.68    Ca    9.1      18 Mar 2024 07:15    TPro  6.6  /  Alb  2.9<L>  /  TBili  0.5  /  DBili  x   /  AST  29  /  ALT  32  /  AlkPhos  76  03-18

## 2024-03-18 NOTE — PROGRESS NOTE ADULT - SUBJECTIVE AND OBJECTIVE BOX
HPI:  Mrs. Ai Dumont is a 63-year-old female patient with past medical history of RYGB (Anders-en-Y gastric bypass), hypertension, hyperlipidemia, depression, social EtOH use, who presented to the ED prior to admission after an episode of syncope and fall. She lost consciousness without warning nor with a preceding aura. No prior history of similar episodes. She regained consciousness some time later (unsure about duration) around 9pm but reported feeling too weak to steady herself or get up. She laid on the carpeted floor from 9PM through 7:30AM the following morning before she regained strength to take herself downstairs and call for help. She reported bilateral upper extremity pain, mostly located in the shoulders, right thumb pain, and upper neck pain. She had new onset bilateral upper extremity weakness more profound on the right side. She has since remained alert and oriented. Workup performed demonstrated C5-C6 cervical fractures with no other injuries. Patient was admitted to [facility] on 3/8/24 for surgical management of a cervical spine injury following syncopal fall. CT of the cervical spine reported acute anterior inferior endplate vertebral body fractures of C5 and C6 vertebrae. MR of cervical spine reported advanced multilevel degenerative changes contributing to high-grade multilevel central canal stenosis noting severe central canal stenosis and cord compression at C3-C4, C4-C5 and C5-C6. Following medical and cardiology clearance and optimization, patient was taken to the OR and underwent C3-C7 decompression, posterior spinal fusion with muscle flap reconstruction on 3/9/2024 with Dr. Layton. Plastic surgery consulted intraop to evaluate patient for muscle flap reconstruction of posterior spine wound given wide exposure of spine structures, need for bilateral multilevel hardware placement, use of bone graft requiring healthy vascularized muscle flap coverage of exposed vital structures and extensive foreign body. Patient tolerated procedure well. Patient was placed in hard cervical collar post op. Drain placed intraoperatively by Plastic Surgery team with outputs to be monitored q shift. Drain pulled on 3/14/24 successfully. Patient transferred to the SICU post operatively for q1 neuro checks with eventual downgrade to the floor on 3/13/24. Patient placed on PCA for acute pain control, which was discontinued on 3/10/24 with transition to PO analgesics. CT of the wrist completed due to wrist pain to evaluate for scaphoid fx: negative for acute fx. Patient was evaluated postoperatively by physical and occupational therapists for weight bearing as tolerated ambulation in cervical collar, and advised that patient would benefit from admission to rehab facility. Physical medicine and rehab team evaluated and agreed with the recommendations. Discharge and Orthopedic Care instructions were delineated in the Discharge Plan and reviewed with the patient per documentation. All medications were delineated in the medication reconciliation tool and key points were reviewed with the patient per documentation. Patient was deemed stable from an Orthopedic & medical standpoint at prior facility and was discharge on 3/16/24 with no new reported neuro deficits to be admitted at [facility].  (16 Mar 2024 12:47)    ___________________________________________________________________________    SUBJECTIVE/ROS  Patient was seen and evaluated at bedside today.  Reported no overnight events and is in no acute distress.  Tolerated admission process and initial evaluation.  Eager to participate on the recommended rehabilitation program.  Denies any CP, SOB, PULLIAM, palpitations, fever, chills, body aches, cough, congestion, or any other symptoms at this time.   ___________________________________________________________________________    VITALS  T(C): 36.8 (03-18-24 @ 08:30), Max: 36.9 (03-17-24 @ 20:02)  HR: 73 (03-18-24 @ 08:30) (73 - 78)  BP: 104/70 (03-18-24 @ 08:30) (96/66 - 104/70)  RR: 16 (03-18-24 @ 08:30) (16 - 17)  SpO2: 98% (03-18-24 @ 08:30) (95% - 98%)  ___________________________________________________________________________    LABS                          12.1   9.34  )-----------( 349      ( 18 Mar 2024 07:15 )             36.9       03-18    139  |  102  |  20  ----------------------------<  87  4.3   |  28  |  0.68    Ca    9.1      18 Mar 2024 07:15    TPro  6.6  /  Alb  2.9<L>  /  TBili  0.5  /  DBili  x   /  AST  29  /  ALT  32  /  AlkPhos  76  03-18    ___________________________________________________________________________    MEDICATIONS  (STANDING):  acetaminophen     Tablet .. 975 milliGRAM(s) Oral every 8 hours  atorvastatin 10 milliGRAM(s) Oral at bedtime  cyanocobalamin 1000 MICROGram(s) Oral daily  enoxaparin Injectable 40 milliGRAM(s) SubCutaneous every 24 hours  lidocaine   4% Patch 1 Patch Transdermal daily  lidocaine   4% Patch 1 Patch Transdermal daily  melatonin 3 milliGRAM(s) Oral at bedtime  multivitamin 1 Tablet(s) Oral daily  naloxegol 12.5 milliGRAM(s) Oral daily  pantoprazole    Tablet 40 milliGRAM(s) Oral before breakfast  polyethylene glycol 3350 17 Gram(s) Oral daily  senna 2 Tablet(s) Oral at bedtime    MEDICATIONS  (PRN):  benzocaine/menthol Lozenge 1 Lozenge Oral every 2 hours PRN Sore Throat  bisacodyl Suppository 10 milliGRAM(s) Rectal daily PRN Constipation  oxyCODONE    IR 5 milliGRAM(s) Oral every 4 hours PRN Moderate Pain (4 - 6)  oxyCODONE    IR 10 milliGRAM(s) Oral every 4 hours PRN Severe Pain (7 - 10)  tiZANidine 4 milliGRAM(s) Oral three times a day PRN Muscle Spasm  traMADol 50 milliGRAM(s) Oral every 6 hours PRN Mild Pain (1 - 3)    ___________________________________________________________________________    PHYSICAL EXAM:    Constitutional - NAD, Comfortable  HEENT - NCAT, EOMI  Chest - Breathing comfortably, No wheezing  Cardiovascular - S1S2   Abdomen - Soft  Extremities - No C/C/E, No calf tenderness   Neurologic Exam -                    Cognitive - AAO to self, place, date, year, situation     Communication - Fluent, No dysarthria     Cranial Nerves - CN 2-12 grossly intact     Motor -                     LEFT    UE - ShAB 5/5, EF 5/5, EE 5/5, WE 5/5,  5/5                    RIGHT UE - ShAB 3/5, EF 3/5, EE 3/5, WE 3/5,  3/5                    LEFT    LE - HF 5/5, KE 5/5, DF 5/5, PF 5/5                    RIGHT LE - HF 4/5, KE 4/5, DF 4/5, PF 4/5        Sensory - slightly decreased to light touch in all 4 extremities     Reflexes - DTR Intact, No primitive reflexive     Coordination - FTN intact on L, slowed ability on R     OculoVestibular - No nystagmus    Psychiatric - Mood stable, Affect WNL    ___________________________________________________________________________

## 2024-03-18 NOTE — DIETITIAN INITIAL EVALUATION ADULT - EDUCATION DIETARY MODIFICATIONS
Education Provided on Proper Nutrition & Need for Supplementation/(2) meets goals/outcomes/verbalization

## 2024-03-18 NOTE — DIETITIAN INITIAL EVALUATION ADULT - PERTINENT MEDS FT
MEDICATIONS  (STANDING):  acetaminophen     Tablet .. 975 milliGRAM(s) Oral every 8 hours  atorvastatin 10 milliGRAM(s) Oral at bedtime  cyanocobalamin 1000 MICROGram(s) Oral daily  enoxaparin Injectable 40 milliGRAM(s) SubCutaneous every 24 hours  lidocaine   4% Patch 1 Patch Transdermal daily  lidocaine   4% Patch 1 Patch Transdermal daily  melatonin 3 milliGRAM(s) Oral at bedtime  multivitamin 1 Tablet(s) Oral daily  naloxegol 12.5 milliGRAM(s) Oral daily  pantoprazole    Tablet 40 milliGRAM(s) Oral before breakfast  polyethylene glycol 3350 17 Gram(s) Oral daily  senna 2 Tablet(s) Oral at bedtime    MEDICATIONS  (PRN):  benzocaine/menthol Lozenge 1 Lozenge Oral every 2 hours PRN Sore Throat  bisacodyl Suppository 10 milliGRAM(s) Rectal daily PRN Constipation  oxyCODONE    IR 10 milliGRAM(s) Oral every 4 hours PRN Severe Pain (7 - 10)  oxyCODONE    IR 5 milliGRAM(s) Oral every 4 hours PRN Moderate Pain (4 - 6)  tiZANidine 4 milliGRAM(s) Oral three times a day PRN Muscle Spasm  traMADol 50 milliGRAM(s) Oral every 6 hours PRN Mild Pain (1 - 3)

## 2024-03-18 NOTE — DIETITIAN INITIAL EVALUATION ADULT - NS FNS DIET ORDER
on DASH-TLC Diet w/ Thin Liquids (IDDSI Level 0)  Recommend Change Diet to Regular Diet   Recommend Initiate Ensure Max 11oz PO Daily (Provides 150kcal & 30grams of Protein)   Education Provided on Proper Nutrition & Need for Supplementation

## 2024-03-18 NOTE — DIETITIAN INITIAL EVALUATION ADULT - FACTORS AFF FOOD INTAKE
[employer] PO Intake over Last Week  Consuming Far Less than Prior to Admission to Hospital (Per Patient)/persistent lack of appetite

## 2024-03-18 NOTE — DIETITIAN INITIAL EVALUATION ADULT - ORAL INTAKE PTA/DIET HISTORY
Patient Does Not Follow Diet @Home But Tries to Limit Carbohydrate Intake  Consumes 2-3Meals a Day   Usually Cooks For Self  Does Take Vitamin/Supplements @Home (Protein Drink, Vitamin B12, Biotin)

## 2024-03-18 NOTE — DIETITIAN INITIAL EVALUATION ADULT - OTHER INFO
Initial Nutrition Assessment   63yr Old Female   Denies Food Allergy/Intolerance  Tolerates Diet Well - No Chewing/Swallowing Complications (Per Patient)  Surgical Incision on Neck & No Pressure Ulcers (as Per Nursing Flow Sheets)  No Edema Noted (as Per Nursing Flow Sheets)  No Recent Nausea/Vomiting/Diarrhea/Constipation (as Per Patient)

## 2024-03-18 NOTE — PROGRESS NOTE ADULT - SUBJECTIVE AND OBJECTIVE BOX
|-------------------------------------------|                       Subjective   |-------------------------------------------|    No events overnight  No new complaints to offer me    |------------------------------------------|                       Objective  |------------------------------------------|    Vital Signs Last 24 Hrs  T(F): 98.2 (18 Mar 2024 08:30), Max: 98.4 (17 Mar 2024 20:02)  HR: 73 (18 Mar 2024 08:30) (73 - 78)  BP: 104/70 (18 Mar 2024 08:30) (96/66 - 104/70)  RR: 16 (18 Mar 2024 08:30) (16 - 17)  SpO2: 98% (18 Mar 2024 08:30) (95% - 98%)  I&O's Summary      Examination:   General: NAD, Comfortable  Pulm: CTA BL No Rhonchi Rales or wheezes  Neck: Supple, No JVD  CVS: RRR No rubs murmurs or gallops  Abdomen: Soft, Nontender, Nondistended; No masses or organomegally  Extremities: No calf tenderness, No pitting edema  Labs:                        12.1   9.34  )-----------( 349      ( 18 Mar 2024 07:15 )             36.9       03-18    139  |  102  |  20  ----------------------------<  87  4.3   |  28  |  0.68    Ca    9.1      18 Mar 2024 07:15    TPro  6.6  /  Alb  2.9  /  TBili  0.5  /  DBili  x   /  AST  29  /  ALT  32  /  AlkPhos  76  03-18       |-------------------------------------------------|                  Assessment and plan  |-------------------------------------------------|    63F w/ Anders Y bypass, HTN, HLD, depression  Admit SP Fall, BL UE Weakness, C5/C6 Fracture  SP repair    Cervical Cord Syndrome  Severe C3-6 Canal Stenosis  -s/p C3-7 decompression w/ posterior spinal fusion and muscle flap reconstruction on 3/9  C collar   -Keep surgical dressing clean and dry, have dressing/sutures removed and steri-strips applied post operative day#13 - 3/22      Syncope  -Unclear cause   -CTH/CTA Head and Neck were unremarkable  -Echo 3/8 showed grossly normal EF, no significant valvular disease   -s/p telemetry monitoring during her hospitalization   -Will monitor     #HTN  -BP soft 93/60  -Will hold enalapril 5mg daily for now  -Monitor BP    #HLD  -Continue atorvastatin 10mg daily    #s/p Anders en Y bypass  #GERD  -PPI     #DVT prophylaxis:   - Lovenox SC

## 2024-03-18 NOTE — PROGRESS NOTE ADULT - ASSESSMENT
Mrs. Ai Dumont is a 63-year-old female patient with past medical history of RYGB (Anders-en-Y gastric bypass), hypertension, hyperlipidemia, depression, and social EtOH use who is admitted for Acute Inpatient Rehabilitation with a multidisciplinary rehab program at [facility] with functional impairments in ADLs and mobility secondary to a traumatic cervical spinal cord injury after a syncope and fall with loss of consciousness.      Traumatic cervical spinal cord injury  - Central cord syndrome  - Severe central canal stenosis and cord compression at C3-C4, C4-C5 and C5-C6 with C5 and C6 fractures  - S/P C3-7 decompression w/ posterior spinal fusion by Neurosurgery and muscle flap reconstruction by Plastic Surgery on 3/8/24       * Patient to remain in C collar at all times        * Keep surgical dressing clean and dry, have dressing/sutures removed and steri-strips applied post operative day #13 (3/22/24)  - Traumatic brain injury with prolonged loss of consciousness  - Generalized weakness - UEs > LEs  - Impaired ADLs and mobility  - Need for assistance with personal care  - Continue Comprehensive Multidisciplinary Rehab Program:       * PT to focus on BUE paresis in setting of cord compression working on strengthening of extremities as well as transfers and gait training       * OT to focus on functional deficits in setting of BUE paresis  - Pain regimen as below    HTN  - enalapril 5mg daily  - Monitor BP    HLD  - atorvastatin 10mg daily    Pain  - Tylenol scheduled, tramadol prn, oxycodone prn, prn tizanidine  - lidocaine patches bilateral shoulders  - Avoid sedating medications that may interfere with cognitive recovery    Sleep  - Maintain quiet hours and a low stim environment.   - Melatonin PRN     GI / Bowel  s/p Anders en Y bypass  - Senna qHS  - Miralax Daily  - GI ppx: protonix  - naloxegol 12.5mg daily     / Bladder  - Currently patient voids independently   - bladder scan once on admission with straight cath for >400cc.    Skin / Pressure injury assessment  - Skin assessment on admission performed   - Monitor Incisions:  posterior cervical incision site  - Turn q2 hours in bed while awake, air mattress  - nursing to monitor skin qShift  - soft heel protectors  - skin barrier cream PRN    Diet/Dysphagia assessment:  - Diet Consistency: DASH  - Supplements: b12 and MVI  - Aspiration Precautions  - Nutrition consult    DVT prophylaxis:   - Lovenox    Outpatient Follow-up:  Pasha Layton  Orthopaedic Surgery  [address] [place] [address], [address]  [place] [address]  Phone: [phone]  Fax: [phone]  Follow Up Time: 2 weeks    Code Status/Emergency Contact:  Full Code     Steven - spouse - [phone]  PCP is Dr. Beckman  Patient uses [facility] in [place] as her preferred pharmacy.   ---------------

## 2024-03-19 PROCEDURE — 99232 SBSQ HOSP IP/OBS MODERATE 35: CPT

## 2024-03-19 RX ADMIN — TIZANIDINE 4 MILLIGRAM(S): 4 TABLET ORAL at 15:28

## 2024-03-19 RX ADMIN — LIDOCAINE 1 PATCH: 4 CREAM TOPICAL at 22:28

## 2024-03-19 RX ADMIN — LIDOCAINE 1 PATCH: 4 CREAM TOPICAL at 07:40

## 2024-03-19 RX ADMIN — Medication 3 MILLIGRAM(S): at 22:50

## 2024-03-19 RX ADMIN — Medication 1 TABLET(S): at 11:42

## 2024-03-19 RX ADMIN — TIZANIDINE 4 MILLIGRAM(S): 4 TABLET ORAL at 22:31

## 2024-03-19 RX ADMIN — OXYCODONE HYDROCHLORIDE 10 MILLIGRAM(S): 5 TABLET ORAL at 10:51

## 2024-03-19 RX ADMIN — Medication 975 MILLIGRAM(S): at 05:45

## 2024-03-19 RX ADMIN — OXYCODONE HYDROCHLORIDE 10 MILLIGRAM(S): 5 TABLET ORAL at 14:56

## 2024-03-19 RX ADMIN — OXYCODONE HYDROCHLORIDE 10 MILLIGRAM(S): 5 TABLET ORAL at 09:51

## 2024-03-19 RX ADMIN — OXYCODONE HYDROCHLORIDE 10 MILLIGRAM(S): 5 TABLET ORAL at 03:59

## 2024-03-19 RX ADMIN — LIDOCAINE 1 PATCH: 4 CREAM TOPICAL at 08:41

## 2024-03-19 RX ADMIN — Medication 975 MILLIGRAM(S): at 14:15

## 2024-03-19 RX ADMIN — OXYCODONE HYDROCHLORIDE 5 MILLIGRAM(S): 5 TABLET ORAL at 18:45

## 2024-03-19 RX ADMIN — TRAMADOL HYDROCHLORIDE 50 MILLIGRAM(S): 50 TABLET ORAL at 00:48

## 2024-03-19 RX ADMIN — LIDOCAINE 1 PATCH: 4 CREAM TOPICAL at 07:41

## 2024-03-19 RX ADMIN — OXYCODONE HYDROCHLORIDE 10 MILLIGRAM(S): 5 TABLET ORAL at 13:56

## 2024-03-19 RX ADMIN — ENOXAPARIN SODIUM 40 MILLIGRAM(S): 100 INJECTION SUBCUTANEOUS at 20:20

## 2024-03-19 RX ADMIN — OXYCODONE HYDROCHLORIDE 5 MILLIGRAM(S): 5 TABLET ORAL at 05:47

## 2024-03-19 RX ADMIN — Medication 975 MILLIGRAM(S): at 22:31

## 2024-03-19 RX ADMIN — OXYCODONE HYDROCHLORIDE 10 MILLIGRAM(S): 5 TABLET ORAL at 23:40

## 2024-03-19 RX ADMIN — ATORVASTATIN CALCIUM 10 MILLIGRAM(S): 80 TABLET, FILM COATED ORAL at 22:31

## 2024-03-19 RX ADMIN — Medication 975 MILLIGRAM(S): at 13:15

## 2024-03-19 RX ADMIN — OXYCODONE HYDROCHLORIDE 10 MILLIGRAM(S): 5 TABLET ORAL at 22:49

## 2024-03-19 RX ADMIN — PANTOPRAZOLE SODIUM 40 MILLIGRAM(S): 20 TABLET, DELAYED RELEASE ORAL at 05:45

## 2024-03-19 RX ADMIN — PREGABALIN 1000 MICROGRAM(S): 225 CAPSULE ORAL at 11:42

## 2024-03-19 RX ADMIN — OXYCODONE HYDROCHLORIDE 5 MILLIGRAM(S): 5 TABLET ORAL at 06:15

## 2024-03-19 RX ADMIN — Medication 975 MILLIGRAM(S): at 23:30

## 2024-03-19 NOTE — PROGRESS NOTE ADULT - SUBJECTIVE AND OBJECTIVE BOX
|-------------------------------------------|                       Subjective   |-------------------------------------------|    No events overnight  No new complaints to offer me    |------------------------------------------|                       Objective  |------------------------------------------|    Vital Signs Last 24 Hrs  T(F): 97.5 (19 Mar 2024 08:31), Max: 98 (19 Mar 2024 06:35)  HR: 69 (19 Mar 2024 08:31) (69 - 80)  BP: 119/77 (19 Mar 2024 08:31) (112/74 - 119/77)  RR: 15 (19 Mar 2024 08:31) (14 - 15)  SpO2: 97% (19 Mar 2024 08:31) (97% - 99%)  I&O's Summary      Examination:   General: NAD, Comfortable  Pulm: CTA BL No Rhonchi Rales or wheezes  Neck: Supple, No JVD  CVS: RRR No rubs murmurs or gallops  Abdomen: Soft, Nontender, Nondistended; No masses or organomegally  Extremities: No calf tenderness, No pitting edema  Labs:                        12.1   9.34  )-----------( 349      ( 18 Mar 2024 07:15 )             36.9       03-18    139  |  102  |  20  ----------------------------<  87  4.3   |  28  |  0.68    Ca    9.1      18 Mar 2024 07:15    TPro  6.6  /  Alb  2.9  /  TBili  0.5  /  DBili  x   /  AST  29  /  ALT  32  /  AlkPhos  76  03-18       |-------------------------------------------------|                  Assessment and plan  |-------------------------------------------------|    63F w/ Anders Y bypass, HTN, HLD, depression  Admit SP Fall, BL UE Weakness, C5/C6 Fracture  SP repair    Cervical Cord Syndrome  Severe C3-6 Canal Stenosis  -s/p C3-7 decompression w/ posterior spinal fusion and muscle flap reconstruction on 3/9  C collar   -Keep surgical dressing clean and dry, have dressing/sutures removed and steri-strips applied post operative day#13 - 3/22    Syncope  -Unclear cause   -CTH/CTA Head and Neck were unremarkable  -Echo 3/8 showed grossly normal EF, no significant valvular disease   -s/p telemetry monitoring during her hospitalization   -Will monitor     HTN  Meds held due to low bp    #HLD  -Continue atorvastatin 10mg daily    #s/p Anders en Y bypass  #GERD  -PPI     #DVT prophylaxis:   - Lovenox SC

## 2024-03-19 NOTE — PROGRESS NOTE ADULT - SUBJECTIVE AND OBJECTIVE BOX
HPI:  Mrs. Ai Dumont is a 63-year-old female patient with past medical history of RYGB (Anders-en-Y gastric bypass), hypertension, hyperlipidemia, depression, social EtOH use, who presented to the ED prior to admission after an episode of syncope and fall. She lost consciousness without warning nor with a preceding aura. No prior history of similar episodes. She regained consciousness some time later (unsure about duration) around 9pm but reported feeling too weak to steady herself or get up. She laid on the carpeted floor from 9PM through 7:30AM the following morning before she regained strength to take herself downstairs and call for help. She reported bilateral upper extremity pain, mostly located in the shoulders, right thumb pain, and upper neck pain. She had new onset bilateral upper extremity weakness more profound on the right side. She has since remained alert and oriented. Workup performed demonstrated C5-C6 cervical fractures with no other injuries. Patient was admitted to [facility] on 3/8/24 for surgical management of a cervical spine injury following syncopal fall. CT of the cervical spine reported acute anterior inferior endplate vertebral body fractures of C5 and C6 vertebrae. MR of cervical spine reported advanced multilevel degenerative changes contributing to high-grade multilevel central canal stenosis noting severe central canal stenosis and cord compression at C3-C4, C4-C5 and C5-C6. Following medical and cardiology clearance and optimization, patient was taken to the OR and underwent C3-C7 decompression, posterior spinal fusion with muscle flap reconstruction on 3/9/2024 with Dr. Layton. Plastic surgery consulted intraop to evaluate patient for muscle flap reconstruction of posterior spine wound given wide exposure of spine structures, need for bilateral multilevel hardware placement, use of bone graft requiring healthy vascularized muscle flap coverage of exposed vital structures and extensive foreign body. Patient tolerated procedure well. Patient was placed in hard cervical collar post op. Drain placed intraoperatively by Plastic Surgery team with outputs to be monitored q shift. Drain pulled on 3/14/24 successfully. Patient transferred to the SICU post operatively for q1 neuro checks with eventual downgrade to the floor on 3/13/24. Patient placed on PCA for acute pain control, which was discontinued on 3/10/24 with transition to PO analgesics. CT of the wrist completed due to wrist pain to evaluate for scaphoid fx: negative for acute fx. Patient was evaluated postoperatively by physical and occupational therapists for weight bearing as tolerated ambulation in cervical collar, and advised that patient would benefit from admission to rehab facility. Physical medicine and rehab team evaluated and agreed with the recommendations. Discharge and Orthopedic Care instructions were delineated in the Discharge Plan and reviewed with the patient per documentation. All medications were delineated in the medication reconciliation tool and key points were reviewed with the patient per documentation. Patient was deemed stable from an Orthopedic & medical standpoint at prior facility and was discharge on 3/16/24 with no new reported neuro deficits to be admitted at [facility].  (16 Mar 2024 12:47)    TDD 3/29 home  ___________________________________________________________________________    SUBJECTIVE/ROS  Patient was seen and evaluated at bedside and at therapy today.  Reported no overnight events and is in no acute distress.  Continues to have upper trap pain. Using patches and has PRN zanaflex ordered.   Eager to participate on the recommended rehabilitation program.  Denies any CP, SOB, PULLIAM, palpitations, fever, chills, body aches, cough, congestion, or any other symptoms at this time.   ___________________________________________________________________________    Vital Signs Last 24 Hrs  T(C): 36.4 (19 Mar 2024 08:31), Max: 36.7 (19 Mar 2024 06:35)  T(F): 97.5 (19 Mar 2024 08:31), Max: 98 (19 Mar 2024 06:35)  HR: 69 (19 Mar 2024 08:31) (69 - 80)  BP: 119/77 (19 Mar 2024 08:31) (112/74 - 119/77)  BP(mean): --  RR: 15 (19 Mar 2024 08:31) (14 - 15)  SpO2: 97% (19 Mar 2024 08:31) (97% - 99%)    Parameters below as of 19 Mar 2024 08:31  Patient On (Oxygen Delivery Method): room air      ___________________________________________________________________________    LABS                          12.1   9.34  )-----------( 349      ( 18 Mar 2024 07:15 )             36.9       03-18    139  |  102  |  20  ----------------------------<  87  4.3   |  28  |  0.68    Ca    9.1      18 Mar 2024 07:15    TPro  6.6  /  Alb  2.9<L>  /  TBili  0.5  /  DBili  x   /  AST  29  /  ALT  32  /  AlkPhos  76  03-18    ___________________________________________________________________________    MEDICATIONS  (STANDING):  acetaminophen     Tablet .. 975 milliGRAM(s) Oral every 8 hours  atorvastatin 10 milliGRAM(s) Oral at bedtime  cyanocobalamin 1000 MICROGram(s) Oral daily  enoxaparin Injectable 40 milliGRAM(s) SubCutaneous every 24 hours  lidocaine   4% Patch 1 Patch Transdermal daily  lidocaine   4% Patch 1 Patch Transdermal daily  melatonin 3 milliGRAM(s) Oral at bedtime  multivitamin 1 Tablet(s) Oral daily  naloxegol 12.5 milliGRAM(s) Oral daily  pantoprazole    Tablet 40 milliGRAM(s) Oral before breakfast  polyethylene glycol 3350 17 Gram(s) Oral daily  senna 2 Tablet(s) Oral at bedtime    MEDICATIONS  (PRN):  benzocaine/menthol Lozenge 1 Lozenge Oral every 2 hours PRN Sore Throat  bisacodyl Suppository 10 milliGRAM(s) Rectal daily PRN Constipation  oxyCODONE    IR 5 milliGRAM(s) Oral every 4 hours PRN Moderate Pain (4 - 6)  oxyCODONE    IR 10 milliGRAM(s) Oral every 4 hours PRN Severe Pain (7 - 10)  tiZANidine 4 milliGRAM(s) Oral three times a day PRN Muscle Spasm  traMADol 50 milliGRAM(s) Oral every 6 hours PRN Mild Pain (1 - 3)    ___________________________________________________________________________    PHYSICAL EXAM:    Constitutional - NAD, Comfortable  HEENT - NCAT, EOMI  Chest - Breathing comfortably, No wheezing  Cardiovascular - S1S2   Abdomen - Soft  Extremities - No C/C/E, No calf tenderness   Neurologic Exam -                    Cognitive - AAO to self, place, date, year, situation     Communication - Fluent, No dysarthria     Cranial Nerves - CN 2-12 grossly intact     Motor -                     LEFT    UE - ShAB 5/5, EF 5/5, EE 5/5, WE 5/5,  5/5                    RIGHT UE - ShAB 3/5, EF 3/5, EE 3/5, WE 3/5,  3/5                    LEFT    LE - HF 5/5, KE 5/5, DF 5/5, PF 5/5                    RIGHT LE - HF 4/5, KE 4/5, DF 4/5, PF 4/5        Sensory - slightly decreased to light touch in all 4 extremities     Reflexes - DTR Intact, No primitive reflexive     Coordination - FTN intact on L, slowed ability on R     OculoVestibular - No nystagmus    Psychiatric - Mood stable, Affect WNL    ___________________________________________________________________________ HPI:  Mrs. Ai Dumont is a 63-year-old female patient with past medical history of RYGB (Anders-en-Y gastric bypass), hypertension, hyperlipidemia, depression, social EtOH use, who presented to the ED prior to admission after an episode of syncope and fall. She lost consciousness without warning nor with a preceding aura. No prior history of similar episodes. She regained consciousness some time later (unsure about duration) around 9pm but reported feeling too weak to steady herself or get up. She laid on the carpeted floor from 9PM through 7:30AM the following morning before she regained strength to take herself downstairs and call for help. She reported bilateral upper extremity pain, mostly located in the shoulders, right thumb pain, and upper neck pain. She had new onset bilateral upper extremity weakness more profound on the right side. She has since remained alert and oriented. Workup performed demonstrated C5-C6 cervical fractures with no other injuries. Patient was admitted to [facility] on 3/8/24 for surgical management of a cervical spine injury following syncopal fall. CT of the cervical spine reported acute anterior inferior endplate vertebral body fractures of C5 and C6 vertebrae. MR of cervical spine reported advanced multilevel degenerative changes contributing to high-grade multilevel central canal stenosis noting severe central canal stenosis and cord compression at C3-C4, C4-C5 and C5-C6. Following medical and cardiology clearance and optimization, patient was taken to the OR and underwent C3-C7 decompression, posterior spinal fusion with muscle flap reconstruction on 3/9/2024 with Dr. Layton. Plastic surgery consulted intraop to evaluate patient for muscle flap reconstruction of posterior spine wound given wide exposure of spine structures, need for bilateral multilevel hardware placement, use of bone graft requiring healthy vascularized muscle flap coverage of exposed vital structures and extensive foreign body. Patient tolerated procedure well. Patient was placed in hard cervical collar post op. Drain placed intraoperatively by Plastic Surgery team with outputs to be monitored q shift. Drain pulled on 3/14/24 successfully. Patient transferred to the SICU post operatively for q1 neuro checks with eventual downgrade to the floor on 3/13/24. Patient placed on PCA for acute pain control, which was discontinued on 3/10/24 with transition to PO analgesics. CT of the wrist completed due to wrist pain to evaluate for scaphoid fx: negative for acute fx. Patient was evaluated postoperatively by physical and occupational therapists for weight bearing as tolerated ambulation in cervical collar, and advised that patient would benefit from admission to rehab facility. Physical medicine and rehab team evaluated and agreed with the recommendations. Discharge and Orthopedic Care instructions were delineated in the Discharge Plan and reviewed with the patient per documentation. All medications were delineated in the medication reconciliation tool and key points were reviewed with the patient per documentation. Patient was deemed stable from an Orthopedic & medical standpoint at prior facility and was discharge on 3/16/24 with no new reported neuro deficits to be admitted at [facility].  (16 Mar 2024 12:47)    TDD 3/29 home  ___________________________________________________________________________    SUBJECTIVE/ROS  Patient was seen and evaluated at bedside and at therapy today.  Reported no overnight events and is in no acute distress.  Continues to have upper trap pain. Using patches and has PRN Zanaflex ordered.   Eager to participate on the recommended rehabilitation program.  Denies any CP, SOB, PULLIAM, palpitations, fever, chills, body aches, cough, congestion, or any other symptoms at this time.   ___________________________________________________________________________    Vital Signs Last 24 Hrs  T(C): 36.4 (19 Mar 2024 08:31), Max: 36.7 (19 Mar 2024 06:35)  T(F): 97.5 (19 Mar 2024 08:31), Max: 98 (19 Mar 2024 06:35)  HR: 69 (19 Mar 2024 08:31) (69 - 80)  BP: 119/77 (19 Mar 2024 08:31) (112/74 - 119/77)  RR: 15 (19 Mar 2024 08:31) (14 - 15)  SpO2: 97% (19 Mar 2024 08:31) (97% - 99%)    ___________________________________________________________________________    LABS                          12.1   9.34  )-----------( 349      ( 18 Mar 2024 07:15 )             36.9       03-18    139  |  102  |  20  ----------------------------<  87  4.3   |  28  |  0.68    Ca    9.1      18 Mar 2024 07:15    TPro  6.6  /  Alb  2.9<L>  /  TBili  0.5  /  DBili  x   /  AST  29  /  ALT  32  /  AlkPhos  76  03-18    ___________________________________________________________________________    MEDICATIONS  (STANDING):  acetaminophen     Tablet .. 975 milliGRAM(s) Oral every 8 hours  atorvastatin 10 milliGRAM(s) Oral at bedtime  cyanocobalamin 1000 MICROGram(s) Oral daily  enoxaparin Injectable 40 milliGRAM(s) SubCutaneous every 24 hours  lidocaine   4% Patch 1 Patch Transdermal daily  lidocaine   4% Patch 1 Patch Transdermal daily  melatonin 3 milliGRAM(s) Oral at bedtime  multivitamin 1 Tablet(s) Oral daily  naloxegol 12.5 milliGRAM(s) Oral daily  pantoprazole    Tablet 40 milliGRAM(s) Oral before breakfast  polyethylene glycol 3350 17 Gram(s) Oral daily  senna 2 Tablet(s) Oral at bedtime    MEDICATIONS  (PRN):  benzocaine/menthol Lozenge 1 Lozenge Oral every 2 hours PRN Sore Throat  bisacodyl Suppository 10 milliGRAM(s) Rectal daily PRN Constipation  oxyCODONE    IR 5 milliGRAM(s) Oral every 4 hours PRN Moderate Pain (4 - 6)  oxyCODONE    IR 10 milliGRAM(s) Oral every 4 hours PRN Severe Pain (7 - 10)  tiZANidine 4 milliGRAM(s) Oral three times a day PRN Muscle Spasm  traMADol 50 milliGRAM(s) Oral every 6 hours PRN Mild Pain (1 - 3)    ___________________________________________________________________________    PHYSICAL EXAM:    Constitutional - NAD, Comfortable  HEENT - NCAT, EOMI  Chest - Breathing comfortably, No wheezing  Cardiovascular - S1S2   Abdomen - Soft  Extremities - No C/C/E, No calf tenderness   Neurologic Exam -                    Cognitive - AAO to self, place, date, year, situation     Communication - Fluent, No dysarthria     Cranial Nerves - CN 2-12 grossly intact     Motor -                     LEFT    UE - ShAB 5/5, EF 5/5, EE 5/5, WE 5/5,  5/5                    RIGHT UE - ShAB 3/5, EF 3/5, EE 3/5, WE 3/5,  3/5                    LEFT    LE - HF 5/5, KE 5/5, DF 5/5, PF 5/5                    RIGHT LE - HF 4/5, KE 4/5, DF 4/5, PF 4/5        Sensory - slightly decreased to light touch in all 4 extremities     Reflexes - DTR Intact, No primitive reflexive     Coordination - FTN intact on L, slowed ability on R     OculoVestibular - No nystagmus    Psychiatric - Mood stable, Affect WNL    ___________________________________________________________________________

## 2024-03-19 NOTE — PROGRESS NOTE ADULT - ASSESSMENT
Mrs. Ai Dumont is a 63-year-old female patient with past medical history of RYGB (Anders-en-Y gastric bypass), hypertension, hyperlipidemia, depression, and social EtOH use who is admitted for Acute Inpatient Rehabilitation with a multidisciplinary rehab program at [facility] with functional impairments in ADLs and mobility secondary to a traumatic cervical spinal cord injury after a syncope and fall with loss of consciousness.      Traumatic cervical spinal cord injury  - Central cord syndrome  - Severe central canal stenosis and cord compression at C3-C4, C4-C5 and C5-C6 with C5 and C6 fractures  - S/P C3-7 decompression w/ posterior spinal fusion by Neurosurgery and muscle flap reconstruction by Plastic Surgery on 3/8/24       * Patient to remain in C collar at all times        * Keep surgical dressing clean and dry, have dressing/sutures removed and steri-strips applied post operative day #13 (3/22/24)  - Traumatic brain injury with prolonged loss of consciousness  - Generalized weakness - UEs > LEs  - Impaired ADLs and mobility  - Need for assistance with personal care  - Continue Comprehensive Multidisciplinary Rehab Program:       * PT to focus on BUE paresis in setting of cord compression working on strengthening of extremities as well as transfers and gait training       * OT to focus on functional deficits in setting of BUE paresis  - Pain regimen as below    HTN  - enalapril 5mg daily  - Monitor BP    HLD  - atorvastatin 10mg daily    Pain  - Tylenol scheduled, tramadol prn, oxycodone prn.   - Will change tizanidine to standing.   - lidocaine patches bilateral shoulders  - Avoid sedating medications that may interfere with cognitive recovery    Sleep  - Maintain quiet hours and a low stim environment.   - Melatonin PRN     GI / Bowel  s/p Anders en Y bypass  - Senna qHS  - Miralax Daily  - GI ppx: protonix  - naloxegol 12.5mg daily     / Bladder  - Currently patient voids independently   - bladder scan once on admission with straight cath for >400cc.    Skin / Pressure injury assessment  - Skin assessment on admission performed   - Monitor Incisions:  posterior cervical incision site  - Turn q2 hours in bed while awake, air mattress  - nursing to monitor skin qShift  - soft heel protectors  - skin barrier cream PRN    Diet/Dysphagia assessment:  - Diet Consistency: DASH  - Supplements: b12 and MVI  - Aspiration Precautions  - Nutrition consult    DVT prophylaxis:   - Lovenox    Outpatient Follow-up:  Pasha Layton  Orthopaedic Surgery  [address] [place] [address], [address]  [place] [address]  Phone: [phone]  Fax: [phone]  Follow Up Time: 2 weeks    Code Status/Emergency Contact:  Full Code     Steven - spouse - [phone]  PCP is Dr. Beckman  [facility] uses [facility] [place] as her preferred [facility].   ---------------

## 2024-03-20 ENCOUNTER — NON-APPOINTMENT (OUTPATIENT)
Age: 64
End: 2024-03-20

## 2024-03-20 PROCEDURE — 99232 SBSQ HOSP IP/OBS MODERATE 35: CPT

## 2024-03-20 RX ORDER — OXYCODONE HYDROCHLORIDE 5 MG/1
10 TABLET ORAL EVERY 4 HOURS
Refills: 0 | Status: DISCONTINUED | OUTPATIENT
Start: 2024-03-20 | End: 2024-03-25

## 2024-03-20 RX ORDER — HYDROMORPHONE HYDROCHLORIDE 2 MG/ML
4 INJECTION INTRAMUSCULAR; INTRAVENOUS; SUBCUTANEOUS EVERY 6 HOURS
Refills: 0 | Status: DISCONTINUED | OUTPATIENT
Start: 2024-03-20 | End: 2024-03-23

## 2024-03-20 RX ORDER — TRAMADOL HYDROCHLORIDE 50 MG/1
50 TABLET ORAL EVERY 6 HOURS
Refills: 0 | Status: DISCONTINUED | OUTPATIENT
Start: 2024-03-20 | End: 2024-03-23

## 2024-03-20 RX ADMIN — LIDOCAINE 1 PATCH: 4 CREAM TOPICAL at 11:21

## 2024-03-20 RX ADMIN — HYDROMORPHONE HYDROCHLORIDE 4 MILLIGRAM(S): 2 INJECTION INTRAMUSCULAR; INTRAVENOUS; SUBCUTANEOUS at 17:58

## 2024-03-20 RX ADMIN — HYDROMORPHONE HYDROCHLORIDE 4 MILLIGRAM(S): 2 INJECTION INTRAMUSCULAR; INTRAVENOUS; SUBCUTANEOUS at 18:30

## 2024-03-20 RX ADMIN — Medication 975 MILLIGRAM(S): at 05:29

## 2024-03-20 RX ADMIN — OXYCODONE HYDROCHLORIDE 10 MILLIGRAM(S): 5 TABLET ORAL at 12:00

## 2024-03-20 RX ADMIN — TIZANIDINE 4 MILLIGRAM(S): 4 TABLET ORAL at 14:17

## 2024-03-20 RX ADMIN — TIZANIDINE 4 MILLIGRAM(S): 4 TABLET ORAL at 22:22

## 2024-03-20 RX ADMIN — PANTOPRAZOLE SODIUM 40 MILLIGRAM(S): 20 TABLET, DELAYED RELEASE ORAL at 05:29

## 2024-03-20 RX ADMIN — LIDOCAINE 1 PATCH: 4 CREAM TOPICAL at 18:22

## 2024-03-20 RX ADMIN — OXYCODONE HYDROCHLORIDE 10 MILLIGRAM(S): 5 TABLET ORAL at 02:55

## 2024-03-20 RX ADMIN — ENOXAPARIN SODIUM 40 MILLIGRAM(S): 100 INJECTION SUBCUTANEOUS at 20:27

## 2024-03-20 RX ADMIN — Medication 975 MILLIGRAM(S): at 15:00

## 2024-03-20 RX ADMIN — OXYCODONE HYDROCHLORIDE 10 MILLIGRAM(S): 5 TABLET ORAL at 11:21

## 2024-03-20 RX ADMIN — ATORVASTATIN CALCIUM 10 MILLIGRAM(S): 80 TABLET, FILM COATED ORAL at 22:22

## 2024-03-20 RX ADMIN — Medication 975 MILLIGRAM(S): at 14:17

## 2024-03-20 RX ADMIN — Medication 3 MILLIGRAM(S): at 22:22

## 2024-03-20 RX ADMIN — LIDOCAINE 1 PATCH: 4 CREAM TOPICAL at 11:22

## 2024-03-20 RX ADMIN — LIDOCAINE 1 PATCH: 4 CREAM TOPICAL at 18:20

## 2024-03-20 RX ADMIN — PREGABALIN 1000 MICROGRAM(S): 225 CAPSULE ORAL at 11:20

## 2024-03-20 RX ADMIN — OXYCODONE HYDROCHLORIDE 10 MILLIGRAM(S): 5 TABLET ORAL at 06:55

## 2024-03-20 RX ADMIN — LIDOCAINE 1 PATCH: 4 CREAM TOPICAL at 07:21

## 2024-03-20 RX ADMIN — Medication 975 MILLIGRAM(S): at 22:22

## 2024-03-20 RX ADMIN — Medication 1 TABLET(S): at 11:21

## 2024-03-20 RX ADMIN — TIZANIDINE 4 MILLIGRAM(S): 4 TABLET ORAL at 05:29

## 2024-03-20 NOTE — PROGRESS NOTE ADULT - SUBJECTIVE AND OBJECTIVE BOX
HPI:  Mrs. Ai Dumont is a 63-year-old female patient with past medical history of RYGB (Anders-en-Y gastric bypass), hypertension, hyperlipidemia, depression, social EtOH use, who presented to the ED prior to admission after an episode of syncope and fall. She lost consciousness without warning nor with a preceding aura. No prior history of similar episodes. She regained consciousness some time later (unsure about duration) around 9pm but reported feeling too weak to steady herself or get up. She laid on the carpeted floor from 9PM through 7:30AM the following morning before she regained strength to take herself downstairs and call for help. She reported bilateral upper extremity pain, mostly located in the shoulders, right thumb pain, and upper neck pain. She had new onset bilateral upper extremity weakness more profound on the right side. She has since remained alert and oriented. Workup performed demonstrated C5-C6 cervical fractures with no other injuries. Patient was admitted to [facility] on 3/8/24 for surgical management of a cervical spine injury following syncopal fall. CT of the cervical spine reported acute anterior inferior endplate vertebral body fractures of C5 and C6 vertebrae. MR of cervical spine reported advanced multilevel degenerative changes contributing to high-grade multilevel central canal stenosis noting severe central canal stenosis and cord compression at C3-C4, C4-C5 and C5-C6. Following medical and cardiology clearance and optimization, patient was taken to the OR and underwent C3-C7 decompression, posterior spinal fusion with muscle flap reconstruction on 3/9/2024 with Dr. Layton. Plastic surgery consulted intraop to evaluate patient for muscle flap reconstruction of posterior spine wound given wide exposure of spine structures, need for bilateral multilevel hardware placement, use of bone graft requiring healthy vascularized muscle flap coverage of exposed vital structures and extensive foreign body. Patient tolerated procedure well. Patient was placed in hard cervical collar post op. Drain placed intraoperatively by Plastic Surgery team with outputs to be monitored q shift. Drain pulled on 3/14/24 successfully. Patient transferred to the SICU post operatively for q1 neuro checks with eventual downgrade to the floor on 3/13/24. Patient placed on PCA for acute pain control, which was discontinued on 3/10/24 with transition to PO analgesics. CT of the wrist completed due to wrist pain to evaluate for scaphoid fx: negative for acute fx. Patient was evaluated postoperatively by physical and occupational therapists for weight bearing as tolerated ambulation in cervical collar, and advised that patient would benefit from admission to rehab facility. Physical medicine and rehab team evaluated and agreed with the recommendations. Discharge and Orthopedic Care instructions were delineated in the Discharge Plan and reviewed with the patient per documentation. All medications were delineated in the medication reconciliation tool and key points were reviewed with the patient per documentation. Patient was deemed stable from an Orthopedic & medical standpoint at prior facility and was discharge on 3/16/24 with no new reported neuro deficits to be admitted at [facility].  (16 Mar 2024 12:47)    TDD 3/29 home  ___________________________________________________________________________    SUBJECTIVE/ROS  Patient was seen and evaluated at bedside and at therapy today.  Reported no overnight events and is in no acute distress.  Continues to have pain and analgesias was updated.   Case was discussed at Interdisciplinary Team meeting yesterday.  A tentative discharge date is outlined above.  Patient is motivated to participate on the recommended rehabilitation program.  Denies any CP, SOB, PULLIAM, palpitations, fever, chills, body aches, cough, congestion, or any other symptoms at this time.   ___________________________________________________________________________    Vital Signs Last 24 Hrs  T(C): 36.6 (20 Mar 2024 08:20), Max: 36.8 (19 Mar 2024 20:17)  T(F): 97.9 (20 Mar 2024 08:20), Max: 98.2 (19 Mar 2024 20:17)  HR: 71 (20 Mar 2024 08:20) (71 - 82)  BP: 118/80 (20 Mar 2024 08:20) (108/73 - 118/80)  RR: 16 (20 Mar 2024 08:20) (16 - 16)  SpO2: 98% (20 Mar 2024 08:20) (97% - 98%)    ___________________________________________________________________________    LABS                          12.1   9.34  )-----------( 349      ( 18 Mar 2024 07:15 )             36.9       03-18    139  |  102  |  20  ----------------------------<  87  4.3   |  28  |  0.68    Ca    9.1      18 Mar 2024 07:15    TPro  6.6  /  Alb  2.9<L>  /  TBili  0.5  /  DBili  x   /  AST  29  /  ALT  32  /  AlkPhos  76  03-18    ___________________________________________________________________________    MEDICATIONS  (STANDING):  acetaminophen     Tablet .. 975 milliGRAM(s) Oral every 8 hours  atorvastatin 10 milliGRAM(s) Oral at bedtime  cyanocobalamin 1000 MICROGram(s) Oral daily  enoxaparin Injectable 40 milliGRAM(s) SubCutaneous every 24 hours  HYDROmorphone   Tablet 4 milliGRAM(s) Oral every 6 hours  lidocaine   4% Patch 1 Patch Transdermal daily  lidocaine   4% Patch 1 Patch Transdermal daily  melatonin 3 milliGRAM(s) Oral at bedtime  multivitamin 1 Tablet(s) Oral daily  naloxegol 12.5 milliGRAM(s) Oral daily  pantoprazole    Tablet 40 milliGRAM(s) Oral before breakfast  polyethylene glycol 3350 17 Gram(s) Oral daily  senna 2 Tablet(s) Oral at bedtime  tiZANidine 4 milliGRAM(s) Oral three times a day    MEDICATIONS  (PRN):  benzocaine/menthol Lozenge 1 Lozenge Oral every 2 hours PRN Sore Throat  bisacodyl Suppository 10 milliGRAM(s) Rectal daily PRN Constipation  oxyCODONE    IR 10 milliGRAM(s) Oral every 4 hours PRN Severe Pain (7 - 10)  traMADol 50 milliGRAM(s) Oral every 6 hours PRN Mild Pain (1 - 3)    ___________________________________________________________________________    PHYSICAL EXAM:    Constitutional - NAD, Comfortable  HEENT - NCAT, EOMI  Chest - Breathing comfortably, No wheezing  Cardiovascular - S1S2   Abdomen - Soft  Extremities - No C/C/E, No calf tenderness   Neurologic Exam -                    Cognitive - AAO to self, place, date, year, situation     Communication - Fluent, No dysarthria     Cranial Nerves - CN 2-12 grossly intact     Motor -                     LEFT    UE - ShAB 5/5, EF 5/5, EE 5/5, WE 5/5,  5/5                    RIGHT UE - ShAB 3/5, EF 3/5, EE 3/5, WE 3/5,  3/5                    LEFT    LE - HF 5/5, KE 5/5, DF 5/5, PF 5/5                    RIGHT LE - HF 4/5, KE 4/5, DF 4/5, PF 4/5        Sensory - slightly decreased to light touch in all 4 extremities     Reflexes - DTR Intact, No primitive reflexive     Coordination - FTN intact on L, slowed ability on R     OculoVestibular - No nystagmus    Psychiatric - Mood stable, Affect WNL    ___________________________________________________________________________

## 2024-03-20 NOTE — PROGRESS NOTE ADULT - ASSESSMENT
Mrs. Ai Dumont is a 63-year-old female patient with past medical history of RYGB (Anders-en-Y gastric bypass), hypertension, hyperlipidemia, depression, and social EtOH use who is admitted for Acute Inpatient Rehabilitation with a multidisciplinary rehab program at [facility] with functional impairments in ADLs and mobility secondary to a traumatic cervical spinal cord injury after a syncope and fall with loss of consciousness.      Traumatic cervical spinal cord injury  - Central cord syndrome  - Severe central canal stenosis and cord compression at C3-C4, C4-C5 and C5-C6 with C5 and C6 fractures  - S/P C3-7 decompression w/ posterior spinal fusion by Neurosurgery and muscle flap reconstruction by Plastic Surgery on 3/8/24       * Patient to remain in C collar at all times        * Keep surgical dressing clean and dry, have dressing/sutures removed and steri-strips applied post operative day #13 (3/22/24)  - Traumatic brain injury with prolonged loss of consciousness  - Generalized weakness - UEs > LEs  - Impaired ADLs and mobility  - Need for assistance with personal care  - Continue Comprehensive Multidisciplinary Rehab Program:       * PT to focus on BUE paresis in setting of cord compression working on strengthening of extremities as well as transfers and gait training       * OT to focus on functional deficits in setting of BUE paresis  - Pain regimen as below    HTN  - enalapril 5mg daily  - Monitor BP    HLD  - atorvastatin 10mg daily    Pain  - Tylenol scheduled, tramadol prn, hydromorphone, and oxycodone prn.   - Will change tizanidine to standing.   - lidocaine patches bilateral shoulders  - Avoid sedating medications that may interfere with cognitive recovery    Sleep  - Maintain quiet hours and a low stim environment.   - Melatonin PRN     GI / Bowel  s/p Anders en Y bypass  - Senna qHS  - Miralax Daily  - GI ppx: protonix  - naloxegol 12.5mg daily     / Bladder  - Currently patient voids independently   - bladder scan once on admission with straight cath for >400cc.    Skin / Pressure injury assessment  - Skin assessment on admission performed   - Monitor Incisions:  posterior cervical incision site  - Turn q2 hours in bed while awake, air mattress  - nursing to monitor skin qShift  - soft heel protectors  - skin barrier cream PRN    Diet/Dysphagia assessment:  - Diet Consistency: DASH  - Supplements: b12 and MVI  - Aspiration Precautions  - Nutrition consult    DVT prophylaxis:   - Lovenox    Outpatient Follow-up:  Kinjal, Pasha Hodge  Orthopaedic Surgery  [address] [place] [address], [address]  [place] [address]  Phone: [phone]  Fax: [phone]  Follow Up Time: 2 weeks    Code Status/Emergency Contact:  Full Code     Steven - spouse - [phone]  PCP is Dr. Beckman  [facility] [place] [facility].   ---------------

## 2024-03-21 LAB
ALBUMIN SERPL ELPH-MCNC: 3.2 G/DL — LOW (ref 3.3–5)
ALP SERPL-CCNC: 74 U/L — SIGNIFICANT CHANGE UP (ref 40–120)
ALT FLD-CCNC: 38 U/L — SIGNIFICANT CHANGE UP (ref 10–45)
ANION GAP SERPL CALC-SCNC: 13 MMOL/L — SIGNIFICANT CHANGE UP (ref 5–17)
AST SERPL-CCNC: 27 U/L — SIGNIFICANT CHANGE UP (ref 10–40)
BASOPHILS # BLD AUTO: 0.09 K/UL — SIGNIFICANT CHANGE UP (ref 0–0.2)
BASOPHILS NFR BLD AUTO: 1.2 % — SIGNIFICANT CHANGE UP (ref 0–2)
BILIRUB SERPL-MCNC: 0.3 MG/DL — SIGNIFICANT CHANGE UP (ref 0.2–1.2)
BUN SERPL-MCNC: 17 MG/DL — SIGNIFICANT CHANGE UP (ref 7–23)
CALCIUM SERPL-MCNC: 9.7 MG/DL — SIGNIFICANT CHANGE UP (ref 8.4–10.5)
CHLORIDE SERPL-SCNC: 102 MMOL/L — SIGNIFICANT CHANGE UP (ref 96–108)
CO2 SERPL-SCNC: 27 MMOL/L — SIGNIFICANT CHANGE UP (ref 22–31)
CREAT SERPL-MCNC: 0.7 MG/DL — SIGNIFICANT CHANGE UP (ref 0.5–1.3)
EGFR: 98 ML/MIN/1.73M2 — SIGNIFICANT CHANGE UP
EOSINOPHIL # BLD AUTO: 0.27 K/UL — SIGNIFICANT CHANGE UP (ref 0–0.5)
EOSINOPHIL NFR BLD AUTO: 3.5 % — SIGNIFICANT CHANGE UP (ref 0–6)
GLUCOSE SERPL-MCNC: 97 MG/DL — SIGNIFICANT CHANGE UP (ref 70–99)
HCT VFR BLD CALC: 36.9 % — SIGNIFICANT CHANGE UP (ref 34.5–45)
HGB BLD-MCNC: 12 G/DL — SIGNIFICANT CHANGE UP (ref 11.5–15.5)
IMM GRANULOCYTES NFR BLD AUTO: 0.7 % — SIGNIFICANT CHANGE UP (ref 0–0.9)
LYMPHOCYTES # BLD AUTO: 1.63 K/UL — SIGNIFICANT CHANGE UP (ref 1–3.3)
LYMPHOCYTES # BLD AUTO: 21.3 % — SIGNIFICANT CHANGE UP (ref 13–44)
MCHC RBC-ENTMCNC: 31.9 PG — SIGNIFICANT CHANGE UP (ref 27–34)
MCHC RBC-ENTMCNC: 32.5 GM/DL — SIGNIFICANT CHANGE UP (ref 32–36)
MCV RBC AUTO: 98.1 FL — SIGNIFICANT CHANGE UP (ref 80–100)
MONOCYTES # BLD AUTO: 0.73 K/UL — SIGNIFICANT CHANGE UP (ref 0–0.9)
MONOCYTES NFR BLD AUTO: 9.5 % — SIGNIFICANT CHANGE UP (ref 2–14)
NEUTROPHILS # BLD AUTO: 4.9 K/UL — SIGNIFICANT CHANGE UP (ref 1.8–7.4)
NEUTROPHILS NFR BLD AUTO: 63.8 % — SIGNIFICANT CHANGE UP (ref 43–77)
NRBC # BLD: 0 /100 WBCS — SIGNIFICANT CHANGE UP (ref 0–0)
PLATELET # BLD AUTO: 401 K/UL — HIGH (ref 150–400)
POTASSIUM SERPL-MCNC: 3.9 MMOL/L — SIGNIFICANT CHANGE UP (ref 3.5–5.3)
POTASSIUM SERPL-SCNC: 3.9 MMOL/L — SIGNIFICANT CHANGE UP (ref 3.5–5.3)
PROT SERPL-MCNC: 7 G/DL — SIGNIFICANT CHANGE UP (ref 6–8.3)
RBC # BLD: 3.76 M/UL — LOW (ref 3.8–5.2)
RBC # FLD: 12.8 % — SIGNIFICANT CHANGE UP (ref 10.3–14.5)
SODIUM SERPL-SCNC: 142 MMOL/L — SIGNIFICANT CHANGE UP (ref 135–145)
WBC # BLD: 7.67 K/UL — SIGNIFICANT CHANGE UP (ref 3.8–10.5)
WBC # FLD AUTO: 7.67 K/UL — SIGNIFICANT CHANGE UP (ref 3.8–10.5)

## 2024-03-21 PROCEDURE — 99232 SBSQ HOSP IP/OBS MODERATE 35: CPT

## 2024-03-21 RX ADMIN — HYDROMORPHONE HYDROCHLORIDE 4 MILLIGRAM(S): 2 INJECTION INTRAMUSCULAR; INTRAVENOUS; SUBCUTANEOUS at 18:15

## 2024-03-21 RX ADMIN — LIDOCAINE 1 PATCH: 4 CREAM TOPICAL at 11:38

## 2024-03-21 RX ADMIN — HYDROMORPHONE HYDROCHLORIDE 4 MILLIGRAM(S): 2 INJECTION INTRAMUSCULAR; INTRAVENOUS; SUBCUTANEOUS at 11:39

## 2024-03-21 RX ADMIN — TIZANIDINE 4 MILLIGRAM(S): 4 TABLET ORAL at 14:23

## 2024-03-21 RX ADMIN — LIDOCAINE 1 PATCH: 4 CREAM TOPICAL at 11:39

## 2024-03-21 RX ADMIN — HYDROMORPHONE HYDROCHLORIDE 4 MILLIGRAM(S): 2 INJECTION INTRAMUSCULAR; INTRAVENOUS; SUBCUTANEOUS at 00:10

## 2024-03-21 RX ADMIN — OXYCODONE HYDROCHLORIDE 10 MILLIGRAM(S): 5 TABLET ORAL at 21:29

## 2024-03-21 RX ADMIN — HYDROMORPHONE HYDROCHLORIDE 4 MILLIGRAM(S): 2 INJECTION INTRAMUSCULAR; INTRAVENOUS; SUBCUTANEOUS at 12:15

## 2024-03-21 RX ADMIN — ENOXAPARIN SODIUM 40 MILLIGRAM(S): 100 INJECTION SUBCUTANEOUS at 20:28

## 2024-03-21 RX ADMIN — HYDROMORPHONE HYDROCHLORIDE 4 MILLIGRAM(S): 2 INJECTION INTRAMUSCULAR; INTRAVENOUS; SUBCUTANEOUS at 17:30

## 2024-03-21 RX ADMIN — Medication 3 MILLIGRAM(S): at 21:58

## 2024-03-21 RX ADMIN — Medication 1 TABLET(S): at 11:39

## 2024-03-21 RX ADMIN — OXYCODONE HYDROCHLORIDE 10 MILLIGRAM(S): 5 TABLET ORAL at 03:50

## 2024-03-21 RX ADMIN — Medication 975 MILLIGRAM(S): at 21:58

## 2024-03-21 RX ADMIN — PREGABALIN 1000 MICROGRAM(S): 225 CAPSULE ORAL at 11:39

## 2024-03-21 RX ADMIN — OXYCODONE HYDROCHLORIDE 10 MILLIGRAM(S): 5 TABLET ORAL at 02:50

## 2024-03-21 RX ADMIN — TIZANIDINE 4 MILLIGRAM(S): 4 TABLET ORAL at 06:12

## 2024-03-21 RX ADMIN — OXYCODONE HYDROCHLORIDE 10 MILLIGRAM(S): 5 TABLET ORAL at 20:29

## 2024-03-21 RX ADMIN — Medication 975 MILLIGRAM(S): at 14:23

## 2024-03-21 RX ADMIN — TIZANIDINE 4 MILLIGRAM(S): 4 TABLET ORAL at 21:59

## 2024-03-21 RX ADMIN — PANTOPRAZOLE SODIUM 40 MILLIGRAM(S): 20 TABLET, DELAYED RELEASE ORAL at 06:12

## 2024-03-21 RX ADMIN — HYDROMORPHONE HYDROCHLORIDE 4 MILLIGRAM(S): 2 INJECTION INTRAMUSCULAR; INTRAVENOUS; SUBCUTANEOUS at 06:12

## 2024-03-21 RX ADMIN — Medication 975 MILLIGRAM(S): at 06:12

## 2024-03-21 RX ADMIN — ATORVASTATIN CALCIUM 10 MILLIGRAM(S): 80 TABLET, FILM COATED ORAL at 21:58

## 2024-03-21 RX ADMIN — HYDROMORPHONE HYDROCHLORIDE 4 MILLIGRAM(S): 2 INJECTION INTRAMUSCULAR; INTRAVENOUS; SUBCUTANEOUS at 23:41

## 2024-03-21 NOTE — PROGRESS NOTE ADULT - SUBJECTIVE AND OBJECTIVE BOX
HPI:  Mrs. Ai Dumont is a 63-year-old female patient with past medical history of RYGB (Anders-en-Y gastric bypass), hypertension, hyperlipidemia, depression, social EtOH use, who presented to the ED prior to admission after an episode of syncope and fall. She lost consciousness without warning nor with a preceding aura. No prior history of similar episodes. She regained consciousness some time later (unsure about duration) around 9pm but reported feeling too weak to steady herself or get up. She laid on the carpeted floor from 9PM through 7:30AM the following morning before she regained strength to take herself downstairs and call for help. She reported bilateral upper extremity pain, mostly located in the shoulders, right thumb pain, and upper neck pain. She had new onset bilateral upper extremity weakness more profound on the right side. She has since remained alert and oriented. Workup performed demonstrated C5-C6 cervical fractures with no other injuries. Patient was admitted to [facility] on 3/8/24 for surgical management of a cervical spine injury following syncopal fall. CT of the cervical spine reported acute anterior inferior endplate vertebral body fractures of C5 and C6 vertebrae. MR of cervical spine reported advanced multilevel degenerative changes contributing to high-grade multilevel central canal stenosis noting severe central canal stenosis and cord compression at C3-C4, C4-C5 and C5-C6. Following medical and cardiology clearance and optimization, patient was taken to the OR and underwent C3-C7 decompression, posterior spinal fusion with muscle flap reconstruction on 3/9/2024 with Dr. Layton. Plastic surgery consulted intraop to evaluate patient for muscle flap reconstruction of posterior spine wound given wide exposure of spine structures, need for bilateral multilevel hardware placement, use of bone graft requiring healthy vascularized muscle flap coverage of exposed vital structures and extensive foreign body. Patient tolerated procedure well. Patient was placed in hard cervical collar post op. Drain placed intraoperatively by Plastic Surgery team with outputs to be monitored q shift. Drain pulled on 3/14/24 successfully. Patient transferred to the SICU post operatively for q1 neuro checks with eventual downgrade to the floor on 3/13/24. Patient placed on PCA for acute pain control, which was discontinued on 3/10/24 with transition to PO analgesics. CT of the wrist completed due to wrist pain to evaluate for scaphoid fx: negative for acute fx. Patient was evaluated postoperatively by physical and occupational therapists for weight bearing as tolerated ambulation in cervical collar, and advised that patient would benefit from admission to rehab facility. Physical medicine and rehab team evaluated and agreed with the recommendations. Discharge and Orthopedic Care instructions were delineated in the Discharge Plan and reviewed with the patient per documentation. All medications were delineated in the medication reconciliation tool and key points were reviewed with the patient per documentation. Patient was deemed stable from an Orthopedic & medical standpoint at prior facility and was discharge on 3/16/24 with no new reported neuro deficits to be admitted at [facility].  (16 Mar 2024 12:47)    TDD 3/29 home  ___________________________________________________________________________    SUBJECTIVE/ROS  Patient was seen and evaluated at bedside and at therapy today.  Reported no overnight events and is in no acute distress.  Analgesias was updated yesterday and she states her pain has improved.   Patient is motivated to participate on the recommended rehabilitation program.  Denies any CP, SOB, PULLIAM, palpitations, fever, chills, body aches, cough, congestion, or any other symptoms at this time.   ___________________________________________________________________________    Vital Signs Last 24 Hrs  T(C): 36.4 (21 Mar 2024 07:47), Max: 36.6 (20 Mar 2024 20:14)  T(F): 97.6 (21 Mar 2024 07:47), Max: 97.9 (20 Mar 2024 20:14)  HR: 71 (21 Mar 2024 07:47) (71 - 73)  BP: 90/54 (21 Mar 2024 07:47) (90/54 - 108/70)  BP(mean): --  RR: 16 (21 Mar 2024 07:47) (16 - 16)  SpO2: 94% (21 Mar 2024 07:47) (94% - 97%)    Parameters below as of 21 Mar 2024 07:47  Patient On (Oxygen Delivery Method): room air      ___________________________________________________________________________    LABS                        12.0   7.67  )-----------( 401      ( 21 Mar 2024 06:47 )             36.9     03-21    142  |  102  |  17  ----------------------------<  97  3.9   |  27  |  0.70    Ca    9.7      21 Mar 2024 06:47    TPro  7.0  /  Alb  3.2<L>  /  TBili  0.3  /  DBili  x   /  AST  27  /  ALT  38  /  AlkPhos  74  03-21      ___________________________________________________________________________    MEDICATIONS  (STANDING):  acetaminophen     Tablet .. 975 milliGRAM(s) Oral every 8 hours  atorvastatin 10 milliGRAM(s) Oral at bedtime  cyanocobalamin 1000 MICROGram(s) Oral daily  enoxaparin Injectable 40 milliGRAM(s) SubCutaneous every 24 hours  HYDROmorphone   Tablet 4 milliGRAM(s) Oral every 6 hours  lidocaine   4% Patch 1 Patch Transdermal daily  lidocaine   4% Patch 1 Patch Transdermal daily  melatonin 3 milliGRAM(s) Oral at bedtime  multivitamin 1 Tablet(s) Oral daily  naloxegol 12.5 milliGRAM(s) Oral daily  pantoprazole    Tablet 40 milliGRAM(s) Oral before breakfast  polyethylene glycol 3350 17 Gram(s) Oral daily  senna 2 Tablet(s) Oral at bedtime  tiZANidine 4 milliGRAM(s) Oral three times a day    MEDICATIONS  (PRN):  benzocaine/menthol Lozenge 1 Lozenge Oral every 2 hours PRN Sore Throat  bisacodyl Suppository 10 milliGRAM(s) Rectal daily PRN Constipation  oxyCODONE    IR 10 milliGRAM(s) Oral every 4 hours PRN Severe Pain (7 - 10)  traMADol 50 milliGRAM(s) Oral every 6 hours PRN Mild Pain (1 - 3)    ___________________________________________________________________________    PHYSICAL EXAM:    Constitutional - NAD, Comfortable  HEENT - NCAT, EOMI  Chest - Breathing comfortably, No wheezing  Cardiovascular - S1S2   Abdomen - Soft  Extremities - No C/C/E, No calf tenderness   Neurologic Exam -                    Cognitive - AAO to self, place, date, year, situation     Communication - Fluent, No dysarthria     Cranial Nerves - CN 2-12 grossly intact     Motor -                     LEFT    UE - ShAB 5/5, EF 5/5, EE 5/5, WE 5/5,  5/5                    RIGHT UE - ShAB 3/5, EF 3/5, EE 3/5, WE 3/5,  3/5                    LEFT    LE - HF 5/5, KE 5/5, DF 5/5, PF 5/5                    RIGHT LE - HF 4/5, KE 4/5, DF 4/5, PF 4/5        Sensory - slightly decreased to light touch in all 4 extremities     Reflexes - DTR Intact, No primitive reflexive     Coordination - FTN intact on L, slowed ability on R     OculoVestibular - No nystagmus    Psychiatric - Mood stable, Affect WNL    ___________________________________________________________________________ HPI:  Mrs. Ai Dumont is a 63-year-old female patient with past medical history of RYGB (Anders-en-Y gastric bypass), hypertension, hyperlipidemia, depression, social EtOH use, who presented to the ED prior to admission after an episode of syncope and fall. She lost consciousness without warning nor with a preceding aura. No prior history of similar episodes. She regained consciousness some time later (unsure about duration) around 9pm but reported feeling too weak to steady herself or get up. She laid on the carpeted floor from 9PM through 7:30AM the following morning before she regained strength to take herself downstairs and call for help. She reported bilateral upper extremity pain, mostly located in the shoulders, right thumb pain, and upper neck pain. She had new onset bilateral upper extremity weakness more profound on the right side. She has since remained alert and oriented. Workup performed demonstrated C5-C6 cervical fractures with no other injuries. Patient was admitted to [facility] on 3/8/24 for surgical management of a cervical spine injury following syncopal fall. CT of the cervical spine reported acute anterior inferior endplate vertebral body fractures of C5 and C6 vertebrae. MR of cervical spine reported advanced multilevel degenerative changes contributing to high-grade multilevel central canal stenosis noting severe central canal stenosis and cord compression at C3-C4, C4-C5 and C5-C6. Following medical and cardiology clearance and optimization, patient was taken to the OR and underwent C3-C7 decompression, posterior spinal fusion with muscle flap reconstruction on 3/9/2024 with Dr. Layton. Plastic surgery consulted intraop to evaluate patient for muscle flap reconstruction of posterior spine wound given wide exposure of spine structures, need for bilateral multilevel hardware placement, use of bone graft requiring healthy vascularized muscle flap coverage of exposed vital structures and extensive foreign body. Patient tolerated procedure well. Patient was placed in hard cervical collar post op. Drain placed intraoperatively by Plastic Surgery team with outputs to be monitored q shift. Drain pulled on 3/14/24 successfully. Patient transferred to the SICU post operatively for q1 neuro checks with eventual downgrade to the floor on 3/13/24. Patient placed on PCA for acute pain control, which was discontinued on 3/10/24 with transition to PO analgesics. CT of the wrist completed due to wrist pain to evaluate for scaphoid fx: negative for acute fx. Patient was evaluated postoperatively by physical and occupational therapists for weight bearing as tolerated ambulation in cervical collar, and advised that patient would benefit from admission to rehab facility. Physical medicine and rehab team evaluated and agreed with the recommendations. Discharge and Orthopedic Care instructions were delineated in the Discharge Plan and reviewed with the patient per documentation. All medications were delineated in the medication reconciliation tool and key points were reviewed with the patient per documentation. Patient was deemed stable from an Orthopedic & medical standpoint at prior facility and was discharge on 3/16/24 with no new reported neuro deficits to be admitted at [facility].  (16 Mar 2024 12:47)    TDD 3/29 home  ___________________________________________________________________________    SUBJECTIVE/ROS  Patient was seen and evaluated at bedside and at therapy today.  Reported no overnight events and is in no acute distress.  Analgesia updated yesterday and she states her pain has improved.   Patient is motivated to participate on the recommended rehabilitation program.  Denies any CP, SOB, PULLIAM, palpitations, fever, chills, body aches, cough, congestion, or any other symptoms at this time.   ___________________________________________________________________________    Vital Signs Last 24 Hrs  T(C): 36.4 (21 Mar 2024 07:47), Max: 36.6 (20 Mar 2024 20:14)  T(F): 97.6 (21 Mar 2024 07:47), Max: 97.9 (20 Mar 2024 20:14)  HR: 71 (21 Mar 2024 07:47) (71 - 73)  BP: 90/54 (21 Mar 2024 07:47) (90/54 - 108/70)  RR: 16 (21 Mar 2024 07:47) (16 - 16)  SpO2: 94% (21 Mar 2024 07:47) (94% - 97%)    ___________________________________________________________________________    LABS                        12.0   7.67  )-----------( 401      ( 21 Mar 2024 06:47 )             36.9     03-21    142  |  102  |  17  ----------------------------<  97  3.9   |  27  |  0.70    Ca    9.7      21 Mar 2024 06:47    TPro  7.0  /  Alb  3.2<L>  /  TBili  0.3  /  DBili  x   /  AST  27  /  ALT  38  /  AlkPhos  74  03-21      ___________________________________________________________________________    MEDICATIONS  (STANDING):  acetaminophen     Tablet .. 975 milliGRAM(s) Oral every 8 hours  atorvastatin 10 milliGRAM(s) Oral at bedtime  cyanocobalamin 1000 MICROGram(s) Oral daily  enoxaparin Injectable 40 milliGRAM(s) SubCutaneous every 24 hours  HYDROmorphone   Tablet 4 milliGRAM(s) Oral every 6 hours  lidocaine   4% Patch 1 Patch Transdermal daily  lidocaine   4% Patch 1 Patch Transdermal daily  melatonin 3 milliGRAM(s) Oral at bedtime  multivitamin 1 Tablet(s) Oral daily  naloxegol 12.5 milliGRAM(s) Oral daily  pantoprazole    Tablet 40 milliGRAM(s) Oral before breakfast  polyethylene glycol 3350 17 Gram(s) Oral daily  senna 2 Tablet(s) Oral at bedtime  tiZANidine 4 milliGRAM(s) Oral three times a day    MEDICATIONS  (PRN):  benzocaine/menthol Lozenge 1 Lozenge Oral every 2 hours PRN Sore Throat  bisacodyl Suppository 10 milliGRAM(s) Rectal daily PRN Constipation  oxyCODONE    IR 10 milliGRAM(s) Oral every 4 hours PRN Severe Pain (7 - 10)  traMADol 50 milliGRAM(s) Oral every 6 hours PRN Mild Pain (1 - 3)    ___________________________________________________________________________    PHYSICAL EXAM:    Constitutional - NAD, Comfortable  HEENT - NCAT, EOMI  Chest - Breathing comfortably, No wheezing  Cardiovascular - S1S2   Abdomen - Soft  Extremities - No C/C/E, No calf tenderness   Neurologic Exam -                    Cognitive - AAO to self, place, date, year, situation     Communication - Fluent, No dysarthria     Cranial Nerves - CN 2-12 grossly intact     Motor -                     LEFT    UE - ShAB 5/5, EF 5/5, EE 5/5, WE 5/5,  5/5                    RIGHT UE - ShAB 3/5, EF 3/5, EE 3/5, WE 3/5,  3/5                    LEFT    LE - HF 5/5, KE 5/5, DF 5/5, PF 5/5                    RIGHT LE - HF 4/5, KE 4/5, DF 4/5, PF 4/5        Sensory - slightly decreased to light touch in all 4 extremities     Reflexes - DTR Intact, No primitive reflexive     Coordination - FTN intact on L, slowed ability on R     OculoVestibular - No nystagmus    Psychiatric - Mood stable, Affect WNL    ___________________________________________________________________________

## 2024-03-21 NOTE — CHART NOTE - NSCHARTNOTEFT_GEN_A_CORE
Nutrition Follow Up Note  Hospital Course   (Per Electronic Medical Record)    Source:  Patient [X]  Medical Record [X]      Diet:   Regular Diet (IDDSI Level 7) w/ Thin Liquids (IDDSI Level 0)  Tolerates Diet Consistency Well  No Chewing/Swallowing Difficulties  No Recent Nausea, Vomiting, Diarrhea & Some Recent Constipation (as Per Patient)  Consumes % of Meals (as Per Documentation) - States Fair Intake (Per Patient)   on Ensure Max 11oz PO Daily (Provides 150kcal & 30grams of Protein)   Patient Takes Nutrition Supplement Well  Education Provided on Need for Increased Fluids/Fiber   Obtained Food Preferences from Patient    Enteral/Parenteral Nutrition: Not Applicable    Current Weight: 153.4lb on 3/20  Obtain New Weight  Obtain Weights Weekly     Pertinent Medications: MEDICATIONS  (STANDING):  acetaminophen     Tablet .. 975 milliGRAM(s) Oral every 8 hours  atorvastatin 10 milliGRAM(s) Oral at bedtime  cyanocobalamin 1000 MICROGram(s) Oral daily  enoxaparin Injectable 40 milliGRAM(s) SubCutaneous every 24 hours  HYDROmorphone   Tablet 4 milliGRAM(s) Oral every 6 hours  lidocaine   4% Patch 1 Patch Transdermal daily  lidocaine   4% Patch 1 Patch Transdermal daily  melatonin 3 milliGRAM(s) Oral at bedtime  multivitamin 1 Tablet(s) Oral daily  naloxegol 12.5 milliGRAM(s) Oral daily  pantoprazole    Tablet 40 milliGRAM(s) Oral before breakfast  polyethylene glycol 3350 17 Gram(s) Oral daily  senna 2 Tablet(s) Oral at bedtime  tiZANidine 4 milliGRAM(s) Oral three times a day    MEDICATIONS  (PRN):  benzocaine/menthol Lozenge 1 Lozenge Oral every 2 hours PRN Sore Throat  bisacodyl Suppository 10 milliGRAM(s) Rectal daily PRN Constipation  oxyCODONE    IR 10 milliGRAM(s) Oral every 4 hours PRN Severe Pain (7 - 10)  traMADol 50 milliGRAM(s) Oral every 6 hours PRN Mild Pain (1 - 3)    Pertinent Labs:  03-21 Na142 mmol/L Glu 97 mg/dL K+ 3.9 mmol/L Cr  0.70 mg/dL BUN 17 mg/dL 03-21 Alb 3.2 g/dL<L>    Skin: No Pressure Ulcers  Surgical Incision on Neck   (as Per Nursing Flow Sheet)     Edema: None Noted (as Per Documentation)     Last Bowel Movement: on 3/19    Estimated Needs:   [X] No Change Since Previous Assessment    Previous Nutrition Diagnosis:   Moderate Malnutrition     Nutrition Diagnosis is [X] Ongoing - Continues on Nutrition Supplement & Patient Takes Nutrition Supplement     New Nutrition Diagnosis: [X] Not Applicable    Interventions:   1. Education Provided on Need for Increased Fluids/Fiber   2. Recommend Continue Nutrition Plan of Care     Monitoring & Evaluation:   [X] Weights   [X] PO Intake   [X] Skin Integrity   [X] Follow Up (Per Protocol)  [X] Tolerance to Diet Prescription   [X] Other: Labs     Registered Dietitian/Nutritionist Remains Available.  Troy Chisholm RDN, CDN    Phone# [phone]

## 2024-03-21 NOTE — PROGRESS NOTE ADULT - ASSESSMENT
Mrs. Ai Dumont is a 63-year-old female patient with past medical history of RYGB (Anders-en-Y gastric bypass), hypertension, hyperlipidemia, depression, and social EtOH use who is admitted for Acute Inpatient Rehabilitation with a multidisciplinary rehab program at [facility] with functional impairments in ADLs and mobility secondary to a traumatic cervical spinal cord injury after a syncope and fall with loss of consciousness.      Traumatic cervical spinal cord injury  - Central cord syndrome  - Severe central canal stenosis and cord compression at C3-C4, C4-C5 and C5-C6 with C5 and C6 fractures  - S/P C3-7 decompression w/ posterior spinal fusion by Neurosurgery and muscle flap reconstruction by Plastic Surgery on 3/8/24       * Patient to remain in C collar at all times        * Keep surgical dressing clean and dry, have dressing/sutures removed and steri-strips applied post operative day #13 (3/22/24)  - Traumatic brain injury with prolonged loss of consciousness  - Generalized weakness - UEs > LEs  - Impaired ADLs and mobility  - Need for assistance with personal care  - Continue Comprehensive Multidisciplinary Rehab Program:       * PT to focus on BUE paresis in setting of cord compression working on strengthening of extremities as well as transfers and gait training       * OT to focus on functional deficits in setting of BUE paresis  - Pain regimen as below    HTN  - enalapril 5mg daily  - Monitor BP    HLD  - atorvastatin 10mg daily    Pain  - Tylenol scheduled, tramadol prn, hydromorphone, and oxycodone prn.   - tizanidine standing 3/20.   - lidocaine patches bilateral shoulders  - Avoid sedating medications that may interfere with cognitive recovery    Sleep  - Maintain quiet hours and a low stim environment.   - Melatonin PRN     GI / Bowel  s/p Anders en Y bypass  - Senna qHS  - Miralax Daily  - GI ppx: protonix  - naloxegol 12.5mg daily     / Bladder  - Currently patient voids independently   - bladder scan once on admission with straight cath for >400cc.    Skin / Pressure injury assessment  - Skin assessment on admission performed   - Monitor Incisions:  posterior cervical incision site  -- will plan to remove dressing and staples tomorrow 3/22.   - Turn q2 hours in bed while awake, air mattress  - nursing to monitor skin qShift  - soft heel protectors  - skin barrier cream PRN    Diet/Dysphagia assessment:  - Diet Consistency: DASH  - Supplements: b12 and MVI  - Aspiration Precautions  - Nutrition consult    DVT prophylaxis:   - Lovenox    Outpatient Follow-up:  Pasha Layton  Orthopaedic Surgery  [address], [address]  [place] [address]  Phone: [phone]  Fax: [phone]  Follow Up Time: 2 weeks    f/u Pain management    Code Status/Emergency Contact:  Full Code     Steven - spouse - [phone]  PCP is Dr. Beckman  Patient uses [facility] in [place] as her preferred pharmacy.   ---------------   Mrs. Ai Dumont is a 63-year-old female patient with past medical history of RYGB (Anders-en-Y gastric bypass), hypertension, hyperlipidemia, depression, and social EtOH use who is admitted for Acute Inpatient Rehabilitation with a multidisciplinary rehab program at [facility] with functional impairments in ADLs and mobility secondary to a traumatic cervical spinal cord injury after a syncope and fall with loss of consciousness.    Traumatic cervical spinal cord injury  - Central cord syndrome  - Severe central canal stenosis and cord compression at C3-C4, C4-C5 and C5-C6 with C5 and C6 fractures  - S/P C3-7 decompression w/ posterior spinal fusion by Neurosurgery and muscle flap reconstruction by Plastic Surgery on 3/8/24       * Patient to remain in C collar at all times        * Keep surgical dressing clean and dry, have dressing/sutures removed and steri-strips applied post operative day #13 (3/22/24)  - Traumatic brain injury with prolonged loss of consciousness  - Generalized weakness - UEs > LEs  - Impaired ADLs and mobility  - Need for assistance with personal care  - Continue Comprehensive Multidisciplinary Rehab Program:       * PT to focus on BUE paresis in setting of cord compression working on strengthening of extremities as well as transfers and gait training       * OT to focus on functional deficits in setting of BUE paresis  - Pain regimen as below    HTN  - enalapril 5mg daily  - Monitor BP    HLD  - atorvastatin 10mg daily    Pain  - Tylenol scheduled, tramadol prn, hydromorphone, and oxycodone prn.   - tizanidine standing 3/20.   - lidocaine patches bilateral shoulders  - Avoid sedating medications that may interfere with cognitive recovery    Sleep  - Maintain quiet hours and a low stim environment.   - Melatonin PRN     GI / Bowel  s/p Anders en Y bypass  - Senna qHS  - Miralax Daily  - GI ppx: protonix  - naloxegol 12.5mg daily     / Bladder  - Currently patient voids independently   - bladder scan once on admission with straight cath for >400cc.    Skin / Pressure injury assessment  - Skin assessment on admission performed   - Monitor Incisions:  posterior cervical incision site  -- will plan to remove dressing and staples tomorrow 3/22.   - Turn q2 hours in bed while awake, air mattress  - nursing to monitor skin qShift  - soft heel protectors  - skin barrier cream PRN    Diet/Dysphagia assessment:  - Diet Consistency: DASH  - Supplements: b12 and MVI  - Aspiration Precautions  - Nutrition consult    DVT prophylaxis:   - Lovenox    Outpatient Follow-up:  Pasha Layton  Orthopaedic Surgery  [address], [address]  [place] [address]  Phone: [phone]  Fax: [phone]  Follow Up Time: 2 weeks    f/u Pain management    Code Status/Emergency Contact:  Full Code     Steven - spouse - [phone]  PCP is Dr. Beckman  Patient uses [facility] in [place] as her preferred pharmacy.   ---------------

## 2024-03-22 PROCEDURE — 99232 SBSQ HOSP IP/OBS MODERATE 35: CPT

## 2024-03-22 RX ADMIN — SENNA PLUS 2 TABLET(S): 8.6 TABLET ORAL at 21:39

## 2024-03-22 RX ADMIN — LIDOCAINE 1 PATCH: 4 CREAM TOPICAL at 17:48

## 2024-03-22 RX ADMIN — ENOXAPARIN SODIUM 40 MILLIGRAM(S): 100 INJECTION SUBCUTANEOUS at 21:37

## 2024-03-22 RX ADMIN — TIZANIDINE 4 MILLIGRAM(S): 4 TABLET ORAL at 21:46

## 2024-03-22 RX ADMIN — PANTOPRAZOLE SODIUM 40 MILLIGRAM(S): 20 TABLET, DELAYED RELEASE ORAL at 05:05

## 2024-03-22 RX ADMIN — HYDROMORPHONE HYDROCHLORIDE 4 MILLIGRAM(S): 2 INJECTION INTRAMUSCULAR; INTRAVENOUS; SUBCUTANEOUS at 13:15

## 2024-03-22 RX ADMIN — Medication 3 MILLIGRAM(S): at 21:37

## 2024-03-22 RX ADMIN — HYDROMORPHONE HYDROCHLORIDE 4 MILLIGRAM(S): 2 INJECTION INTRAMUSCULAR; INTRAVENOUS; SUBCUTANEOUS at 12:54

## 2024-03-22 RX ADMIN — Medication 975 MILLIGRAM(S): at 05:31

## 2024-03-22 RX ADMIN — LIDOCAINE 1 PATCH: 4 CREAM TOPICAL at 17:49

## 2024-03-22 RX ADMIN — TIZANIDINE 4 MILLIGRAM(S): 4 TABLET ORAL at 05:05

## 2024-03-22 RX ADMIN — ATORVASTATIN CALCIUM 10 MILLIGRAM(S): 80 TABLET, FILM COATED ORAL at 21:39

## 2024-03-22 RX ADMIN — LIDOCAINE 1 PATCH: 4 CREAM TOPICAL at 12:00

## 2024-03-22 RX ADMIN — Medication 975 MILLIGRAM(S): at 21:39

## 2024-03-22 RX ADMIN — Medication 975 MILLIGRAM(S): at 15:11

## 2024-03-22 RX ADMIN — Medication 975 MILLIGRAM(S): at 22:57

## 2024-03-22 RX ADMIN — HYDROMORPHONE HYDROCHLORIDE 4 MILLIGRAM(S): 2 INJECTION INTRAMUSCULAR; INTRAVENOUS; SUBCUTANEOUS at 17:39

## 2024-03-22 RX ADMIN — PREGABALIN 1000 MICROGRAM(S): 225 CAPSULE ORAL at 12:56

## 2024-03-22 RX ADMIN — HYDROMORPHONE HYDROCHLORIDE 4 MILLIGRAM(S): 2 INJECTION INTRAMUSCULAR; INTRAVENOUS; SUBCUTANEOUS at 18:20

## 2024-03-22 RX ADMIN — HYDROMORPHONE HYDROCHLORIDE 4 MILLIGRAM(S): 2 INJECTION INTRAMUSCULAR; INTRAVENOUS; SUBCUTANEOUS at 06:45

## 2024-03-22 RX ADMIN — LIDOCAINE 1 PATCH: 4 CREAM TOPICAL at 12:55

## 2024-03-22 RX ADMIN — Medication 975 MILLIGRAM(S): at 15:00

## 2024-03-22 RX ADMIN — Medication 975 MILLIGRAM(S): at 15:45

## 2024-03-22 RX ADMIN — TIZANIDINE 4 MILLIGRAM(S): 4 TABLET ORAL at 15:11

## 2024-03-22 RX ADMIN — Medication 975 MILLIGRAM(S): at 05:05

## 2024-03-22 RX ADMIN — Medication 1 TABLET(S): at 12:54

## 2024-03-22 NOTE — PROGRESS NOTE ADULT - SUBJECTIVE AND OBJECTIVE BOX
Medicine Progress Note    Patient is a 63y old  Female who presents with a chief complaint of Traumatic cervical spinal cord injury (22 Mar 2024 13:11)      SUBJECTIVE / OVERNIGHT EVENTS:  seen and examined  Chart reviewed  No overnight events  Limb weakness improving with therapy  BM+  pain controlled with meds  No complaints    ADDITIONAL REVIEW OF SYSTEMS:  denied fever/chills/CP/SOB/cough/palpitation/dizziness/abdominal pian/nausea/vomiting/diarrhoea/constipation/dysuria/leg or calf pain/headaches.all other ROS neg    MEDICATIONS  (STANDING):  acetaminophen     Tablet .. 975 milliGRAM(s) Oral every 8 hours  atorvastatin 10 milliGRAM(s) Oral at bedtime  cyanocobalamin 1000 MICROGram(s) Oral daily  enoxaparin Injectable 40 milliGRAM(s) SubCutaneous every 24 hours  HYDROmorphone   Tablet 4 milliGRAM(s) Oral every 6 hours  lidocaine   4% Patch 1 Patch Transdermal daily  lidocaine   4% Patch 1 Patch Transdermal daily  melatonin 3 milliGRAM(s) Oral at bedtime  multivitamin 1 Tablet(s) Oral daily  naloxegol 12.5 milliGRAM(s) Oral daily  pantoprazole    Tablet 40 milliGRAM(s) Oral before breakfast  polyethylene glycol 3350 17 Gram(s) Oral daily  senna 2 Tablet(s) Oral at bedtime  tiZANidine 4 milliGRAM(s) Oral three times a day    MEDICATIONS  (PRN):  benzocaine/menthol Lozenge 1 Lozenge Oral every 2 hours PRN Sore Throat  bisacodyl Suppository 10 milliGRAM(s) Rectal daily PRN Constipation  oxyCODONE    IR 10 milliGRAM(s) Oral every 4 hours PRN Severe Pain (7 - 10)  traMADol 50 milliGRAM(s) Oral every 6 hours PRN Mild Pain (1 - 3)    CAPILLARY BLOOD GLUCOSE        I&O's Summary      PHYSICAL EXAM:  Vital Signs Last 24 Hrs  T(C): 36.4 (22 Mar 2024 08:01), Max: 36.6 (21 Mar 2024 20:16)  T(F): 97.5 (22 Mar 2024 08:01), Max: 97.8 (21 Mar 2024 20:16)  HR: 67 (22 Mar 2024 08:01) (66 - 67)  BP: 85/53 (22 Mar 2024 08:01) (85/53 - 131/89)  BP(mean): --  RR: 16 (22 Mar 2024 08:01) (16 - 16)  SpO2: 95% (22 Mar 2024 08:01) (95% - 100%)    Parameters below as of 22 Mar 2024 08:01  Patient On (Oxygen Delivery Method): room air    GENERAL: Not in distress. Alert    HEENT: clear conjuctiva, MMM. no pallor or icterus. c-collar on  CARDIOVASCULAR: RRR S1, S2. No murmur/rubs/gallop  LUNGS: BLAE+, no rales, no wheezing, no rhonchi.    ABDOMEN: ND. Soft,  NT, no guarding / rebound / rigidity. BS normoactive  BACK: No spine tenderness.  EXTREMITIES: no edema. no leg or calf TP.  SKIN: warm and dry  PSYCHIATRIC: Calm.  No agitation.  CNS: AAO. moves limbs, follows commands    LABS:                        12.0   7.67  )-----------( 401      ( 21 Mar 2024 06:47 )             36.9     03-21    142  |  102  |  17  ----------------------------<  97  3.9   |  27  |  0.70    Ca    9.7      21 Mar 2024 06:47    TPro  7.0  /  Alb  3.2<L>  /  TBili  0.3  /  DBili  x   /  AST  27  /  ALT  38  /  AlkPhos  74  03-21          Urinalysis Basic - ( 21 Mar 2024 06:47 )    Color: x / Appearance: x / SG: x / pH: x  Gluc: 97 mg/dL / Ketone: x  / Bili: x / Urobili: x   Blood: x / Protein: x / Nitrite: x   Leuk Esterase: x / RBC: x / WBC x   Sq Epi: x / Non Sq Epi: x / Bacteria: x            RADIOLOGY & ADDITIONAL TESTS:  Imaging from Last 24 Hours:    Electrocardiogram/QTc Interval:    COORDINATION OF CARE:  Care Discussed with Consultants/Other Providers:

## 2024-03-22 NOTE — PROGRESS NOTE ADULT - SUBJECTIVE AND OBJECTIVE BOX
HPI:  Mrs. Ai Dumont is a 63-year-old female patient with past medical history of RYGB (Anders-en-Y gastric bypass), hypertension, hyperlipidemia, depression, social EtOH use, who presented to the ED prior to admission after an episode of syncope and fall. She lost consciousness without warning nor with a preceding aura. No prior history of similar episodes. She regained consciousness some time later (unsure about duration) around 9pm but reported feeling too weak to steady herself or get up. She laid on the carpeted floor from 9PM through 7:30AM the following morning before she regained strength to take herself downstairs and call for help. She reported bilateral upper extremity pain, mostly located in the shoulders, right thumb pain, and upper neck pain. She had new onset bilateral upper extremity weakness more profound on the right side. She has since remained alert and oriented. Workup performed demonstrated C5-C6 cervical fractures with no other injuries. Patient was admitted to [facility] on 3/8/24 for surgical management of a cervical spine injury following syncopal fall. CT of the cervical spine reported acute anterior inferior endplate vertebral body fractures of C5 and C6 vertebrae. MR of cervical spine reported advanced multilevel degenerative changes contributing to high-grade multilevel central canal stenosis noting severe central canal stenosis and cord compression at C3-C4, C4-C5 and C5-C6. Following medical and cardiology clearance and optimization, patient was taken to the OR and underwent C3-C7 decompression, posterior spinal fusion with muscle flap reconstruction on 3/9/2024 with Dr. Layton. Plastic surgery consulted intraop to evaluate patient for muscle flap reconstruction of posterior spine wound given wide exposure of spine structures, need for bilateral multilevel hardware placement, use of bone graft requiring healthy vascularized muscle flap coverage of exposed vital structures and extensive foreign body. Patient tolerated procedure well. Patient was placed in hard cervical collar post op. Drain placed intraoperatively by Plastic Surgery team with outputs to be monitored q shift. Drain pulled on 3/14/24 successfully. Patient transferred to the SICU post operatively for q1 neuro checks with eventual downgrade to the floor on 3/13/24. Patient placed on PCA for acute pain control, which was discontinued on 3/10/24 with transition to PO analgesics. CT of the wrist completed due to wrist pain to evaluate for scaphoid fx: negative for acute fx. Patient was evaluated postoperatively by physical and occupational therapists for weight bearing as tolerated ambulation in cervical collar, and advised that patient would benefit from admission to rehab facility. Physical medicine and rehab team evaluated and agreed with the recommendations. Discharge and Orthopedic Care instructions were delineated in the Discharge Plan and reviewed with the patient per documentation. All medications were delineated in the medication reconciliation tool and key points were reviewed with the patient per documentation. Patient was deemed stable from an Orthopedic & medical standpoint at prior facility and was discharge on 3/16/24 with no new reported neuro deficits to be admitted at [facility].  (16 Mar 2024 12:47)    TDD 3/29 home  ___________________________________________________________________________    SUBJECTIVE/ROS  Patient was seen and evaluated at bedside and at therapy today.  Reported no overnight events and is in no acute distress.  Discussed with her  and answered their questions in detail.   Patient is motivated to participate on the recommended rehabilitation program.  Denies any CP, SOB, PULLIAM, palpitations, fever, chills, body aches, cough, congestion, or any other symptoms at this time.   ___________________________________________________________________________    Vital Signs Last 24 Hrs  T(C): 36.4 (22 Mar 2024 08:01), Max: 36.6 (21 Mar 2024 20:16)  T(F): 97.5 (22 Mar 2024 08:01), Max: 97.8 (21 Mar 2024 20:16)  HR: 67 (22 Mar 2024 08:01) (66 - 73)  BP: 85/53 (22 Mar 2024 08:01) (85/53 - 131/89)  BP(mean): --  RR: 16 (22 Mar 2024 08:01) (16 - 16)  SpO2: 95% (22 Mar 2024 08:01) (95% - 100%)    Parameters below as of 22 Mar 2024 08:01  Patient On (Oxygen Delivery Method): room air        ___________________________________________________________________________    LABS                        12.0   7.67  )-----------( 401      ( 21 Mar 2024 06:47 )             36.9     03-21    142  |  102  |  17  ----------------------------<  97  3.9   |  27  |  0.70    Ca    9.7      21 Mar 2024 06:47    TPro  7.0  /  Alb  3.2<L>  /  TBili  0.3  /  DBili  x   /  AST  27  /  ALT  38  /  AlkPhos  74  03-21      ___________________________________________________________________________    MEDICATIONS  (STANDING):  acetaminophen     Tablet .. 975 milliGRAM(s) Oral every 8 hours  atorvastatin 10 milliGRAM(s) Oral at bedtime  cyanocobalamin 1000 MICROGram(s) Oral daily  enoxaparin Injectable 40 milliGRAM(s) SubCutaneous every 24 hours  HYDROmorphone   Tablet 4 milliGRAM(s) Oral every 6 hours  lidocaine   4% Patch 1 Patch Transdermal daily  lidocaine   4% Patch 1 Patch Transdermal daily  melatonin 3 milliGRAM(s) Oral at bedtime  multivitamin 1 Tablet(s) Oral daily  naloxegol 12.5 milliGRAM(s) Oral daily  pantoprazole    Tablet 40 milliGRAM(s) Oral before breakfast  polyethylene glycol 3350 17 Gram(s) Oral daily  senna 2 Tablet(s) Oral at bedtime  tiZANidine 4 milliGRAM(s) Oral three times a day    MEDICATIONS  (PRN):  benzocaine/menthol Lozenge 1 Lozenge Oral every 2 hours PRN Sore Throat  bisacodyl Suppository 10 milliGRAM(s) Rectal daily PRN Constipation  oxyCODONE    IR 10 milliGRAM(s) Oral every 4 hours PRN Severe Pain (7 - 10)  traMADol 50 milliGRAM(s) Oral every 6 hours PRN Mild Pain (1 - 3)      ___________________________________________________________________________    PHYSICAL EXAM:    Constitutional - NAD, Comfortable  HEENT - NCAT, EOMI  Chest - Breathing comfortably, No wheezing  Cardiovascular - S1S2   Abdomen - Soft  Extremities - No C/C/E, No calf tenderness   Neurologic Exam -                    Cognitive - AAO to self, place, date, year, situation     Communication - Fluent, No dysarthria     Cranial Nerves - CN 2-12 grossly intact     Motor -                     LEFT    UE - ShAB 5/5, EF 5/5, EE 5/5, WE 5/5,  5/5                    RIGHT UE - ShAB 3/5, EF 3/5, EE 3/5, WE 3/5,  3/5                    LEFT    LE - HF 5/5, KE 5/5, DF 5/5, PF 5/5                    RIGHT LE - HF 4/5, KE 4/5, DF 4/5, PF 4/5        Sensory - slightly decreased to light touch in all 4 extremities     Reflexes - DTR Intact, No primitive reflexive     Coordination - FTN intact on L, slowed ability on R     OculoVestibular - No nystagmus    Psychiatric - Mood stable, Affect WNL    ___________________________________________________________________________ HPI:  Mrs. Ai Dumont is a 63-year-old female patient with past medical history of RYGB (Anders-en-Y gastric bypass), hypertension, hyperlipidemia, depression, social EtOH use, who presented to the ED prior to admission after an episode of syncope and fall. She lost consciousness without warning nor with a preceding aura. No prior history of similar episodes. She regained consciousness some time later (unsure about duration) around 9pm but reported feeling too weak to steady herself or get up. She laid on the carpeted floor from 9PM through 7:30AM the following morning before she regained strength to take herself downstairs and call for help. She reported bilateral upper extremity pain, mostly located in the shoulders, right thumb pain, and upper neck pain. She had new onset bilateral upper extremity weakness more profound on the right side. She has since remained alert and oriented. Workup performed demonstrated C5-C6 cervical fractures with no other injuries. Patient was admitted to [facility] on 3/8/24 for surgical management of a cervical spine injury following syncopal fall. CT of the cervical spine reported acute anterior inferior endplate vertebral body fractures of C5 and C6 vertebrae. MR of cervical spine reported advanced multilevel degenerative changes contributing to high-grade multilevel central canal stenosis noting severe central canal stenosis and cord compression at C3-C4, C4-C5 and C5-C6. Following medical and cardiology clearance and optimization, patient was taken to the OR and underwent C3-C7 decompression, posterior spinal fusion with muscle flap reconstruction on 3/9/2024 with Dr. Layton. Plastic surgery consulted intraop to evaluate patient for muscle flap reconstruction of posterior spine wound given wide exposure of spine structures, need for bilateral multilevel hardware placement, use of bone graft requiring healthy vascularized muscle flap coverage of exposed vital structures and extensive foreign body. Patient tolerated procedure well. Patient was placed in hard cervical collar post op. Drain placed intraoperatively by Plastic Surgery team with outputs to be monitored q shift. Drain pulled on 3/14/24 successfully. Patient transferred to the SICU post operatively for q1 neuro checks with eventual downgrade to the floor on 3/13/24. Patient placed on PCA for acute pain control, which was discontinued on 3/10/24 with transition to PO analgesics. CT of the wrist completed due to wrist pain to evaluate for scaphoid fx: negative for acute fx. Patient was evaluated postoperatively by physical and occupational therapists for weight bearing as tolerated ambulation in cervical collar, and advised that patient would benefit from admission to rehab facility. Physical medicine and rehab team evaluated and agreed with the recommendations. Discharge and Orthopedic Care instructions were delineated in the Discharge Plan and reviewed with the patient per documentation. All medications were delineated in the medication reconciliation tool and key points were reviewed with the patient per documentation. Patient was deemed stable from an Orthopedic & medical standpoint at prior facility and was discharge on 3/16/24 with no new reported neuro deficits to be admitted at [facility].  (16 Mar 2024 12:47)    TDD 3/29 home  ___________________________________________________________________________    SUBJECTIVE/ROS  Patient was seen and evaluated at bedside and at therapy today.  Reported no overnight events and is in no acute distress.  Discussed with her  and answered their questions in detail.   Dressing removed from incision and left JUAN.  Patient is motivated to participate on the recommended rehabilitation program.  Denies any CP, SOB, PULLIAM, palpitations, fever, chills, body aches, cough, congestion, or any other symptoms at this time.   ___________________________________________________________________________    Vital Signs Last 24 Hrs  T(C): 36.4 (22 Mar 2024 08:01), Max: 36.6 (21 Mar 2024 20:16)  T(F): 97.5 (22 Mar 2024 08:01), Max: 97.8 (21 Mar 2024 20:16)  HR: 67 (22 Mar 2024 08:01) (66 - 73)  BP: 85/53 (22 Mar 2024 08:01) (85/53 - 131/89)  RR: 16 (22 Mar 2024 08:01) (16 - 16)  SpO2: 95% (22 Mar 2024 08:01) (95% - 100%)    ___________________________________________________________________________    LABS                        12.0   7.67  )-----------( 401      ( 21 Mar 2024 06:47 )             36.9     03-21    142  |  102  |  17  ----------------------------<  97  3.9   |  27  |  0.70    Ca    9.7      21 Mar 2024 06:47    TPro  7.0  /  Alb  3.2<L>  /  TBili  0.3  /  DBili  x   /  AST  27  /  ALT  38  /  AlkPhos  74  03-21    ___________________________________________________________________________    MEDICATIONS  (STANDING):  acetaminophen     Tablet .. 975 milliGRAM(s) Oral every 8 hours  atorvastatin 10 milliGRAM(s) Oral at bedtime  cyanocobalamin 1000 MICROGram(s) Oral daily  enoxaparin Injectable 40 milliGRAM(s) SubCutaneous every 24 hours  HYDROmorphone   Tablet 4 milliGRAM(s) Oral every 6 hours  lidocaine   4% Patch 1 Patch Transdermal daily  lidocaine   4% Patch 1 Patch Transdermal daily  melatonin 3 milliGRAM(s) Oral at bedtime  multivitamin 1 Tablet(s) Oral daily  naloxegol 12.5 milliGRAM(s) Oral daily  pantoprazole    Tablet 40 milliGRAM(s) Oral before breakfast  polyethylene glycol 3350 17 Gram(s) Oral daily  senna 2 Tablet(s) Oral at bedtime  tiZANidine 4 milliGRAM(s) Oral three times a day    MEDICATIONS  (PRN):  benzocaine/menthol Lozenge 1 Lozenge Oral every 2 hours PRN Sore Throat  bisacodyl Suppository 10 milliGRAM(s) Rectal daily PRN Constipation  oxyCODONE    IR 10 milliGRAM(s) Oral every 4 hours PRN Severe Pain (7 - 10)  traMADol 50 milliGRAM(s) Oral every 6 hours PRN Mild Pain (1 - 3)    ___________________________________________________________________________    PHYSICAL EXAM:    Constitutional - NAD, Comfortable  HEENT - NCAT, EOMI  Chest - Breathing comfortably, No wheezing  Cardiovascular - S1S2   Abdomen - Soft  Extremities - No C/C/E, No calf tenderness   Neurologic Exam -                    Cognitive - AAO to self, place, date, year, situation     Communication - Fluent, No dysarthria     Cranial Nerves - CN 2-12 grossly intact     Motor -                     LEFT    UE - ShAB 5/5, EF 5/5, EE 5/5, WE 5/5,  5/5                    RIGHT UE - ShAB 3/5, EF 3/5, EE 3/5, WE 3/5,  3/5                    LEFT    LE - HF 5/5, KE 5/5, DF 5/5, PF 5/5                    RIGHT LE - HF 4/5, KE 4/5, DF 4/5, PF 4/5        Sensory - slightly decreased to light touch in all 4 extremities     Reflexes - DTR Intact, No primitive reflexive     Coordination - FTN intact on L, slowed ability on R     OculoVestibular - No nystagmus    Psychiatric - Mood stable, Affect WNL    ___________________________________________________________________________

## 2024-03-22 NOTE — PROGRESS NOTE ADULT - ASSESSMENT
63F w/ Anders Y bypass, HTN, HLD, depression  Admit SP Fall, BL UE Weakness, C5/C6 Fracture  SP repair    Cervical Cord Syndrome  Severe C3-6 Canal Stenosis  -s/p C3-7 decompression w/ posterior spinal fusion and muscle flap reconstruction on 3/9  C collar   -Keep surgical dressing clean and dry, have dressing/sutures removed and steri-strips applied post operative day#13 - 3/22    Syncope  -Unclear cause   -CTH/CTA Head and Neck were unremarkable  -Echo 3/8 showed grossly normal EF, no significant valvular disease   -s/p telemetry monitoring during her hospitalization   -Will monitor     HTN  Meds held due to low bp  check OH periodically    #HLD  -Continue atorvastatin 10mg daily    #s/p Anders en Y bypass  #GERD  -PPI \    Moderate protein-calorie malnutrition.  nutrition on board    #DVT prophylaxis:   - Lovenox SC

## 2024-03-22 NOTE — PROGRESS NOTE ADULT - ASSESSMENT
Mrs. Ai Dumont is a 63-year-old female patient with past medical history of RYGB (Naders-en-Y gastric bypass), hypertension, hyperlipidemia, depression, and social EtOH use who is admitted for Acute Inpatient Rehabilitation with a multidisciplinary rehab program at [facility] with functional impairments in ADLs and mobility secondary to a traumatic cervical spinal cord injury after a syncope and fall with loss of consciousness.    Traumatic cervical spinal cord injury  - Central cord syndrome  - Severe central canal stenosis and cord compression at C3-C4, C4-C5 and C5-C6 with C5 and C6 fractures  - S/P C3-7 decompression w/ posterior spinal fusion by Neurosurgery and muscle flap reconstruction by Plastic Surgery on 3/8/24       * Patient to remain in C collar at all times        * Keep surgical dressing clean and dry, have dressing/sutures removed and steri-strips applied post operative day #13 (3/22/24)  - Traumatic brain injury with prolonged loss of consciousness  - Generalized weakness - UEs > LEs  - Impaired ADLs and mobility  - Need for assistance with personal care  - Continue Comprehensive Multidisciplinary Rehab Program:       * PT to focus on BUE paresis in setting of cord compression working on strengthening of extremities as well as transfers and gait training       * OT to focus on functional deficits in setting of BUE paresis  - Pain regimen as below    HTN  - enalapril 5mg daily  - Monitor BP    HLD  - atorvastatin 10mg daily    Pain  - Tylenol scheduled, tramadol prn, hydromorphone, and oxycodone prn.   - tizanidine standing 3/20.   - lidocaine patches bilateral shoulders  - Avoid sedating medications that may interfere with cognitive recovery    Sleep  - Maintain quiet hours and a low stim environment.   - Melatonin PRN     GI / Bowel  s/p Anders en Y bypass  - Senna qHS  - Miralax Daily  - GI ppx: protonix  - naloxegol 12.5mg daily     / Bladder  - Currently patient voids independently   - bladder scan once on admission with straight cath for >400cc.    Skin / Pressure injury assessment  - Skin assessment on admission performed   - Monitor Incisions:  posterior cervical incision site  -- will plan to remove dressing and staples tomorrow 3/22.   - Turn q2 hours in bed while awake, air mattress  - nursing to monitor skin qShift  - soft heel protectors  - skin barrier cream PRN    Diet/Dysphagia assessment:  - Diet Consistency: DASH  - Supplements: b12 and MVI  - Aspiration Precautions  - Nutrition consult    DVT prophylaxis:   - Lovenox    Outpatient Follow-up:  Pasha Layton  Orthopaedic Surgery  [address], [address]  [place] [address]  Phone: [phone]  Fax: [phone]  Follow Up Time: 2 weeks    f/u Pain management    Code Status/Emergency Contact:  Full Code     Steven - spouse - [phone]  PCP is Dr. Beckman  Patient uses [facility] in [place] as her preferred pharmacy.   ---------------

## 2024-03-23 PROCEDURE — 99232 SBSQ HOSP IP/OBS MODERATE 35: CPT

## 2024-03-23 PROCEDURE — 99232 SBSQ HOSP IP/OBS MODERATE 35: CPT | Mod: GC

## 2024-03-23 RX ORDER — ACETAMINOPHEN 500 MG
650 TABLET ORAL EVERY 6 HOURS
Refills: 0 | Status: DISCONTINUED | OUTPATIENT
Start: 2024-03-23 | End: 2024-03-25

## 2024-03-23 RX ORDER — OXYCODONE HYDROCHLORIDE 5 MG/1
5 TABLET ORAL EVERY 4 HOURS
Refills: 0 | Status: DISCONTINUED | OUTPATIENT
Start: 2024-03-23 | End: 2024-03-25

## 2024-03-23 RX ORDER — OXYCODONE HYDROCHLORIDE 5 MG/1
5 TABLET ORAL EVERY 6 HOURS
Refills: 0 | Status: DISCONTINUED | OUTPATIENT
Start: 2024-03-23 | End: 2024-03-23

## 2024-03-23 RX ORDER — ACETAMINOPHEN 500 MG
650 TABLET ORAL EVERY 6 HOURS
Refills: 0 | Status: DISCONTINUED | OUTPATIENT
Start: 2024-03-23 | End: 2024-03-23

## 2024-03-23 RX ADMIN — SENNA PLUS 2 TABLET(S): 8.6 TABLET ORAL at 21:57

## 2024-03-23 RX ADMIN — LIDOCAINE 1 PATCH: 4 CREAM TOPICAL at 23:19

## 2024-03-23 RX ADMIN — LIDOCAINE 1 PATCH: 4 CREAM TOPICAL at 00:29

## 2024-03-23 RX ADMIN — LIDOCAINE 1 PATCH: 4 CREAM TOPICAL at 11:44

## 2024-03-23 RX ADMIN — OXYCODONE HYDROCHLORIDE 10 MILLIGRAM(S): 5 TABLET ORAL at 03:42

## 2024-03-23 RX ADMIN — OXYCODONE HYDROCHLORIDE 10 MILLIGRAM(S): 5 TABLET ORAL at 21:50

## 2024-03-23 RX ADMIN — LIDOCAINE 1 PATCH: 4 CREAM TOPICAL at 19:46

## 2024-03-23 RX ADMIN — Medication 975 MILLIGRAM(S): at 06:41

## 2024-03-23 RX ADMIN — HYDROMORPHONE HYDROCHLORIDE 4 MILLIGRAM(S): 2 INJECTION INTRAMUSCULAR; INTRAVENOUS; SUBCUTANEOUS at 01:36

## 2024-03-23 RX ADMIN — PREGABALIN 1000 MICROGRAM(S): 225 CAPSULE ORAL at 11:44

## 2024-03-23 RX ADMIN — Medication 3 MILLIGRAM(S): at 21:58

## 2024-03-23 RX ADMIN — TIZANIDINE 4 MILLIGRAM(S): 4 TABLET ORAL at 13:20

## 2024-03-23 RX ADMIN — HYDROMORPHONE HYDROCHLORIDE 4 MILLIGRAM(S): 2 INJECTION INTRAMUSCULAR; INTRAVENOUS; SUBCUTANEOUS at 06:43

## 2024-03-23 RX ADMIN — OXYCODONE HYDROCHLORIDE 10 MILLIGRAM(S): 5 TABLET ORAL at 02:14

## 2024-03-23 RX ADMIN — ATORVASTATIN CALCIUM 10 MILLIGRAM(S): 80 TABLET, FILM COATED ORAL at 21:57

## 2024-03-23 RX ADMIN — LIDOCAINE 1 PATCH: 4 CREAM TOPICAL at 11:45

## 2024-03-23 RX ADMIN — TIZANIDINE 4 MILLIGRAM(S): 4 TABLET ORAL at 21:57

## 2024-03-23 RX ADMIN — OXYCODONE HYDROCHLORIDE 10 MILLIGRAM(S): 5 TABLET ORAL at 20:38

## 2024-03-23 RX ADMIN — Medication 975 MILLIGRAM(S): at 07:15

## 2024-03-23 RX ADMIN — PANTOPRAZOLE SODIUM 40 MILLIGRAM(S): 20 TABLET, DELAYED RELEASE ORAL at 06:42

## 2024-03-23 RX ADMIN — OXYCODONE HYDROCHLORIDE 10 MILLIGRAM(S): 5 TABLET ORAL at 14:18

## 2024-03-23 RX ADMIN — HYDROMORPHONE HYDROCHLORIDE 4 MILLIGRAM(S): 2 INJECTION INTRAMUSCULAR; INTRAVENOUS; SUBCUTANEOUS at 00:29

## 2024-03-23 RX ADMIN — OXYCODONE HYDROCHLORIDE 10 MILLIGRAM(S): 5 TABLET ORAL at 13:20

## 2024-03-23 RX ADMIN — TIZANIDINE 4 MILLIGRAM(S): 4 TABLET ORAL at 06:46

## 2024-03-23 RX ADMIN — HYDROMORPHONE HYDROCHLORIDE 4 MILLIGRAM(S): 2 INJECTION INTRAMUSCULAR; INTRAVENOUS; SUBCUTANEOUS at 07:15

## 2024-03-23 RX ADMIN — Medication 1 TABLET(S): at 11:44

## 2024-03-23 RX ADMIN — ENOXAPARIN SODIUM 40 MILLIGRAM(S): 100 INJECTION SUBCUTANEOUS at 20:36

## 2024-03-23 NOTE — PROGRESS NOTE ADULT - ASSESSMENT
63F w/ Anders Y bypass, HTN, HLD, depression  Admit SP Fall, BL UE Weakness, C5/C6 Fracture  SP repair    #Cervical Cord Syndrome  #Severe C3-6 Canal Stenosis  -s/p C3-7 decompression w/ posterior spinal fusion and muscle flap reconstruction on 3/9  C collar   -Keep surgical dressing clean and dry, have dressing/sutures removed and steri-strips applied post operative day#13 - 3/22    #Syncope  -Unclear cause   -CTH/CTA Head and Neck were unremarkable  -Echo 3/8 showed grossly normal EF, no significant valvular disease   -s/p telemetry monitoring during her hospitalization   -Will monitor     #HTN  #Now Hypotensive, secondary to pain meds  -Meds held due to low bp  -Adjust pain meds  -check OH periodically    #HLD  -Continue atorvastatin 10mg daily    #s/p Anders en Y bypass  #GERD  -PPI     Moderate protein-calorie malnutrition.  nutrition on board    #DVT prophylaxis:   - Lovenox SC

## 2024-03-23 NOTE — PROGRESS NOTE ADULT - ASSESSMENT
Mrs. Ai Dumont is a 63-year-old female patient with past medical history of RYGB (Anders-en-Y gastric bypass), hypertension, hyperlipidemia, depression, and social EtOH use who is admitted for Acute Inpatient Rehabilitation with a multidisciplinary rehab program at [facility] with functional impairments in ADLs and mobility secondary to a traumatic cervical spinal cord injury after a syncope and fall with loss of consciousness.    Traumatic cervical spinal cord injury  - Central cord syndrome  - Severe central canal stenosis and cord compression at C3-C4, C4-C5 and C5-C6 with C5 and C6 fractures  - S/P C3-7 decompression w/ posterior spinal fusion by Neurosurgery and muscle flap reconstruction by Plastic Surgery on 3/8/24       * Patient to remain in C collar at all times        * Keep surgical dressing clean and dry, have dressing/sutures removed and steri-strips applied post operative day #13 (3/22/24)  - Traumatic brain injury with prolonged loss of consciousness  - Generalized weakness - UEs > LEs  - Impaired ADLs and mobility  - Need for assistance with personal care  - Continue Comprehensive Multidisciplinary Rehab Program:       * PT to focus on BUE paresis in setting of cord compression working on strengthening of extremities as well as transfers and gait training       * OT to focus on functional deficits in setting of BUE paresis  - Pain regimen as below    HTN  - enalapril 5mg daily  - Monitor BP, (03/23) 85/69 - 130/84 probably 2/2 pain meds     HLD  - atorvastatin 10mg daily    Pain  - Tylenol 650 mg q 6 hours PRN, Oxycodone 5 mg, 10 mg po q 4 hours prn.   - D/C Diluded and Tramadol   - tizanidine standing 3/20.   - lidocaine patches bilateral shoulders  - Avoid sedating medications that may interfere with cognitive recovery    Sleep  - Maintain quiet hours and a low stim environment.   - Melatonin PRN     GI / Bowel  s/p Anders en Y bypass  - Senna qHS  - Miralax Daily  - GI ppx: protonix  - naloxegol 12.5mg daily     / Bladder  - Currently patient voids independently   - bladder scan once on admission with straight cath for >400cc.    Skin / Pressure injury assessment  - Skin assessment on admission performed   - Monitor Incisions:  posterior cervical incision site  - Remove dressing and staples tomorrow 3/22.   - Turn q2 hours in bed while awake, air mattress  - nursing to monitor skin qShift  - soft heel protectors  - skin barrier cream PRN    Diet:  - Diet Consistency: DASH  - Supplements: b12 and MVI  - Aspiration Precautions  - Nutrition consult    DVT prophylaxis:   - Lovenox    Outpatient Follow-up:  Pasha Layton  Orthopaedic Surgery  [address] [place] [address], [address]  [place] [address]  Phone: [phone]  Fax: [phone]  Follow Up Time: 2 weeks    f/u Pain management    Code Status/Emergency Contact:  Full Code     Steven - spouse - [phone]  PCP is Dr. Beckman  [facility] [place] [facility] preferred [facility].   ---------------

## 2024-03-23 NOTE — PROGRESS NOTE ADULT - SUBJECTIVE AND OBJECTIVE BOX
PROGRESS NOTE:     Patient is a 63y old  Female who presents with a chief complaint of Traumatic cervical spinal cord injury (22 Mar 2024 15:22)          SUBJECTIVE & OBJECTIVE:   Pt seen and examined at bedside in AM. Bp noted to be soft in AM post pain meds   no overnight events.       REVIEW OF SYSTEMS: remaining ROS negative     PHYSICAL EXAM:  VITALS:  Vital Signs Last 24 Hrs  T(C): 36.3 (23 Mar 2024 08:02), Max: 36.6 (22 Mar 2024 21:35)  T(F): 97.4 (23 Mar 2024 08:02), Max: 97.9 (22 Mar 2024 21:35)  HR: 68 (23 Mar 2024 08:57) (64 - 72)  BP: 111/72 (23 Mar 2024 08:57) (85/69 - 130/84)  BP(mean): --  RR: 16 (23 Mar 2024 08:57) (14 - 17)  SpO2: 97% (23 Mar 2024 08:57) (96% - 99%)    Parameters below as of 23 Mar 2024 08:57  Patient On (Oxygen Delivery Method): room air          GENERAL: NAD,  no increased WOB  HEAD:  Atraumatic, Normocephalic  EYES: EOMI, conjunctiva and sclera clear  ENMT: Moist mucous membranes  NECK: C-collar in place  NERVOUS SYSTEM:  Alert & Oriented   CHEST/LUNG: Clear to auscultation bilaterally; No rales, rhonchi, wheezing  HEART: Regular rate and rhythm  ABDOMEN: Soft, Nontender, Nondistended; Bowel sounds present  EXTREMITIES:  No clubbing, cyanosis, calf tenderness or edema b/l      MEDICATIONS  (STANDING):  acetaminophen     Tablet .. 975 milliGRAM(s) Oral every 8 hours  atorvastatin 10 milliGRAM(s) Oral at bedtime  cyanocobalamin 1000 MICROGram(s) Oral daily  enoxaparin Injectable 40 milliGRAM(s) SubCutaneous every 24 hours  HYDROmorphone   Tablet 4 milliGRAM(s) Oral every 6 hours  lidocaine   4% Patch 1 Patch Transdermal daily  lidocaine   4% Patch 1 Patch Transdermal daily  melatonin 3 milliGRAM(s) Oral at bedtime  multivitamin 1 Tablet(s) Oral daily  naloxegol 12.5 milliGRAM(s) Oral daily  pantoprazole    Tablet 40 milliGRAM(s) Oral before breakfast  polyethylene glycol 3350 17 Gram(s) Oral daily  senna 2 Tablet(s) Oral at bedtime  tiZANidine 4 milliGRAM(s) Oral three times a day    MEDICATIONS  (PRN):  benzocaine/menthol Lozenge 1 Lozenge Oral every 2 hours PRN Sore Throat  bisacodyl Suppository 10 milliGRAM(s) Rectal daily PRN Constipation  oxyCODONE    IR 10 milliGRAM(s) Oral every 4 hours PRN Severe Pain (7 - 10)  traMADol 50 milliGRAM(s) Oral every 6 hours PRN Mild Pain (1 - 3)      Allergies    No Known Allergies    Intolerances              LABS:                 CAPILLARY BLOOD GLUCOSE                    RECENT CULTURES:          RADIOLOGY & ADDITIONAL TESTS:        COORDINATION OF CARE:  Care Discussed with Consultants/Other Providers:

## 2024-03-23 NOTE — PROVIDER CONTACT NOTE (OTHER) - RECOMMENDATIONS
pt instructed to use call bell at all times for assistance with transferring. pt instructed to stay in bed at present time and drink fluids

## 2024-03-23 NOTE — PROGRESS NOTE ADULT - SUBJECTIVE AND OBJECTIVE BOX
HPI:  Mrs. Ai Dumont is a 63-year-old female patient with past medical history of RYGB (Anders-en-Y gastric bypass), hypertension, hyperlipidemia, depression, social EtOH use, who presented to the ED prior to admission after an episode of syncope and fall. She lost consciousness without warning nor with a preceding aura. No prior history of similar episodes. She regained consciousness some time later (unsure about duration) around 9pm but reported feeling too weak to steady herself or get up. She laid on the carpeted floor from 9PM through 7:30AM the following morning before she regained strength to take herself downstairs and call for help. She reported bilateral upper extremity pain, mostly located in the shoulders, right thumb pain, and upper neck pain. She had new onset bilateral upper extremity weakness more profound on the right side. She has since remained alert and oriented. Workup performed demonstrated C5-C6 cervical fractures with no other injuries. Patient was admitted to [facility] on 3/8/24 for surgical management of a cervical spine injury following syncopal fall. CT of the cervical spine reported acute anterior inferior endplate vertebral body fractures of C5 and C6 vertebrae. MR of cervical spine reported advanced multilevel degenerative changes contributing to high-grade multilevel central canal stenosis noting severe central canal stenosis and cord compression at C3-C4, C4-C5 and C5-C6. Following medical and cardiology clearance and optimization, patient was taken to the OR and underwent C3-C7 decompression, posterior spinal fusion with muscle flap reconstruction on 3/9/2024 with Dr. Layton. Plastic surgery consulted intraop to evaluate patient for muscle flap reconstruction of posterior spine wound given wide exposure of spine structures, need for bilateral multilevel hardware placement, use of bone graft requiring healthy vascularized muscle flap coverage of exposed vital structures and extensive foreign body. Patient tolerated procedure well. Patient was placed in hard cervical collar post op. Drain placed intraoperatively by Plastic Surgery team with outputs to be monitored q shift. Drain pulled on 3/14/24 successfully. Patient transferred to the SICU post operatively for q1 neuro checks with eventual downgrade to the floor on 3/13/24. Patient placed on PCA for acute pain control, which was discontinued on 3/10/24 with transition to PO analgesics. CT of the wrist completed due to wrist pain to evaluate for scaphoid fx: negative for acute fx. Patient was evaluated postoperatively by physical and occupational therapists for weight bearing as tolerated ambulation in cervical collar, and advised that patient would benefit from admission to rehab facility. Physical medicine and rehab team evaluated and agreed with the recommendations. Discharge and Orthopedic Care instructions were delineated in the Discharge Plan and reviewed with the patient per documentation. All medications were delineated in the medication reconciliation tool and key points were reviewed with the patient per documentation. Patient was deemed stable from an Orthopedic & medical standpoint at prior facility and was discharge on 3/16/24 with no new reported neuro deficits to be admitted at [facility].  (16 Mar 2024 12:47)    TDD 3/29 home  ___________________________________________________________________________    SUBJECTIVE/ROS  Patient was seen and evaluated at bedside. BP is running low   Reported no overnight events and is in no acute distress.  Sleeping well, no new complaints  No issues with bowel and bladder, LBM (03/22)    Patient is motivated to participate on the recommended rehabilitation program.  Denies any CP, SOB, PULLIAM, palpitations, fever, chills, body aches, cough, congestion, or any other symptoms at this time.   ___________________________________________________________________________    Vital Signs Last 24 Hrs  T(C): 36.3 (23 Mar 2024 08:02), Max: 36.6 (22 Mar 2024 21:35)  T(F): 97.4 (23 Mar 2024 08:02), Max: 97.9 (22 Mar 2024 21:35)  HR: 68 (23 Mar 2024 08:57) (64 - 72)  BP: 111/72 (23 Mar 2024 08:57) (85/69 - 130/84)  RR: 16 (23 Mar 2024 08:57) (14 - 17)  SpO2: 97% (23 Mar 2024 08:57) (96% - 99%)  Parameters below as of 23 Mar 2024 08:57  Patient On (Oxygen Delivery Method): room air    ___________________________________________________________________________    LABS                        12.0   7.67  )-----------( 401      ( 21 Mar 2024 06:47 )             36.9     03-21    142  |  102  |  17  ----------------------------<  97  3.9   |  27  |  0.70    Ca    9.7      21 Mar 2024 06:47    TPro  7.0  /  Alb  3.2<L>  /  TBili  0.3  /  DBili  x   /  AST  27  /  ALT  38  /  AlkPhos  74  03-21    ___________________________________________________________________________    MEDICATIONS  (STANDING):  acetaminophen     Tablet .. 975 milliGRAM(s) Oral every 8 hours  atorvastatin 10 milliGRAM(s) Oral at bedtime  cyanocobalamin 1000 MICROGram(s) Oral daily  enoxaparin Injectable 40 milliGRAM(s) SubCutaneous every 24 hours  HYDROmorphone   Tablet 4 milliGRAM(s) Oral every 6 hours  lidocaine   4% Patch 1 Patch Transdermal daily  lidocaine   4% Patch 1 Patch Transdermal daily  melatonin 3 milliGRAM(s) Oral at bedtime  multivitamin 1 Tablet(s) Oral daily  naloxegol 12.5 milliGRAM(s) Oral daily  pantoprazole    Tablet 40 milliGRAM(s) Oral before breakfast  polyethylene glycol 3350 17 Gram(s) Oral daily  senna 2 Tablet(s) Oral at bedtime  tiZANidine 4 milliGRAM(s) Oral three times a day    MEDICATIONS  (PRN):  benzocaine/menthol Lozenge 1 Lozenge Oral every 2 hours PRN Sore Throat  bisacodyl Suppository 10 milliGRAM(s) Rectal daily PRN Constipation  oxyCODONE    IR 10 milliGRAM(s) Oral every 4 hours PRN Severe Pain (7 - 10)  traMADol 50 milliGRAM(s) Oral every 6 hours PRN Mild Pain (1 - 3)    ___________________________________________________________________________    PHYSICAL EXAM:  Constitutional - NAD, Comfortable  HEENT - NCAT, EOMI, PERRLA  Chest - Breathing comfortably, No wheezing  Cardiovascular - S1S2   Abdomen - Soft  Extremities - No C/C/E, No calf tenderness   Neurologic Exam -                    Cognitive - AAO to self, place, date, year, situation     Communication - Fluent, No dysarthria     Cranial Nerves - CN 2-12 grossly intact     Motor -                     LEFT    UE - ShAB 5/5, EF 5/5, EE 5/5, WE 5/5,  5/5                    RIGHT UE - ShAB 3/5, EF 3/5, EE 3/5, WE 3/5,  3/5                    LEFT    LE - HF 5/5, KE 5/5, DF 5/5, PF 5/5                    RIGHT LE - HF 4/5, KE 4/5, DF 4/5, PF 4/5        Sensory - slightly decreased to light touch in all 4 extremities     Coordination - FTN intact on L, slowed ability on R   Psychiatric - Mood stable, Affect WNL    ___________________________________________________________________________

## 2024-03-24 PROCEDURE — 99232 SBSQ HOSP IP/OBS MODERATE 35: CPT | Mod: GC

## 2024-03-24 PROCEDURE — 99232 SBSQ HOSP IP/OBS MODERATE 35: CPT

## 2024-03-24 RX ADMIN — OXYCODONE HYDROCHLORIDE 10 MILLIGRAM(S): 5 TABLET ORAL at 02:17

## 2024-03-24 RX ADMIN — Medication 1 TABLET(S): at 12:04

## 2024-03-24 RX ADMIN — ATORVASTATIN CALCIUM 10 MILLIGRAM(S): 80 TABLET, FILM COATED ORAL at 21:38

## 2024-03-24 RX ADMIN — Medication 3 MILLIGRAM(S): at 21:39

## 2024-03-24 RX ADMIN — TIZANIDINE 4 MILLIGRAM(S): 4 TABLET ORAL at 06:00

## 2024-03-24 RX ADMIN — ENOXAPARIN SODIUM 40 MILLIGRAM(S): 100 INJECTION SUBCUTANEOUS at 20:11

## 2024-03-24 RX ADMIN — OXYCODONE HYDROCHLORIDE 10 MILLIGRAM(S): 5 TABLET ORAL at 22:51

## 2024-03-24 RX ADMIN — OXYCODONE HYDROCHLORIDE 10 MILLIGRAM(S): 5 TABLET ORAL at 08:25

## 2024-03-24 RX ADMIN — OXYCODONE HYDROCHLORIDE 10 MILLIGRAM(S): 5 TABLET ORAL at 18:36

## 2024-03-24 RX ADMIN — OXYCODONE HYDROCHLORIDE 10 MILLIGRAM(S): 5 TABLET ORAL at 23:30

## 2024-03-24 RX ADMIN — Medication 650 MILLIGRAM(S): at 06:00

## 2024-03-24 RX ADMIN — Medication 650 MILLIGRAM(S): at 07:01

## 2024-03-24 RX ADMIN — PREGABALIN 1000 MICROGRAM(S): 225 CAPSULE ORAL at 12:04

## 2024-03-24 RX ADMIN — LIDOCAINE 1 PATCH: 4 CREAM TOPICAL at 12:04

## 2024-03-24 RX ADMIN — OXYCODONE HYDROCHLORIDE 10 MILLIGRAM(S): 5 TABLET ORAL at 01:54

## 2024-03-24 RX ADMIN — TIZANIDINE 4 MILLIGRAM(S): 4 TABLET ORAL at 12:04

## 2024-03-24 RX ADMIN — LIDOCAINE 1 PATCH: 4 CREAM TOPICAL at 12:05

## 2024-03-24 RX ADMIN — TIZANIDINE 4 MILLIGRAM(S): 4 TABLET ORAL at 21:39

## 2024-03-24 RX ADMIN — TIZANIDINE 4 MILLIGRAM(S): 4 TABLET ORAL at 12:05

## 2024-03-24 RX ADMIN — LIDOCAINE 1 PATCH: 4 CREAM TOPICAL at 19:08

## 2024-03-24 RX ADMIN — OXYCODONE HYDROCHLORIDE 10 MILLIGRAM(S): 5 TABLET ORAL at 14:35

## 2024-03-24 RX ADMIN — PANTOPRAZOLE SODIUM 40 MILLIGRAM(S): 20 TABLET, DELAYED RELEASE ORAL at 06:00

## 2024-03-24 RX ADMIN — LIDOCAINE 1 PATCH: 4 CREAM TOPICAL at 19:55

## 2024-03-24 NOTE — PROGRESS NOTE ADULT - SUBJECTIVE AND OBJECTIVE BOX
HPI:  Mrs. Ai Dumont is a 63-year-old female patient with past medical history of RYGB (Anders-en-Y gastric bypass), hypertension, hyperlipidemia, depression, social EtOH use, who presented to the ED prior to admission after an episode of syncope and fall. She lost consciousness without warning nor with a preceding aura. No prior history of similar episodes. She regained consciousness some time later (unsure about duration) around 9pm but reported feeling too weak to steady herself or get up. She laid on the carpeted floor from 9PM through 7:30AM the following morning before she regained strength to take herself downstairs and call for help. She reported bilateral upper extremity pain, mostly located in the shoulders, right thumb pain, and upper neck pain. She had new onset bilateral upper extremity weakness more profound on the right side. She has since remained alert and oriented. Workup performed demonstrated C5-C6 cervical fractures with no other injuries. Patient was admitted to [facility] on 3/8/24 for surgical management of a cervical spine injury following syncopal fall. CT of the cervical spine reported acute anterior inferior endplate vertebral body fractures of C5 and C6 vertebrae. MR of cervical spine reported advanced multilevel degenerative changes contributing to high-grade multilevel central canal stenosis noting severe central canal stenosis and cord compression at C3-C4, C4-C5 and C5-C6. Following medical and cardiology clearance and optimization, patient was taken to the OR and underwent C3-C7 decompression, posterior spinal fusion with muscle flap reconstruction on 3/9/2024 with Dr. Layton. Plastic surgery consulted intraop to evaluate patient for muscle flap reconstruction of posterior spine wound given wide exposure of spine structures, need for bilateral multilevel hardware placement, use of bone graft requiring healthy vascularized muscle flap coverage of exposed vital structures and extensive foreign body. Patient tolerated procedure well. Patient was placed in hard cervical collar post op. Drain placed intraoperatively by Plastic Surgery team with outputs to be monitored q shift. Drain pulled on 3/14/24 successfully. Patient transferred to the SICU post operatively for q1 neuro checks with eventual downgrade to the floor on 3/13/24. Patient placed on PCA for acute pain control, which was discontinued on 3/10/24 with transition to PO analgesics. CT of the wrist completed due to wrist pain to evaluate for scaphoid fx: negative for acute fx. Patient was evaluated postoperatively by physical and occupational therapists for weight bearing as tolerated ambulation in cervical collar, and advised that patient would benefit from admission to rehab facility. Physical medicine and rehab team evaluated and agreed with the recommendations. Discharge and Orthopedic Care instructions were delineated in the Discharge Plan and reviewed with the patient per documentation. All medications were delineated in the medication reconciliation tool and key points were reviewed with the patient per documentation. Patient was deemed stable from an Orthopedic & medical standpoint at prior facility and was discharge on 3/16/24 with no new reported neuro deficits to be admitted at [facility].  (16 Mar 2024 12:47)    TDD 3/29 home  ___________________________________________________________________________    SUBJECTIVE/ROS  Patient was seen and evaluated at bedside, comfortable in bed   Patient is not happy with changing her pain meds and Flexeril to PRN  Discussed with patient risks and benifits, understood  Reported no overnight events and is in no acute distress.  Sleeping well, no new complaints  No issues with bowel and bladder, LBM (03/23)    Patient is motivated to participate on the recommended rehabilitation program.  Denies any CP, SOB, PULLIAM, palpitations, fever, chills, body aches, cough, congestion, or any other symptoms at this time.   ___________________________________________________________________________    Vital Signs Last 24 Hrs  T(C): 36.5 (24 Mar 2024 08:10), Max: 36.8 (23 Mar 2024 20:30)  T(F): 97.7 (24 Mar 2024 08:10), Max: 98.3 (23 Mar 2024 20:30)  HR: 64 (24 Mar 2024 08:10) (64 - 83)  BP: 102/66 (24 Mar 2024 08:10) (102/66 - 135/83)  RR: 18 (24 Mar 2024 08:10) (18 - 18)  SpO2: 98% (24 Mar 2024 08:10) (96% - 98%)  Parameters below as of 24 Mar 2024 08:10  Patient On (Oxygen Delivery Method): room air  ___________________________________________________________________________    LABS                        12.0   7.67  )-----------( 401      ( 21 Mar 2024 06:47 )             36.9     03-21    142  |  102  |  17  ----------------------------<  97  3.9   |  27  |  0.70    Ca    9.7      21 Mar 2024 06:47    TPro  7.0  /  Alb  3.2<L>  /  TBili  0.3  /  DBili  x   /  AST  27  /  ALT  38  /  AlkPhos  74  03-21    ___________________________________________________________________________    MEDICATIONS  (STANDING):  atorvastatin 10 milliGRAM(s) Oral at bedtime  cyanocobalamin 1000 MICROGram(s) Oral daily  enoxaparin Injectable 40 milliGRAM(s) SubCutaneous every 24 hours  lidocaine   4% Patch 1 Patch Transdermal daily  lidocaine   4% Patch 1 Patch Transdermal daily  melatonin 3 milliGRAM(s) Oral at bedtime  multivitamin 1 Tablet(s) Oral daily  naloxegol 12.5 milliGRAM(s) Oral daily  pantoprazole    Tablet 40 milliGRAM(s) Oral before breakfast  polyethylene glycol 3350 17 Gram(s) Oral daily  senna 2 Tablet(s) Oral at bedtime  tiZANidine 4 milliGRAM(s) Oral three times a day    MEDICATIONS  (PRN):  acetaminophen     Tablet .. 650 milliGRAM(s) Oral every 6 hours PRN Mild Pain (1 - 3)  benzocaine/menthol Lozenge 1 Lozenge Oral every 2 hours PRN Sore Throat  bisacodyl Suppository 10 milliGRAM(s) Rectal daily PRN Constipation  oxyCODONE    IR 5 milliGRAM(s) Oral every 4 hours PRN Moderate Pain (4 - 6)  oxyCODONE    IR 10 milliGRAM(s) Oral every 4 hours PRN Severe Pain (7 - 10)    ___________________________________________________________________________    PHYSICAL EXAM:  Constitutional - NAD, Comfortable  HEENT - NCAT, EOMI, PERRLA  Chest - Breathing comfortably, No wheezing  Cardiovascular - S1S2   Abdomen - Soft  Extremities - No C/C/E, No calf tenderness   Neurologic Exam -                    Cognitive - AAO to self, place, date, year, situation     Communication - Fluent, No dysarthria     Cranial Nerves - CN 2-12 grossly intact     Motor -                     LEFT    UE - ShAB 5/5, EF 5/5, EE 5/5, WE 5/5,  5/5                    RIGHT UE - ShAB 3/5, EF 3/5, EE 3/5, WE 3/5,  3/5                    LEFT    LE - HF 5/5, KE 5/5, DF 5/5, PF 5/5                    RIGHT LE - HF 4/5, KE 4/5, DF 4/5, PF 4/5        Sensory - slightly decreased to light touch in all 4 extremities     Coordination - FTN intact on L, slowed ability on R   Psychiatric - Mood stable, Affect WNL    ___________________________________________________________________________

## 2024-03-24 NOTE — PROGRESS NOTE ADULT - ASSESSMENT
Mrs. Ai Dumont is a 63-year-old female patient with past medical history of RYGB (Anders-en-Y gastric bypass), hypertension, hyperlipidemia, depression, and social EtOH use who is admitted for Acute Inpatient Rehabilitation with a multidisciplinary rehab program at [facility] with functional impairments in ADLs and mobility secondary to a traumatic cervical spinal cord injury after a syncope and fall with loss of consciousness.    Traumatic cervical spinal cord injury  - Central cord syndrome  - Severe central canal stenosis and cord compression at C3-C4, C4-C5 and C5-C6 with C5 and C6 fractures  - S/P C3-7 decompression w/ posterior spinal fusion by Neurosurgery and muscle flap reconstruction by Plastic Surgery on 3/8/24       * Patient to remain in C collar at all times        * Keep surgical dressing clean and dry, have dressing/sutures removed and steri-strips applied post operative day #13 (3/22/24)  - Traumatic brain injury with prolonged loss of consciousness  - Generalized weakness - UEs > LEs  - Impaired ADLs and mobility  - Need for assistance with personal care  - Continue Comprehensive Multidisciplinary Rehab Program:       * PT to focus on BUE paresis in setting of cord compression working on strengthening of extremities as well as transfers and gait training       * OT to focus on functional deficits in setting of BUE paresis  - Pain regimen as below    HTN  - enalapril 5mg daily  - Monitor BP, (03/23) 85/69 - 130/84, (03/24) 102/66 - 135/83  - Btter after changing pain meds to PRN    HLD  - atorvastatin 10mg daily    Pain  - Tylenol 650 mg q 6 hours PRN, Oxycodone 5 mg, 10 mg po q 4 hours prn.   - D/C Diluded and Tramadol   - tizanidine standing 3/20.   - lidocaine patches bilateral shoulders  - Avoid sedating medications that may interfere with cognitive recovery    Sleep  - Maintain quiet hours and a low stim environment.   - Melatonin PRN     GI / Bowel  s/p Anders en Y bypass  - Senna qHS  - Miralax Daily  - GI ppx: protonix  - naloxegol 12.5mg daily     / Bladder  - Currently patient voids independently   - bladder scan once on admission with straight cath for >400cc.    Skin / Pressure injury assessment  - Skin assessment on admission performed   - Monitor Incisions:  posterior cervical incision site  - Remove dressing and staples tomorrow 3/22.   - Turn q2 hours in bed while awake, air mattress  - nursing to monitor skin qShift  - soft heel protectors  - skin barrier cream PRN    Diet:  - Diet Consistency: DASH  - Supplements: b12 and MVI  - Aspiration Precautions  - Nutrition consult    DVT prophylaxis:   - Lovenox    Outpatient Follow-up:  Pasha Layton  Orthopaedic Surgery  [address] [place] [address], [address]  [place] [address]  Phone: [phone]  Fax: [phone]  Follow Up Time: 2 weeks    f/u Pain management    Code Status/Emergency Contact:  Full Code     Steven - spouse - [phone]  PCP is Dr. Beckman  [facility] [place] [facility].   ---------------

## 2024-03-24 NOTE — PROGRESS NOTE ADULT - NSPROGADDITIONALINFOA_GEN_ALL_CORE
Spent 35 minutes on face-face visit, evaluate, examine and treat
Spent 35 minutes on face-face visit, evaluate, examine and treat

## 2024-03-24 NOTE — PROGRESS NOTE ADULT - SUBJECTIVE AND OBJECTIVE BOX
PROGRESS NOTE:     Patient is a 63y old  Female who presents with a chief complaint of Traumatic cervical spinal cord injury (22 Mar 2024 15:22)          SUBJECTIVE & OBJECTIVE:   Pt seen and examined at bedside in AM.   no overnight events.       REVIEW OF SYSTEMS: remaining ROS negative     PHYSICAL EXAM:  VITALS:  Vital Signs Last 24 Hrs  T(C): 36.5 (24 Mar 2024 08:10), Max: 36.8 (23 Mar 2024 20:30)  T(F): 97.7 (24 Mar 2024 08:10), Max: 98.3 (23 Mar 2024 20:30)  HR: 64 (24 Mar 2024 08:10) (64 - 83)  BP: 102/66 (24 Mar 2024 08:10) (102/66 - 135/83)  BP(mean): --  RR: 18 (24 Mar 2024 08:10) (18 - 18)  SpO2: 98% (24 Mar 2024 08:10) (96% - 98%)    Parameters below as of 24 Mar 2024 08:10  Patient On (Oxygen Delivery Method): room air          GENERAL: NAD,  no increased WOB  HEAD:  Atraumatic, Normocephalic  EYES: EOMI, conjunctiva and sclera clear  ENMT: Moist mucous membranes  NECK: C-collar in place  NERVOUS SYSTEM:  Alert & Oriented   CHEST/LUNG: Clear to auscultation bilaterally; No rales, rhonchi, wheezing  HEART: Regular rate and rhythm  ABDOMEN: Soft, Nontender, Nondistended; Bowel sounds present  EXTREMITIES:  No clubbing, cyanosis, calf tenderness or edema b/l      MEDICATIONS  (STANDING):  atorvastatin 10 milliGRAM(s) Oral at bedtime  cyanocobalamin 1000 MICROGram(s) Oral daily  enoxaparin Injectable 40 milliGRAM(s) SubCutaneous every 24 hours  lidocaine   4% Patch 1 Patch Transdermal daily  lidocaine   4% Patch 1 Patch Transdermal daily  melatonin 3 milliGRAM(s) Oral at bedtime  multivitamin 1 Tablet(s) Oral daily  naloxegol 12.5 milliGRAM(s) Oral daily  pantoprazole    Tablet 40 milliGRAM(s) Oral before breakfast  polyethylene glycol 3350 17 Gram(s) Oral daily  senna 2 Tablet(s) Oral at bedtime  tiZANidine 4 milliGRAM(s) Oral three times a day    MEDICATIONS  (PRN):  acetaminophen     Tablet .. 650 milliGRAM(s) Oral every 6 hours PRN Mild Pain (1 - 3)  benzocaine/menthol Lozenge 1 Lozenge Oral every 2 hours PRN Sore Throat  bisacodyl Suppository 10 milliGRAM(s) Rectal daily PRN Constipation  oxyCODONE    IR 5 milliGRAM(s) Oral every 4 hours PRN Moderate Pain (4 - 6)  oxyCODONE    IR 10 milliGRAM(s) Oral every 4 hours PRN Severe Pain (7 - 10)      Allergies    No Known Allergies    Intolerances              LABS:                 CAPILLARY BLOOD GLUCOSE                    RECENT CULTURES:          RADIOLOGY & ADDITIONAL TESTS:        COORDINATION OF CARE:  Care Discussed with Consultants/Other Providers:

## 2024-03-25 LAB
ALBUMIN SERPL ELPH-MCNC: 3.2 G/DL — LOW (ref 3.3–5)
ALP SERPL-CCNC: 71 U/L — SIGNIFICANT CHANGE UP (ref 40–120)
ALT FLD-CCNC: 47 U/L — HIGH (ref 10–45)
ANION GAP SERPL CALC-SCNC: 10 MMOL/L — SIGNIFICANT CHANGE UP (ref 5–17)
AST SERPL-CCNC: 46 U/L — HIGH (ref 10–40)
BASOPHILS # BLD AUTO: 0.07 K/UL — SIGNIFICANT CHANGE UP (ref 0–0.2)
BASOPHILS NFR BLD AUTO: 1.2 % — SIGNIFICANT CHANGE UP (ref 0–2)
BILIRUB SERPL-MCNC: 0.3 MG/DL — SIGNIFICANT CHANGE UP (ref 0.2–1.2)
BUN SERPL-MCNC: 17 MG/DL — SIGNIFICANT CHANGE UP (ref 7–23)
CALCIUM SERPL-MCNC: 9.3 MG/DL — SIGNIFICANT CHANGE UP (ref 8.4–10.5)
CHLORIDE SERPL-SCNC: 105 MMOL/L — SIGNIFICANT CHANGE UP (ref 96–108)
CO2 SERPL-SCNC: 28 MMOL/L — SIGNIFICANT CHANGE UP (ref 22–31)
CREAT SERPL-MCNC: 0.78 MG/DL — SIGNIFICANT CHANGE UP (ref 0.5–1.3)
EGFR: 87 ML/MIN/1.73M2 — SIGNIFICANT CHANGE UP
EOSINOPHIL # BLD AUTO: 0.23 K/UL — SIGNIFICANT CHANGE UP (ref 0–0.5)
EOSINOPHIL NFR BLD AUTO: 3.9 % — SIGNIFICANT CHANGE UP (ref 0–6)
GLUCOSE SERPL-MCNC: 92 MG/DL — SIGNIFICANT CHANGE UP (ref 70–99)
HCT VFR BLD CALC: 39.4 % — SIGNIFICANT CHANGE UP (ref 34.5–45)
HGB BLD-MCNC: 12.4 G/DL — SIGNIFICANT CHANGE UP (ref 11.5–15.5)
IMM GRANULOCYTES NFR BLD AUTO: 0.3 % — SIGNIFICANT CHANGE UP (ref 0–0.9)
LYMPHOCYTES # BLD AUTO: 1.68 K/UL — SIGNIFICANT CHANGE UP (ref 1–3.3)
LYMPHOCYTES # BLD AUTO: 28.5 % — SIGNIFICANT CHANGE UP (ref 13–44)
MCHC RBC-ENTMCNC: 31.5 GM/DL — LOW (ref 32–36)
MCHC RBC-ENTMCNC: 31.5 PG — SIGNIFICANT CHANGE UP (ref 27–34)
MCV RBC AUTO: 100 FL — SIGNIFICANT CHANGE UP (ref 80–100)
MONOCYTES # BLD AUTO: 0.58 K/UL — SIGNIFICANT CHANGE UP (ref 0–0.9)
MONOCYTES NFR BLD AUTO: 9.8 % — SIGNIFICANT CHANGE UP (ref 2–14)
NEUTROPHILS # BLD AUTO: 3.32 K/UL — SIGNIFICANT CHANGE UP (ref 1.8–7.4)
NEUTROPHILS NFR BLD AUTO: 56.3 % — SIGNIFICANT CHANGE UP (ref 43–77)
NRBC # BLD: 0 /100 WBCS — SIGNIFICANT CHANGE UP (ref 0–0)
PLATELET # BLD AUTO: 384 K/UL — SIGNIFICANT CHANGE UP (ref 150–400)
POTASSIUM SERPL-MCNC: 3.9 MMOL/L — SIGNIFICANT CHANGE UP (ref 3.5–5.3)
POTASSIUM SERPL-SCNC: 3.9 MMOL/L — SIGNIFICANT CHANGE UP (ref 3.5–5.3)
PROT SERPL-MCNC: 6.9 G/DL — SIGNIFICANT CHANGE UP (ref 6–8.3)
RBC # BLD: 3.94 M/UL — SIGNIFICANT CHANGE UP (ref 3.8–5.2)
RBC # FLD: 13.2 % — SIGNIFICANT CHANGE UP (ref 10.3–14.5)
SODIUM SERPL-SCNC: 143 MMOL/L — SIGNIFICANT CHANGE UP (ref 135–145)
WBC # BLD: 5.9 K/UL — SIGNIFICANT CHANGE UP (ref 3.8–10.5)
WBC # FLD AUTO: 5.9 K/UL — SIGNIFICANT CHANGE UP (ref 3.8–10.5)

## 2024-03-25 PROCEDURE — 99232 SBSQ HOSP IP/OBS MODERATE 35: CPT

## 2024-03-25 RX ORDER — OXYCODONE HYDROCHLORIDE 5 MG/1
5 TABLET ORAL EVERY 4 HOURS
Refills: 0 | Status: DISCONTINUED | OUTPATIENT
Start: 2024-03-25 | End: 2024-03-29

## 2024-03-25 RX ORDER — TRAMADOL HYDROCHLORIDE 50 MG/1
50 TABLET ORAL EVERY 6 HOURS
Refills: 0 | Status: DISCONTINUED | OUTPATIENT
Start: 2024-03-25 | End: 2024-03-29

## 2024-03-25 RX ORDER — ACETAMINOPHEN 500 MG
975 TABLET ORAL EVERY 8 HOURS
Refills: 0 | Status: DISCONTINUED | OUTPATIENT
Start: 2024-03-25 | End: 2024-03-29

## 2024-03-25 RX ORDER — HYDROMORPHONE HYDROCHLORIDE 2 MG/ML
2 INJECTION INTRAMUSCULAR; INTRAVENOUS; SUBCUTANEOUS EVERY 6 HOURS
Refills: 0 | Status: DISCONTINUED | OUTPATIENT
Start: 2024-03-25 | End: 2024-03-29

## 2024-03-25 RX ADMIN — OXYCODONE HYDROCHLORIDE 10 MILLIGRAM(S): 5 TABLET ORAL at 09:12

## 2024-03-25 RX ADMIN — TIZANIDINE 4 MILLIGRAM(S): 4 TABLET ORAL at 14:31

## 2024-03-25 RX ADMIN — TRAMADOL HYDROCHLORIDE 50 MILLIGRAM(S): 50 TABLET ORAL at 18:54

## 2024-03-25 RX ADMIN — HYDROMORPHONE HYDROCHLORIDE 2 MILLIGRAM(S): 2 INJECTION INTRAMUSCULAR; INTRAVENOUS; SUBCUTANEOUS at 17:55

## 2024-03-25 RX ADMIN — LIDOCAINE 1 PATCH: 4 CREAM TOPICAL at 15:38

## 2024-03-25 RX ADMIN — HYDROMORPHONE HYDROCHLORIDE 2 MILLIGRAM(S): 2 INJECTION INTRAMUSCULAR; INTRAVENOUS; SUBCUTANEOUS at 23:03

## 2024-03-25 RX ADMIN — Medication 975 MILLIGRAM(S): at 16:50

## 2024-03-25 RX ADMIN — LIDOCAINE 1 PATCH: 4 CREAM TOPICAL at 18:53

## 2024-03-25 RX ADMIN — Medication 1 TABLET(S): at 12:34

## 2024-03-25 RX ADMIN — TIZANIDINE 4 MILLIGRAM(S): 4 TABLET ORAL at 21:50

## 2024-03-25 RX ADMIN — TIZANIDINE 4 MILLIGRAM(S): 4 TABLET ORAL at 06:20

## 2024-03-25 RX ADMIN — OXYCODONE HYDROCHLORIDE 10 MILLIGRAM(S): 5 TABLET ORAL at 03:30

## 2024-03-25 RX ADMIN — Medication 975 MILLIGRAM(S): at 15:39

## 2024-03-25 RX ADMIN — OXYCODONE HYDROCHLORIDE 10 MILLIGRAM(S): 5 TABLET ORAL at 08:16

## 2024-03-25 RX ADMIN — ENOXAPARIN SODIUM 40 MILLIGRAM(S): 100 INJECTION SUBCUTANEOUS at 19:46

## 2024-03-25 RX ADMIN — OXYCODONE HYDROCHLORIDE 10 MILLIGRAM(S): 5 TABLET ORAL at 02:54

## 2024-03-25 RX ADMIN — Medication 975 MILLIGRAM(S): at 21:50

## 2024-03-25 RX ADMIN — TRAMADOL HYDROCHLORIDE 50 MILLIGRAM(S): 50 TABLET ORAL at 12:57

## 2024-03-25 RX ADMIN — TRAMADOL HYDROCHLORIDE 50 MILLIGRAM(S): 50 TABLET ORAL at 13:33

## 2024-03-25 RX ADMIN — NALOXEGOL OXALATE 12.5 MILLIGRAM(S): 12.5 TABLET, FILM COATED ORAL at 14:31

## 2024-03-25 RX ADMIN — Medication 3 MILLIGRAM(S): at 21:47

## 2024-03-25 RX ADMIN — PANTOPRAZOLE SODIUM 40 MILLIGRAM(S): 20 TABLET, DELAYED RELEASE ORAL at 06:20

## 2024-03-25 RX ADMIN — PREGABALIN 1000 MICROGRAM(S): 225 CAPSULE ORAL at 12:34

## 2024-03-25 RX ADMIN — OXYCODONE HYDROCHLORIDE 5 MILLIGRAM(S): 5 TABLET ORAL at 12:34

## 2024-03-25 RX ADMIN — OXYCODONE HYDROCHLORIDE 5 MILLIGRAM(S): 5 TABLET ORAL at 13:33

## 2024-03-25 RX ADMIN — HYDROMORPHONE HYDROCHLORIDE 2 MILLIGRAM(S): 2 INJECTION INTRAMUSCULAR; INTRAVENOUS; SUBCUTANEOUS at 18:49

## 2024-03-25 RX ADMIN — LIDOCAINE 1 PATCH: 4 CREAM TOPICAL at 15:39

## 2024-03-25 RX ADMIN — Medication 975 MILLIGRAM(S): at 22:50

## 2024-03-25 RX ADMIN — ATORVASTATIN CALCIUM 10 MILLIGRAM(S): 80 TABLET, FILM COATED ORAL at 21:50

## 2024-03-25 NOTE — PROGRESS NOTE ADULT - ASSESSMENT
Mrs. Ai Dumont is a 63-year-old female patient with past medical history of RYGB (Anders-en-Y gastric bypass), hypertension, hyperlipidemia, depression, and social EtOH use who is admitted for Acute Inpatient Rehabilitation with a multidisciplinary rehab program at [facility] with functional impairments in ADLs and mobility secondary to a traumatic cervical spinal cord injury after a syncope and fall with loss of consciousness.    Traumatic cervical spinal cord injury  - Central cord syndrome  - Severe central canal stenosis and cord compression at C3-C4, C4-C5 and C5-C6 with C5 and C6 fractures  - S/P C3-7 decompression w/ posterior spinal fusion by Neurosurgery and muscle flap reconstruction by Plastic Surgery on 3/8/24       * Patient to remain in C collar at all times        * Keep surgical dressing clean and dry, have dressing/sutures removed and steri-strips applied post operative day #13 (3/22/24)  - Traumatic brain injury with prolonged loss of consciousness  - Generalized weakness - UEs > LEs  - Impaired ADLs and mobility  - Need for assistance with personal care  - Continue Comprehensive Multidisciplinary Rehab Program:       * PT to focus on BUE paresis in setting of cord compression working on strengthening of extremities as well as transfers and gait training       * OT to focus on functional deficits in setting of BUE paresis  - Pain regimen as below    HTN  - enalapril 5mg daily  - Monitor BP    HLD  - atorvastatin 10mg daily    Pain  - Tylenol scheduled, tramadol prn, hydromorphone, and oxycodone prn.   - tizanidine standing 3/20.   - lidocaine patches bilateral shoulders  - Avoid sedating medications that may interfere with cognitive recovery    Sleep  - Maintain quiet hours and a low stim environment.   - Melatonin PRN     GI / Bowel  s/p Anders en Y bypass  - Senna qHS  - Miralax Daily  - GI ppx: protonix  - naloxegol 12.5mg daily     / Bladder  - Currently patient voids independently   - bladder scan once on admission with straight cath for >400cc.    Skin / Pressure injury assessment  - Skin assessment on admission performed   - Monitor Incisions:  posterior cervical incision site  -- will plan to remove dressing and staples tomorrow 3/22.   - Turn q2 hours in bed while awake, air mattress  - nursing to monitor skin qShift  - soft heel protectors  - skin barrier cream PRN    Diet/Dysphagia assessment:  - Diet Consistency: DASH  - Supplements: b12 and MVI  - Aspiration Precautions  - Nutrition consult    DVT prophylaxis:   - Lovenox    Outpatient Follow-up:  Pasha Layton  Orthopaedic Surgery  [address], [address]  [place] [address]  Phone: [phone]  Fax: [phone]  Follow Up Time: 2 weeks    f/u Pain management    Code Status/Emergency Contact:  Full Code     Steven - spouse - [phone]  PCP is Dr. Beckman  Patient uses [facility] in [place] as her preferred pharmacy.   ---------------

## 2024-03-25 NOTE — PROGRESS NOTE ADULT - ASSESSMENT
Mrs. Ai Dumont is a 63-year-old female patient with past medical history of RYGB (Anders-en-Y gastric bypass), hypertension, hyperlipidemia, depression, and social EtOH use who is admitted for Acute Inpatient Rehabilitation with a multidisciplinary rehab program at [facility] with functional impairments in ADLs and mobility secondary to a traumatic cervical spinal cord injury after a syncope and fall with loss of consciousness.    Traumatic cervical spinal cord injury  - Central cord syndrome  - Severe central canal stenosis and cord compression at C3-C4, C4-C5 and C5-C6 with C5 and C6 fractures  - S/P C3-7 decompression w/ posterior spinal fusion by Neurosurgery and muscle flap reconstruction by Plastic Surgery on 3/8/24       * Patient to remain in C collar at all times        * Keep surgical dressing clean and dry, have dressing/sutures removed and steri-strips applied post operative day #13 (3/22/24)  - Traumatic brain injury with prolonged loss of consciousness  - Generalized weakness - UEs > LEs  - Impaired ADLs and mobility  - Need for assistance with personal care  - Continue Comprehensive Multidisciplinary Rehab Program:       * PT to focus on BUE paresis in setting of cord compression working on strengthening of extremities as well as transfers and gait training       * OT to focus on functional deficits in setting of BUE paresis  - Pain regimen as below    HTN  - enalapril 5mg daily  - Monitor BP    HLD  - atorvastatin 10mg daily    Pain  - Tylenol 975q8h scheduled,  - hydromorphone 4mg decreased to 2mg q6 for hypotension.    - tramadol prn moderate, and oxycodone 5mg prn.   - tizanidine standing 3/20.   - lidocaine patches bilateral shoulders    Sleep  - Maintain quiet hours and a low stim environment.   - Melatonin PRN     GI / Bowel  s/p Anders en Y bypass  - Senna qHS  - Miralax Daily  - GI ppx: protonix  - naloxegol 12.5mg daily     / Bladder  - Currently patient voids independently   - bladder scan once on admission with straight cath for >400cc.    Skin / Pressure injury assessment  - Skin assessment on admission performed   - Monitor Incisions:  posterior cervical incision site  -- removed dressing and staples 3/22.   - Turn q2 hours in bed while awake, air mattress  - nursing to monitor skin qShift  - soft heel protectors  - skin barrier cream PRN    Diet:  - Diet Consistency: DASH  - Supplements: b12 and MVI  - Aspiration Precautions  - Nutrition consult    DVT prophylaxis:   - Lovenox    Outpatient Follow-up:  Pasha Layton  Orthopaedic Surgery  [address], [address]  [place] [address]  Phone: [phone]  Fax: [phone]  Follow Up Time: 2 weeks    f/u Pain management    Code Status/Emergency Contact:  Full Code     Steven - spouse - [phone]  PCP is Dr. Beckman  Patient uses [facility] in [place] as her preferred pharmacy.   ---------------   Mrs. Ai Dumont is a 63-year-old female patient with past medical history of RYGB (Anders-en-Y gastric bypass), hypertension, hyperlipidemia, depression, and social EtOH use who is admitted for Acute Inpatient Rehabilitation with a multidisciplinary rehab program at [facility] with functional impairments in ADLs and mobility secondary to a traumatic cervical spinal cord injury after a syncope and fall with loss of consciousness.    Traumatic cervical spinal cord injury  - Central cord syndrome  - Severe central canal stenosis and cord compression at C3-C4, C4-C5 and C5-C6 with C5 and C6 fractures  - S/P C3-7 decompression w/ posterior spinal fusion by Neurosurgery and muscle flap reconstruction by Plastic Surgery on 3/8/24       * Patient to remain in C collar at all times        * Keep surgical dressing clean and dry, have dressing/sutures removed and steri-strips applied post operative day #13 (3/22/24)  - Traumatic brain injury with prolonged loss of consciousness  - Generalized weakness - UEs > LEs  - Impaired ADLs and mobility  - Need for assistance with personal care  - Continue Comprehensive Multidisciplinary Rehab Program:       * PT to focus on BUE paresis in setting of cord compression working on strengthening of extremities as well as transfers and gait training       * OT to focus on functional deficits in setting of BUE paresis  - Pain regimen as below    HTN  - enalapril 5mg daily  - Monitor BP    HLD  - atorvastatin 10mg daily    Pain  - Tylenol 975q8h scheduled,  - hydromorphone 4mg decreased to 2mg q6 for with holding parameters for hypotension.    - tramadol prn moderate, and oxycodone 5mg prn.   - tizanidine standing 3/20.   - lidocaine patches bilateral shoulders    Sleep  - Maintain quiet hours and a low stim environment.   - Melatonin PRN     GI / Bowel  s/p Anders en Y bypass  - Senna qHS  - Miralax Daily  - GI ppx: protonix  - naloxegol 12.5mg daily     / Bladder  - Currently patient voids independently   - bladder scan once on admission with straight cath for >400cc.    Skin / Pressure injury assessment  - Skin assessment on admission performed   - Monitor Incisions:  posterior cervical incision site  -- removed dressing and staples 3/22.   - Turn q2 hours in bed while awake, air mattress  - nursing to monitor skin qShift  - soft heel protectors  - skin barrier cream PRN    Diet:  - Diet Consistency: DASH  - Supplements: b12 and MVI  - Aspiration Precautions  - Nutrition consult    DVT prophylaxis:   - Lovenox    Outpatient Follow-up:  Pasha Layton  Orthopaedic Surgery  [address], [address]  [place] [address]  Phone: [phone]  Fax: [phone]  Follow Up Time: 2 weeks    f/u Pain management    Code Status/Emergency Contact:  Full Code     Steven - spouse - [phone]  PCP is Dr. Beckman  Patient uses [facility] in [place] as her preferred pharmacy.   ---------------

## 2024-03-25 NOTE — PROGRESS NOTE ADULT - ASSESSMENT
64 y/o F with PMH of RYGB (Anders-en-Y gastric bypass), hypertension, hyperlipidemia, depression, and social EtOH use who is admitted for Acute Inpatient Rehabilitation with a multidisciplinary rehab program at [facility] with functional impairments in ADLs and mobility secondary to a traumatic cervical spinal cord injury after a syncope and fall with loss of consciousness.    #Cervical Cord Syndrome  #Severe C3-6 Canal Stenosis  -s/p C3-7 decompression w/ posterior spinal fusion and muscle flap reconstruction on 3/9  C collar   -Keep surgical dressing clean and dry, have dressing/sutures removed and steri-strips applied post operative day#13 - 3/22  -Continue comprehensive rehab program - PT/OT/SLP per rehab team  -Pain management, bowel regimen per rehab   #Syncope  -Unclear cause   -CTH/CTA Head and Neck were unremarkable  -Echo 3/8 showed grossly normal EF, no significant valvular disease   -s/p telemetry monitoring during her hospitalization   -Will monitor     #HTN  #Now Hypotensive, secondary to pain meds  -Meds held due to low bp  -Adjust pain meds  -check OH periodically    #HLD  -Continue atorvastatin 10mg daily    #s/p Anders en Y bypass  #GERD  -PPI     Moderate protein-calorie malnutrition.  nutrition on board    #DVT prophylaxis:   - Lovenox SC       64 y/o F with PMH of RYGB (Anders-en-Y gastric bypass), hypertension, hyperlipidemia, depression, and social EtOH use who is admitted for Acute Inpatient Rehabilitation with a multidisciplinary rehab program at [facility] with functional impairments in ADLs and mobility secondary to a traumatic cervical spinal cord injury after a syncope and fall with loss of consciousness.    #Cervical Cord Syndrome  #Severe C3-6 Canal Stenosis  -s/p C3-7 decompression w/ posterior spinal fusion and muscle flap reconstruction on 3/9  C collar   -Continue comprehensive rehab program - PT/OT/SLP per rehab team  -Pain management, bowel regimen per rehab     #Syncope, unclear etiology  -CTH/CTA Head and Neck unremarkable  -Echo 3/8 showed grossly normal EF, no significant valvular disease   -s/p telemetry monitoring during her hospitalization   -Will monitor     #HTN  #Now Hypotensive, secondary to pain meds  -Meds held due to low bp  -Adjust pain meds  -Check OH periodically    #HLD  -Continue atorvastatin 10mg daily    #s/p Anders en Y bypass  #GERD  -PPI     #Moderate protein-calorie malnutrition  -Dietary/Nutrition following    #DVT prophylaxis - Lovenox SC

## 2024-03-25 NOTE — PROGRESS NOTE ADULT - SUBJECTIVE AND OBJECTIVE BOX
Patient is a 63y old  Female who presents with a chief complaint of Traumatic cervical spinal cord injury (25 Mar 2024 07:41)    Patient seen and examined at bedside. Denies acute medical complaints.      ALLERGIES:  No Known Allergies    MEDICATIONS  (STANDING):  atorvastatin 10 milliGRAM(s) Oral at bedtime  cyanocobalamin 1000 MICROGram(s) Oral daily  enoxaparin Injectable 40 milliGRAM(s) SubCutaneous every 24 hours  lidocaine   4% Patch 1 Patch Transdermal daily  lidocaine   4% Patch 1 Patch Transdermal daily  melatonin 3 milliGRAM(s) Oral at bedtime  multivitamin 1 Tablet(s) Oral daily  naloxegol 12.5 milliGRAM(s) Oral daily  pantoprazole    Tablet 40 milliGRAM(s) Oral before breakfast  polyethylene glycol 3350 17 Gram(s) Oral daily  senna 2 Tablet(s) Oral at bedtime  tiZANidine 4 milliGRAM(s) Oral three times a day    MEDICATIONS  (PRN):  acetaminophen     Tablet .. 650 milliGRAM(s) Oral every 6 hours PRN Mild Pain (1 - 3)  benzocaine/menthol Lozenge 1 Lozenge Oral every 2 hours PRN Sore Throat  bisacodyl Suppository 10 milliGRAM(s) Rectal daily PRN Constipation  oxyCODONE    IR 10 milliGRAM(s) Oral every 4 hours PRN Severe Pain (7 - 10)  oxyCODONE    IR 5 milliGRAM(s) Oral every 4 hours PRN Moderate Pain (4 - 6)    Vital Signs Last 24 Hrs  T(F): 97.6 (25 Mar 2024 08:03), Max: 98 (24 Mar 2024 20:14)  HR: 65 (25 Mar 2024 08:03) (65 - 74)  BP: 107/70 (25 Mar 2024 08:03) (107/70 - 127/79)  RR: 16 (25 Mar 2024 08:03) (16 - 16)  SpO2: 98% (25 Mar 2024 08:03) (96% - 98%)  I&O's Summary    24 Mar 2024 07:01  -  25 Mar 2024 07:00  --------------------------------------------------------  IN: 0 mL / OUT: 2 mL / NET: -2 mL      BMI (kg/m2): 26.3 (03-20-24 @ 15:12)    PHYSICAL EXAM:  General: NAD, A/O x 3  ENT: MMM, no scleral icterus  Neck: +c collar  Lungs: Clear to auscultation bilaterally, no wheezes, rales, rhonchi  Cardio: RRR, S1/S2  Abdomen: Soft, Nontender, Nondistended; Bowel sounds present  Extremities: No calf tenderness, No pitting edema    LABS:                        12.4   5.90  )-----------( 384      ( 25 Mar 2024 07:05 )             39.4       03-25    143  |  105  |  17  ----------------------------<  92  3.9   |  28  |  0.78    Ca    9.3      25 Mar 2024 07:05    TPro  6.9  /  Alb  3.2  /  TBili  0.3  /  DBili  x   /  AST  46  /  ALT  47  /  AlkPhos  71  03-25                                  Urinalysis Basic - ( 25 Mar 2024 07:05 )    Color: x / Appearance: x / SG: x / pH: x  Gluc: 92 mg/dL / Ketone: x  / Bili: x / Urobili: x   Blood: x / Protein: x / Nitrite: x   Leuk Esterase: x / RBC: x / WBC x   Sq Epi: x / Non Sq Epi: x / Bacteria: x            RADIOLOGY & ADDITIONAL TESTS:    Care Discussed with Consultants/Other Providers: yes, rehab

## 2024-03-25 NOTE — PROGRESS NOTE ADULT - SUBJECTIVE AND OBJECTIVE BOX
HPI:  Mrs. Ai Dumont is a 63-year-old female patient with past medical history of RYGB (Anders-en-Y gastric bypass), hypertension, hyperlipidemia, depression, social EtOH use, who presented to the ED prior to admission after an episode of syncope and fall. She lost consciousness without warning nor with a preceding aura. No prior history of similar episodes. She regained consciousness some time later (unsure about duration) around 9pm but reported feeling too weak to steady herself or get up. She laid on the carpeted floor from 9PM through 7:30AM the following morning before she regained strength to take herself downstairs and call for help. She reported bilateral upper extremity pain, mostly located in the shoulders, right thumb pain, and upper neck pain. She had new onset bilateral upper extremity weakness more profound on the right side. She has since remained alert and oriented. Workup performed demonstrated C5-C6 cervical fractures with no other injuries. Patient was admitted to [facility] on 3/8/24 for surgical management of a cervical spine injury following syncopal fall. CT of the cervical spine reported acute anterior inferior endplate vertebral body fractures of C5 and C6 vertebrae. MR of cervical spine reported advanced multilevel degenerative changes contributing to high-grade multilevel central canal stenosis noting severe central canal stenosis and cord compression at C3-C4, C4-C5 and C5-C6. Following medical and cardiology clearance and optimization, patient was taken to the OR and underwent C3-C7 decompression, posterior spinal fusion with muscle flap reconstruction on 3/9/2024 with Dr. Layton. Plastic surgery consulted intraop to evaluate patient for muscle flap reconstruction of posterior spine wound given wide exposure of spine structures, need for bilateral multilevel hardware placement, use of bone graft requiring healthy vascularized muscle flap coverage of exposed vital structures and extensive foreign body. Patient tolerated procedure well. Patient was placed in hard cervical collar post op. Drain placed intraoperatively by Plastic Surgery team with outputs to be monitored q shift. Drain pulled on 3/14/24 successfully. Patient transferred to the SICU post operatively for q1 neuro checks with eventual downgrade to the floor on 3/13/24. Patient placed on PCA for acute pain control, which was discontinued on 3/10/24 with transition to PO analgesics. CT of the wrist completed due to wrist pain to evaluate for scaphoid fx: negative for acute fx. Patient was evaluated postoperatively by physical and occupational therapists for weight bearing as tolerated ambulation in cervical collar, and advised that patient would benefit from admission to rehab facility. Physical medicine and rehab team evaluated and agreed with the recommendations. Discharge and Orthopedic Care instructions were delineated in the Discharge Plan and reviewed with the patient per documentation. All medications were delineated in the medication reconciliation tool and key points were reviewed with the patient per documentation. Patient was deemed stable from an Orthopedic & medical standpoint at prior facility and was discharge on 3/16/24 with no new reported neuro deficits to be admitted at [facility].  (16 Mar 2024 12:47)    TDD 3/29 home  ___________________________________________________________________________    SUBJECTIVE/ROS  Patient was seen and evaluated at bedside and at therapy today.  Reported no overnight events. Over the weekend her Dilaudid was discontinued due to hypotension, and as a result she experienced pain that affected her sleep.   Patient is motivated to participate on the recommended rehabilitation program.  Denies any CP, SOB, PULLIAM, palpitations, fever, chills, body aches, cough, congestion, or any other symptoms at this time.   ___________________________________________________________________________    Vital Signs Last 24 Hrs  T(C): 36.4 (25 Mar 2024 08:03), Max: 36.7 (24 Mar 2024 20:14)  T(F): 97.6 (25 Mar 2024 08:03), Max: 98 (24 Mar 2024 20:14)  HR: 65 (25 Mar 2024 08:03) (65 - 74)  BP: 107/70 (25 Mar 2024 08:03) (107/70 - 127/79)  BP(mean): --  RR: 16 (25 Mar 2024 08:03) (16 - 16)  SpO2: 98% (25 Mar 2024 08:03) (96% - 98%)    Parameters below as of 25 Mar 2024 08:03  Patient On (Oxygen Delivery Method): room air  ___________________________________________________________________________    LAB                          12.4   5.90  )-----------( 384      ( 25 Mar 2024 07:05 )             39.4     03-25    143  |  105  |  17  ----------------------------<  92  3.9   |  28  |  0.78    Ca    9.3      25 Mar 2024 07:05    TPro  6.9  /  Alb  3.2<L>  /  TBili  0.3  /  DBili  x   /  AST  46<H>  /  ALT  47<H>  /  AlkPhos  71  03-25    ___________________________________________________________________________    MEDICATIONS  (STANDING):  atorvastatin 10 milliGRAM(s) Oral at bedtime  cyanocobalamin 1000 MICROGram(s) Oral daily  enoxaparin Injectable 40 milliGRAM(s) SubCutaneous every 24 hours  lidocaine   4% Patch 1 Patch Transdermal daily  lidocaine   4% Patch 1 Patch Transdermal daily  melatonin 3 milliGRAM(s) Oral at bedtime  multivitamin 1 Tablet(s) Oral daily  naloxegol 12.5 milliGRAM(s) Oral daily  pantoprazole    Tablet 40 milliGRAM(s) Oral before breakfast  polyethylene glycol 3350 17 Gram(s) Oral daily  senna 2 Tablet(s) Oral at bedtime  tiZANidine 4 milliGRAM(s) Oral three times a day    MEDICATIONS  (PRN):  acetaminophen     Tablet .. 650 milliGRAM(s) Oral every 6 hours PRN Mild Pain (1 - 3)  benzocaine/menthol Lozenge 1 Lozenge Oral every 2 hours PRN Sore Throat  bisacodyl Suppository 10 milliGRAM(s) Rectal daily PRN Constipation  oxyCODONE    IR 10 milliGRAM(s) Oral every 4 hours PRN Severe Pain (7 - 10)  oxyCODONE    IR 5 milliGRAM(s) Oral every 4 hours PRN Moderate Pain (4 - 6)    ___________________________________________________________________________    PHYSICAL EXAM:    Constitutional - NAD, Comfortable  HEENT - NCAT, EOMI  Chest - Breathing comfortably, No wheezing  Cardiovascular - S1S2   Abdomen - Soft  Extremities - No C/C/E, No calf tenderness   Neurologic Exam -                    Cognitive - AAO to self, place, date, year, situation     Communication - Fluent, No dysarthria     Cranial Nerves - CN 2-12 grossly intact     Motor -                     LEFT    UE - ShAB 5/5, EF 5/5, EE 5/5, WE 5/5,  5/5                    RIGHT UE - ShAB 3/5, EF 3/5, EE 3/5, WE 3/5,  3/5                    LEFT    LE - HF 5/5, KE 5/5, DF 5/5, PF 5/5                    RIGHT LE - HF 4/5, KE 4/5, DF 4/5, PF 4/5        Sensory - slightly decreased to light touch in all 4 extremities     Reflexes - DTR Intact, No primitive reflexive     Coordination - FTN intact on L, slowed ability on R     OculoVestibular - No nystagmus    Psychiatric - Mood stable, Affect WNL    ___________________________________________________________________________ HPI:  Mrs. Ai Dumont is a 63-year-old female patient with past medical history of RYGB (Anders-en-Y gastric bypass), hypertension, hyperlipidemia, depression, social EtOH use, who presented to the ED prior to admission after an episode of syncope and fall. She lost consciousness without warning nor with a preceding aura. No prior history of similar episodes. She regained consciousness some time later (unsure about duration) around 9pm but reported feeling too weak to steady herself or get up. She laid on the carpeted floor from 9PM through 7:30AM the following morning before she regained strength to take herself downstairs and call for help. She reported bilateral upper extremity pain, mostly located in the shoulders, right thumb pain, and upper neck pain. She had new onset bilateral upper extremity weakness more profound on the right side. She has since remained alert and oriented. Workup performed demonstrated C5-C6 cervical fractures with no other injuries. Patient was admitted to [facility] on 3/8/24 for surgical management of a cervical spine injury following syncopal fall. CT of the cervical spine reported acute anterior inferior endplate vertebral body fractures of C5 and C6 vertebrae. MR of cervical spine reported advanced multilevel degenerative changes contributing to high-grade multilevel central canal stenosis noting severe central canal stenosis and cord compression at C3-C4, C4-C5 and C5-C6. Following medical and cardiology clearance and optimization, patient was taken to the OR and underwent C3-C7 decompression, posterior spinal fusion with muscle flap reconstruction on 3/9/2024 with Dr. Layton. Plastic surgery consulted intraop to evaluate patient for muscle flap reconstruction of posterior spine wound given wide exposure of spine structures, need for bilateral multilevel hardware placement, use of bone graft requiring healthy vascularized muscle flap coverage of exposed vital structures and extensive foreign body. Patient tolerated procedure well. Patient was placed in hard cervical collar post op. Drain placed intraoperatively by Plastic Surgery team with outputs to be monitored q shift. Drain pulled on 3/14/24 successfully. Patient transferred to the SICU post operatively for q1 neuro checks with eventual downgrade to the floor on 3/13/24. Patient placed on PCA for acute pain control, which was discontinued on 3/10/24 with transition to PO analgesics. CT of the wrist completed due to wrist pain to evaluate for scaphoid fx: negative for acute fx. Patient was evaluated postoperatively by physical and occupational therapists for weight bearing as tolerated ambulation in cervical collar, and advised that patient would benefit from admission to rehab facility. Physical medicine and rehab team evaluated and agreed with the recommendations. Discharge and Orthopedic Care instructions were delineated in the Discharge Plan and reviewed with the patient per documentation. All medications were delineated in the medication reconciliation tool and key points were reviewed with the patient per documentation. Patient was deemed stable from an Orthopedic & medical standpoint at prior facility and was discharge on 3/16/24 with no new reported neuro deficits to be admitted at [facility].  (16 Mar 2024 12:47)    TDD 3/29 home  ___________________________________________________________________________    SUBJECTIVE/ROS  Patient was seen and evaluated at bedside and at therapy today.  Reported no overnight events. Over the weekend her Dilaudid was discontinued due to hypotension, and as a result she experienced pain that affected her sleep.   Case to be reevaluated at Interdisciplinary Team meeting tomorrow.  Patient is motivated to participate on the recommended rehabilitation program.  Denies any CP, SOB, PULLIAM, palpitations, fever, chills, body aches, cough, congestion, or any other symptoms at this time.   ___________________________________________________________________________    Vital Signs Last 24 Hrs  T(C): 36.4 (25 Mar 2024 08:03), Max: 36.7 (24 Mar 2024 20:14)  T(F): 97.6 (25 Mar 2024 08:03), Max: 98 (24 Mar 2024 20:14)  HR: 65 (25 Mar 2024 08:03) (65 - 74)  BP: 107/70 (25 Mar 2024 08:03) (107/70 - 127/79)  RR: 16 (25 Mar 2024 08:03) (16 - 16)  SpO2: 98% (25 Mar 2024 08:03) (96% - 98%)    ___________________________________________________________________________    LAB                          12.4   5.90  )-----------( 384      ( 25 Mar 2024 07:05 )             39.4     03-25    143  |  105  |  17  ----------------------------<  92  3.9   |  28  |  0.78    Ca    9.3      25 Mar 2024 07:05    TPro  6.9  /  Alb  3.2<L>  /  TBili  0.3  /  DBili  x   /  AST  46<H>  /  ALT  47<H>  /  AlkPhos  71  03-25    ___________________________________________________________________________    MEDICATIONS  (STANDING):  acetaminophen     Tablet .. 975 milliGRAM(s) Oral every 8 hours  atorvastatin 10 milliGRAM(s) Oral at bedtime  cyanocobalamin 1000 MICROGram(s) Oral daily  enoxaparin Injectable 40 milliGRAM(s) SubCutaneous every 24 hours  HYDROmorphone   Tablet 2 milliGRAM(s) Oral every 6 hours  lidocaine   4% Patch 1 Patch Transdermal daily  lidocaine   4% Patch 1 Patch Transdermal daily  melatonin 3 milliGRAM(s) Oral at bedtime  multivitamin 1 Tablet(s) Oral daily  naloxegol 12.5 milliGRAM(s) Oral daily  pantoprazole    Tablet 40 milliGRAM(s) Oral before breakfast  polyethylene glycol 3350 17 Gram(s) Oral daily  senna 2 Tablet(s) Oral at bedtime  tiZANidine 4 milliGRAM(s) Oral three times a day    MEDICATIONS  (PRN):  benzocaine/menthol Lozenge 1 Lozenge Oral every 2 hours PRN Sore Throat  bisacodyl Suppository 10 milliGRAM(s) Rectal daily PRN Constipation  oxyCODONE    IR 5 milliGRAM(s) Oral every 4 hours PRN Severe Pain (7 - 10)  traMADol 50 milliGRAM(s) Oral every 6 hours PRN Moderate Pain (4 - 6)    ___________________________________________________________________________    PHYSICAL EXAM:    Constitutional - NAD, Comfortable  HEENT - NCAT, EOMI  Chest - Breathing comfortably, No wheezing  Cardiovascular - S1S2   Abdomen - Soft  Extremities - No C/C/E, No calf tenderness   Neurologic Exam -                    Cognitive - AAO to self, place, date, year, situation     Communication - Fluent, No dysarthria     Cranial Nerves - CN 2-12 grossly intact     Motor -                     LEFT    UE - ShAB 5/5, EF 5/5, EE 5/5, WE 5/5,  5/5                    RIGHT UE - ShAB 3/5, EF 3/5, EE 3/5, WE 3/5,  3/5                    LEFT    LE - HF 5/5, KE 5/5, DF 5/5, PF 5/5                    RIGHT LE - HF 4/5, KE 4/5, DF 4/5, PF 4/5        Sensory - slightly decreased to light touch in all 4 extremities     Reflexes - DTR Intact, No primitive reflexive     Coordination - FTN intact on L, slowed ability on R     OculoVestibular - No nystagmus    Psychiatric - Mood stable, Affect WNL    ___________________________________________________________________________

## 2024-03-25 NOTE — PROGRESS NOTE ADULT - SUBJECTIVE AND OBJECTIVE BOX
HPI:  Mrs. Ai Dumont is a 63-year-old female patient with past medical history of RYGB (Anders-en-Y gastric bypass), hypertension, hyperlipidemia, depression, social EtOH use, who presented to the ED prior to admission after an episode of syncope and fall. She lost consciousness without warning nor with a preceding aura. No prior history of similar episodes. She regained consciousness some time later (unsure about duration) around 9pm but reported feeling too weak to steady herself or get up. She laid on the carpeted floor from 9PM through 7:30AM the following morning before she regained strength to take herself downstairs and call for help. She reported bilateral upper extremity pain, mostly located in the shoulders, right thumb pain, and upper neck pain. She had new onset bilateral upper extremity weakness more profound on the right side. She has since remained alert and oriented. Workup performed demonstrated C5-C6 cervical fractures with no other injuries. Patient was admitted to [facility] on 3/8/24 for surgical management of a cervical spine injury following syncopal fall. CT of the cervical spine reported acute anterior inferior endplate vertebral body fractures of C5 and C6 vertebrae. MR of cervical spine reported advanced multilevel degenerative changes contributing to high-grade multilevel central canal stenosis noting severe central canal stenosis and cord compression at C3-C4, C4-C5 and C5-C6. Following medical and cardiology clearance and optimization, patient was taken to the OR and underwent C3-C7 decompression, posterior spinal fusion with muscle flap reconstruction on 3/9/2024 with Dr. Layton. Plastic surgery consulted intraop to evaluate patient for muscle flap reconstruction of posterior spine wound given wide exposure of spine structures, need for bilateral multilevel hardware placement, use of bone graft requiring healthy vascularized muscle flap coverage of exposed vital structures and extensive foreign body. Patient tolerated procedure well. Patient was placed in hard cervical collar post op. Drain placed intraoperatively by Plastic Surgery team with outputs to be monitored q shift. Drain pulled on 3/14/24 successfully. Patient transferred to the SICU post operatively for q1 neuro checks with eventual downgrade to the floor on 3/13/24. Patient placed on PCA for acute pain control, which was discontinued on 3/10/24 with transition to PO analgesics. CT of the wrist completed due to wrist pain to evaluate for scaphoid fx: negative for acute fx. Patient was evaluated postoperatively by physical and occupational therapists for weight bearing as tolerated ambulation in cervical collar, and advised that patient would benefit from admission to rehab facility. Physical medicine and rehab team evaluated and agreed with the recommendations. Discharge and Orthopedic Care instructions were delineated in the Discharge Plan and reviewed with the patient per documentation. All medications were delineated in the medication reconciliation tool and key points were reviewed with the patient per documentation. Patient was deemed stable from an Orthopedic & medical standpoint at prior facility and was discharge on 3/16/24 with no new reported neuro deficits to be admitted at [facility].  (16 Mar 2024 12:47)    TDD 3/29 home  ___________________________________________________________________________    SUBJECTIVE/ROS  Patient was seen and evaluated at bedside and at therapy today.  Reported no overnight events and is in no acute distress.  Discussed with her  and answered their questions in detail.   Dressing removed from incision and left JUAN.  Patient is motivated to participate on the recommended rehabilitation program.  Denies any CP, SOB, PULLIAM, palpitations, fever, chills, body aches, cough, congestion, or any other symptoms at this time.   ___________________________________________________________________________    Vital Signs Last 24 Hrs  T(C): 36.4 (25 Mar 2024 08:03), Max: 36.7 (24 Mar 2024 20:14)  T(F): 97.6 (25 Mar 2024 08:03), Max: 98 (24 Mar 2024 20:14)  HR: 65 (25 Mar 2024 08:03) (65 - 74)  BP: 107/70 (25 Mar 2024 08:03) (107/70 - 127/79)  RR: 16 (25 Mar 2024 08:03) (16 - 16)  SpO2: 98% (25 Mar 2024 08:03) (96% - 98%)    ___________________________________________________________________________    LAB                        12.4   5.90  )-----------( 384      ( 25 Mar 2024 07:05 )             39.4     03-25    143  |  105  |  17  ----------------------------<  92  3.9   |  28  |  0.78    Ca    9.3      25 Mar 2024 07:05    TPro  6.9  /  Alb  3.2<L>  /  TBili  0.3  /  DBili  x   /  AST  46<H>  /  ALT  47<H>  /  AlkPhos  71  03-25    LIVER FUNCTIONS - ( 25 Mar 2024 07:05 )  Alb: 3.2 g/dL / Pro: 6.9 g/dL / ALK PHOS: 71 U/L / ALT: 47 U/L / AST: 46 U/L / GGT: x           ___________________________________________________________________________    MEDICATIONS  (STANDING):  atorvastatin 10 milliGRAM(s) Oral at bedtime  cyanocobalamin 1000 MICROGram(s) Oral daily  enoxaparin Injectable 40 milliGRAM(s) SubCutaneous every 24 hours  lidocaine   4% Patch 1 Patch Transdermal daily  lidocaine   4% Patch 1 Patch Transdermal daily  melatonin 3 milliGRAM(s) Oral at bedtime  multivitamin 1 Tablet(s) Oral daily  naloxegol 12.5 milliGRAM(s) Oral daily  pantoprazole    Tablet 40 milliGRAM(s) Oral before breakfast  polyethylene glycol 3350 17 Gram(s) Oral daily  senna 2 Tablet(s) Oral at bedtime  tiZANidine 4 milliGRAM(s) Oral three times a day    MEDICATIONS  (PRN):  acetaminophen     Tablet .. 650 milliGRAM(s) Oral every 6 hours PRN Mild Pain (1 - 3)  benzocaine/menthol Lozenge 1 Lozenge Oral every 2 hours PRN Sore Throat  bisacodyl Suppository 10 milliGRAM(s) Rectal daily PRN Constipation  oxyCODONE    IR 10 milliGRAM(s) Oral every 4 hours PRN Severe Pain (7 - 10)  oxyCODONE    IR 5 milliGRAM(s) Oral every 4 hours PRN Moderate Pain (4 - 6)      MEDICATIONS  (STANDING):  acetaminophen     Tablet .. 975 milliGRAM(s) Oral every 8 hours  atorvastatin 10 milliGRAM(s) Oral at bedtime  cyanocobalamin 1000 MICROGram(s) Oral daily  enoxaparin Injectable 40 milliGRAM(s) SubCutaneous every 24 hours  HYDROmorphone   Tablet 4 milliGRAM(s) Oral every 6 hours  lidocaine   4% Patch 1 Patch Transdermal daily  lidocaine   4% Patch 1 Patch Transdermal daily  melatonin 3 milliGRAM(s) Oral at bedtime  multivitamin 1 Tablet(s) Oral daily  naloxegol 12.5 milliGRAM(s) Oral daily  pantoprazole    Tablet 40 milliGRAM(s) Oral before breakfast  polyethylene glycol 3350 17 Gram(s) Oral daily  senna 2 Tablet(s) Oral at bedtime  tiZANidine 4 milliGRAM(s) Oral three times a day    MEDICATIONS  (PRN):  benzocaine/menthol Lozenge 1 Lozenge Oral every 2 hours PRN Sore Throat  bisacodyl Suppository 10 milliGRAM(s) Rectal daily PRN Constipation  oxyCODONE    IR 10 milliGRAM(s) Oral every 4 hours PRN Severe Pain (7 - 10)  traMADol 50 milliGRAM(s) Oral every 6 hours PRN Mild Pain (1 - 3)    ___________________________________________________________________________    PHYSICAL EXAM:    Constitutional - NAD, Comfortable  HEENT - NCAT, EOMI  Chest - Breathing comfortably, No wheezing  Cardiovascular - S1S2   Abdomen - Soft  Extremities - No C/C/E, No calf tenderness   Neurologic Exam -                    Cognitive - AAO to self, place, date, year, situation     Communication - Fluent, No dysarthria     Cranial Nerves - CN 2-12 grossly intact     Motor -                     LEFT    UE - ShAB 5/5, EF 5/5, EE 5/5, WE 5/5,  5/5                    RIGHT UE - ShAB 3/5, EF 3/5, EE 3/5, WE 3/5,  3/5                    LEFT    LE - HF 5/5, KE 5/5, DF 5/5, PF 5/5                    RIGHT LE - HF 4/5, KE 4/5, DF 4/5, PF 4/5        Sensory - slightly decreased to light touch in all 4 extremities     Reflexes - DTR Intact, No primitive reflexive     Coordination - FTN intact on L, slowed ability on R     OculoVestibular - No nystagmus    Psychiatric - Mood stable, Affect WNL    ___________________________________________________________________________

## 2024-03-26 ENCOUNTER — TRANSCRIPTION ENCOUNTER (OUTPATIENT)
Age: 64
End: 2024-03-26

## 2024-03-26 PROCEDURE — 99232 SBSQ HOSP IP/OBS MODERATE 35: CPT

## 2024-03-26 RX ORDER — POLYETHYLENE GLYCOL 3350 17 G/17G
17 POWDER, FOR SOLUTION ORAL
Qty: 0 | Refills: 0 | DISCHARGE
Start: 2024-03-26

## 2024-03-26 RX ORDER — HYDROMORPHONE HYDROCHLORIDE 2 MG/ML
1 INJECTION INTRAMUSCULAR; INTRAVENOUS; SUBCUTANEOUS
Qty: 28 | Refills: 0
Start: 2024-03-26 | End: 2024-04-01

## 2024-03-26 RX ORDER — ATORVASTATIN CALCIUM 80 MG/1
1 TABLET, FILM COATED ORAL
Refills: 0 | DISCHARGE

## 2024-03-26 RX ORDER — TRAMADOL HYDROCHLORIDE 50 MG/1
1 TABLET ORAL
Qty: 28 | Refills: 0
Start: 2024-03-26 | End: 2024-04-01

## 2024-03-26 RX ORDER — NALOXONE HYDROCHLORIDE 4 MG/.1ML
4 SPRAY NASAL
Qty: 2 | Refills: 0
Start: 2024-03-26 | End: 2024-03-29

## 2024-03-26 RX ORDER — PREGABALIN 225 MG/1
1 CAPSULE ORAL
Qty: 30 | Refills: 0
Start: 2024-03-26 | End: 2024-04-24

## 2024-03-26 RX ORDER — LIDOCAINE 4 G/100G
1 CREAM TOPICAL
Qty: 6 | Refills: 0
Start: 2024-03-26 | End: 2024-04-24

## 2024-03-26 RX ORDER — NALOXEGOL OXALATE 12.5 MG/1
1 TABLET, FILM COATED ORAL
Qty: 30 | Refills: 0
Start: 2024-03-26 | End: 2024-04-24

## 2024-03-26 RX ORDER — LANOLIN ALCOHOL/MO/W.PET/CERES
1 CREAM (GRAM) TOPICAL
Qty: 30 | Refills: 0
Start: 2024-03-26 | End: 2024-04-24

## 2024-03-26 RX ORDER — PANTOPRAZOLE SODIUM 20 MG/1
1 TABLET, DELAYED RELEASE ORAL
Refills: 0 | DISCHARGE

## 2024-03-26 RX ORDER — ATORVASTATIN CALCIUM 80 MG/1
1 TABLET, FILM COATED ORAL
Qty: 30 | Refills: 0
Start: 2024-03-26 | End: 2024-04-24

## 2024-03-26 RX ORDER — SENNA PLUS 8.6 MG/1
2 TABLET ORAL
Qty: 0 | Refills: 0 | DISCHARGE
Start: 2024-03-26

## 2024-03-26 RX ORDER — TIZANIDINE 4 MG/1
1 TABLET ORAL
Qty: 90 | Refills: 0
Start: 2024-03-26 | End: 2024-04-24

## 2024-03-26 RX ORDER — PANTOPRAZOLE SODIUM 20 MG/1
1 TABLET, DELAYED RELEASE ORAL
Qty: 30 | Refills: 0
Start: 2024-03-26 | End: 2024-04-24

## 2024-03-26 RX ORDER — NALOXONE HYDROCHLORIDE 4 MG/.1ML
4 SPRAY NASAL
Qty: 0 | Refills: 0 | DISCHARGE

## 2024-03-26 RX ORDER — OXYCODONE HYDROCHLORIDE 5 MG/1
1 TABLET ORAL
Qty: 42 | Refills: 0
Start: 2024-03-26 | End: 2024-04-01

## 2024-03-26 RX ADMIN — HYDROMORPHONE HYDROCHLORIDE 2 MILLIGRAM(S): 2 INJECTION INTRAMUSCULAR; INTRAVENOUS; SUBCUTANEOUS at 17:12

## 2024-03-26 RX ADMIN — HYDROMORPHONE HYDROCHLORIDE 2 MILLIGRAM(S): 2 INJECTION INTRAMUSCULAR; INTRAVENOUS; SUBCUTANEOUS at 15:38

## 2024-03-26 RX ADMIN — OXYCODONE HYDROCHLORIDE 5 MILLIGRAM(S): 5 TABLET ORAL at 20:45

## 2024-03-26 RX ADMIN — PANTOPRAZOLE SODIUM 40 MILLIGRAM(S): 20 TABLET, DELAYED RELEASE ORAL at 05:47

## 2024-03-26 RX ADMIN — Medication 3 MILLIGRAM(S): at 21:41

## 2024-03-26 RX ADMIN — TIZANIDINE 4 MILLIGRAM(S): 4 TABLET ORAL at 14:21

## 2024-03-26 RX ADMIN — OXYCODONE HYDROCHLORIDE 5 MILLIGRAM(S): 5 TABLET ORAL at 20:02

## 2024-03-26 RX ADMIN — OXYCODONE HYDROCHLORIDE 5 MILLIGRAM(S): 5 TABLET ORAL at 11:55

## 2024-03-26 RX ADMIN — Medication 975 MILLIGRAM(S): at 05:46

## 2024-03-26 RX ADMIN — Medication 975 MILLIGRAM(S): at 22:40

## 2024-03-26 RX ADMIN — LIDOCAINE 1 PATCH: 4 CREAM TOPICAL at 23:00

## 2024-03-26 RX ADMIN — HYDROMORPHONE HYDROCHLORIDE 2 MILLIGRAM(S): 2 INJECTION INTRAMUSCULAR; INTRAVENOUS; SUBCUTANEOUS at 17:45

## 2024-03-26 RX ADMIN — OXYCODONE HYDROCHLORIDE 5 MILLIGRAM(S): 5 TABLET ORAL at 11:20

## 2024-03-26 RX ADMIN — ENOXAPARIN SODIUM 40 MILLIGRAM(S): 100 INJECTION SUBCUTANEOUS at 20:02

## 2024-03-26 RX ADMIN — Medication 975 MILLIGRAM(S): at 21:40

## 2024-03-26 RX ADMIN — PREGABALIN 1000 MICROGRAM(S): 225 CAPSULE ORAL at 11:19

## 2024-03-26 RX ADMIN — LIDOCAINE 1 PATCH: 4 CREAM TOPICAL at 19:43

## 2024-03-26 RX ADMIN — Medication 975 MILLIGRAM(S): at 14:21

## 2024-03-26 RX ADMIN — OXYCODONE HYDROCHLORIDE 5 MILLIGRAM(S): 5 TABLET ORAL at 03:49

## 2024-03-26 RX ADMIN — TIZANIDINE 4 MILLIGRAM(S): 4 TABLET ORAL at 21:40

## 2024-03-26 RX ADMIN — LIDOCAINE 1 PATCH: 4 CREAM TOPICAL at 11:20

## 2024-03-26 RX ADMIN — HYDROMORPHONE HYDROCHLORIDE 2 MILLIGRAM(S): 2 INJECTION INTRAMUSCULAR; INTRAVENOUS; SUBCUTANEOUS at 05:46

## 2024-03-26 RX ADMIN — LIDOCAINE 1 PATCH: 4 CREAM TOPICAL at 03:39

## 2024-03-26 RX ADMIN — HYDROMORPHONE HYDROCHLORIDE 2 MILLIGRAM(S): 2 INJECTION INTRAMUSCULAR; INTRAVENOUS; SUBCUTANEOUS at 00:03

## 2024-03-26 RX ADMIN — Medication 975 MILLIGRAM(S): at 14:55

## 2024-03-26 RX ADMIN — OXYCODONE HYDROCHLORIDE 5 MILLIGRAM(S): 5 TABLET ORAL at 04:49

## 2024-03-26 RX ADMIN — HYDROMORPHONE HYDROCHLORIDE 2 MILLIGRAM(S): 2 INJECTION INTRAMUSCULAR; INTRAVENOUS; SUBCUTANEOUS at 06:46

## 2024-03-26 RX ADMIN — Medication 975 MILLIGRAM(S): at 06:46

## 2024-03-26 RX ADMIN — NALOXEGOL OXALATE 12.5 MILLIGRAM(S): 12.5 TABLET, FILM COATED ORAL at 11:19

## 2024-03-26 RX ADMIN — TIZANIDINE 4 MILLIGRAM(S): 4 TABLET ORAL at 06:59

## 2024-03-26 RX ADMIN — Medication 1 TABLET(S): at 11:20

## 2024-03-26 RX ADMIN — ATORVASTATIN CALCIUM 10 MILLIGRAM(S): 80 TABLET, FILM COATED ORAL at 21:40

## 2024-03-26 RX ADMIN — HYDROMORPHONE HYDROCHLORIDE 2 MILLIGRAM(S): 2 INJECTION INTRAMUSCULAR; INTRAVENOUS; SUBCUTANEOUS at 12:39

## 2024-03-26 RX ADMIN — SENNA PLUS 1 TABLET(S): 8.6 TABLET ORAL at 21:41

## 2024-03-26 NOTE — DISCHARGE NOTE PROVIDER - NSDCCPCAREPLAN_GEN_ALL_CORE_FT
PRINCIPAL DISCHARGE DIAGNOSIS  Diagnosis: Central cord syndrome of cervical spinal cord  Assessment and Plan of Treatment: You had severe central canal stenosis and cord compression at C3-C4, C4-C5 and C5-C6 with C5 and C6 fractures that required surgery. You are status post C3-7 decompression w/ posterior spinal fusion by Neurosurgery and muscle flap reconstruction by Plastic Surgery on 3/8/24.   Please wear neck brace at all times. Follow up with your neurosurgeon on when to discontinue the brace.      SECONDARY DISCHARGE DIAGNOSES  Diagnosis: HTN (hypertension)  Assessment and Plan of Treatment: Your blood pressure medication enlapril was discontinued due to low blood pressure spells since starting tizanidine and dilaudid. PLease follow up with your PCP and pain management.    Diagnosis: Pain management  Assessment and Plan of Treatment: You will need to follow up with your pain management doctor to refill your medications.   We decreased your dilaudid to 2mg every 6 hours. Goal is to wean off opioids.    Take tramadol as needed for moderate    Use lidocaine patches for b/l shoulders.    Diagnosis: HLD (hyperlipidemia)  Assessment and Plan of Treatment: continue taking lipitor as prescribed.    Diagnosis: GERD (gastroesophageal reflux disease)  Assessment and Plan of Treatment: continue taking protonix as prescribed. Follow up with your PCP on when to discontinue.

## 2024-03-26 NOTE — DISCHARGE NOTE PROVIDER - HOSPITAL COURSE
HPI:  Mrs. Ai Dumont is a 63-year-old female patient with past medical history of RYGB (Anders-en-Y gastric bypass), hypertension, hyperlipidemia, depression, social EtOH use, who presented to the ED prior to admission after an episode of syncope and fall. She lost consciousness without warning nor with a preceding aura. No prior history of similar episodes. She regained consciousness some time later (unsure about duration) around 9pm but reported feeling too weak to steady herself or get up. She laid on the carpeted floor from 9PM through 7:30AM the following morning before she regained strength to take herself downstairs and call for help. She reported bilateral upper extremity pain, mostly located in the shoulders, right thumb pain, and upper neck pain. She had new onset bilateral upper extremity weakness more profound on the right side. She has since remained alert and oriented. Workup performed demonstrated C5-C6 cervical fractures with no other injuries. Patient was admitted to [facility] on 3/8/24 for surgical management of a cervical spine injury following syncopal fall. CT of the cervical spine reported acute anterior inferior endplate vertebral body fractures of C5 and C6 vertebrae. MR of cervical spine reported advanced multilevel degenerative changes contributing to high-grade multilevel central canal stenosis noting severe central canal stenosis and cord compression at C3-C4, C4-C5 and C5-C6. Following medical and cardiology clearance and optimization, patient was taken to the OR and underwent C3-C7 decompression, posterior spinal fusion with muscle flap reconstruction on 3/9/2024 with Dr. Layton. Plastic surgery consulted intraop to evaluate patient for muscle flap reconstruction of posterior spine wound given wide exposure of spine structures, need for bilateral multilevel hardware placement, use of bone graft requiring healthy vascularized muscle flap coverage of exposed vital structures and extensive foreign body. Patient tolerated procedure well. Patient was placed in hard cervical collar post op. Drain placed intraoperatively by Plastic Surgery team with outputs to be monitored q shift. Drain pulled on 3/14/24 successfully. Patient transferred to the SICU post operatively for q1 neuro checks with eventual downgrade to the floor on 3/13/24. Patient placed on PCA for acute pain control, which was discontinued on 3/10/24 with transition to PO analgesics. CT of the wrist completed due to wrist pain to evaluate for scaphoid fx: negative for acute fx. Patient was evaluated postoperatively by physical and occupational therapists for weight bearing as tolerated ambulation in cervical collar, and advised that patient would benefit from admission to rehab facility. Physical medicine and rehab team evaluated and agreed with the recommendations. Discharge and Orthopedic Care instructions were delineated in the Discharge Plan and reviewed with the patient per documentation. All medications were delineated in the medication reconciliation tool and key points were reviewed with the patient per documentation. Patient was deemed stable from an Orthopedic & medical standpoint at prior facility and was discharge on 3/16/24 with no new reported neuro deficits to be admitted at [facility].      Pt was stable upon rehab admission to  Inpatient Rehabilitation Facility. Admitted with gait instability, ADL, and functional impairments.     Rehab Course significant for pain that limited her therapy initially. Her pain was controlled with standing tizanidine 4mg TID, and Dilaudid that was uptitrated to 4g q6h with PRN oxycodone. She experienced hypotension and her Dilaudid was decreased to 2gm q6h. All other medical co-morbidities were stable. Patient to remain in c-collar at all times.   Pt tolerated course of inpatient PT/OT/SLP rehab with significant functional improvements and met rehab goals prior to discharge. Pt was medically cleared on 3/29 for discharge to home.     Pt will follow up with the following:  Pasha Layton  Orthopaedic Surgery  [address] [place] [address], [address]  [place] [address]  Phone: [phone]  Fax: [phone]  Follow Up Time: 2 weeks    f/u Pain management

## 2024-03-26 NOTE — DISCHARGE NOTE PROVIDER - NSDCMRMEDTOKEN_GEN_ALL_CORE_FT
atorvastatin 10 mg oral tablet: 1 tab(s) orally once a day (at bedtime)  bisacodyl 10 mg rectal suppository: 1 suppository(ies) rectal once a day as needed for Constipation  cyanocobalamin 1000 mcg oral tablet: 1 tab(s) orally once a day  HYDROmorphone 2 mg oral tablet: 1 tab(s) orally every 6 hours MDD: 8mg  lidocaine 4% topical film: Apply topically to affected area once a day 12 hours on and 12 hours off  melatonin 3 mg oral tablet: 1 tab(s) orally once a day (at bedtime)  Multiple Vitamins oral tablet: 1 tab(s) orally once a day  naloxegol 12.5 mg oral tablet: 1 tab(s) orally once a day  Narcan 4 mg/0.1 mL nasal spray: 4 milligram(s) intranasally every 2 to 3 minutes as needed for Opioid overdose alternating nostrils as needed for overdose  oxyCODONE 5 mg oral tablet: 1 tab(s) orally every 4 hours as needed for Severe Pain (7 - 10) MDD: 30mg  pantoprazole 40 mg oral delayed release tablet: 1 tab(s) orally once a day (before a meal)  polyethylene glycol 3350 oral powder for reconstitution: 17 gram(s) orally once a day  senna leaf extract oral tablet: 2 tab(s) orally once a day (at bedtime)  tiZANidine 4 mg oral tablet: 1 tab(s) orally 3 times a day  traMADol 50 mg oral tablet: 1 tab(s) orally every 6 hours as needed for Moderate Pain (4 - 6) MDD: 200mg

## 2024-03-26 NOTE — PROGRESS NOTE ADULT - ASSESSMENT
Mrs. Ai Dumont is a 63-year-old female patient with past medical history of RYGB (Anders-en-Y gastric bypass), hypertension, hyperlipidemia, depression, and social EtOH use who is admitted for Acute Inpatient Rehabilitation with a multidisciplinary rehab program at [facility] with functional impairments in ADLs and mobility secondary to a traumatic cervical spinal cord injury after a syncope and fall with loss of consciousness.    Traumatic cervical spinal cord injury  - Central cord syndrome  - Severe central canal stenosis and cord compression at C3-C4, C4-C5 and C5-C6 with C5 and C6 fractures  - S/P C3-7 decompression w/ posterior spinal fusion by Neurosurgery and muscle flap reconstruction by Plastic Surgery on 3/8/24       * Patient to remain in C collar at all times        * Keep surgical dressing clean and dry, have dressing/sutures removed and steri-strips applied post operative day #13 (3/22/24)  - Traumatic brain injury with prolonged loss of consciousness  - Generalized weakness - UEs > LEs  - Impaired ADLs and mobility  - Need for assistance with personal care  - Continue Comprehensive Multidisciplinary Rehab Program:       * PT to focus on BUE paresis in setting of cord compression working on strengthening of extremities as well as transfers and gait training       * OT to focus on functional deficits in setting of BUE paresis  - Pain regimen as below    HTN  - enalapril 5mg daily  - Monitor BP    HLD  - atorvastatin 10mg daily    Pain  - Tylenol 975q8h scheduled,  - hydromorphone 4mg decreased to 2mg q6 for with holding parameters for hypotension.    - tramadol prn moderate, and oxycodone 5mg prn.   - tizanidine standing 3/20.   - lidocaine patches bilateral shoulders    Sleep  - Maintain quiet hours and a low stim environment.   - Melatonin PRN     GI / Bowel  s/p Anders en Y bypass  - Senna qHS  - Miralax Daily  - GI ppx: protonix  - naloxegol 12.5mg daily     / Bladder  - Currently patient voids independently   - bladder scan once on admission with straight cath for >400cc.    Skin / Pressure injury assessment  - Skin assessment on admission performed   - Monitor Incisions:  posterior cervical incision site  -- removed dressing and staples 3/22.   - Turn q2 hours in bed while awake, air mattress  - nursing to monitor skin qShift  - soft heel protectors  - skin barrier cream PRN    Diet:  - Diet Consistency: DASH  - Supplements: b12 and MVI  - Aspiration Precautions  - Nutrition consult    DVT prophylaxis:   - Lovenox    Outpatient Follow-up:  Pasha Layton  Orthopaedic Surgery  [address], [address]  [place] [address]  Phone: [phone]  Fax: [phone]  Follow Up Time: 2 weeks    f/u Pain management    Code Status/Emergency Contact:  Full Code     Steven - spouse - [phone]  PCP is Dr. Beckman  Patient uses [facility] in [place] as her preferred pharmacy.   ---------------   Mrs. Ai Dumont is a 63-year-old female patient with past medical history of RYGB (Anders-en-Y gastric bypass), hypertension, hyperlipidemia, depression, and social EtOH use who is admitted for Acute Inpatient Rehabilitation with a multidisciplinary rehab program at [facility] with functional impairments in ADLs and mobility secondary to a traumatic cervical spinal cord injury after a syncope and fall with loss of consciousness.    Traumatic cervical spinal cord injury  - Central cord syndrome  - Severe central canal stenosis and cord compression at C3-C4, C4-C5 and C5-C6 with C5 and C6 fractures  - S/P C3-7 decompression w/ posterior spinal fusion by Neurosurgery and muscle flap reconstruction by Plastic Surgery on 3/8/24       * Patient to remain in C collar at all times        * Keep surgical dressing clean and dry, have dressing/sutures removed and steri-strips applied post operative day #13 (3/22/24)  - Traumatic brain injury with prolonged loss of consciousness  - Generalized weakness - UEs > LEs  - Impaired ADLs and mobility  - Need for assistance with personal care  - Continue Comprehensive Multidisciplinary Rehab Program:       * PT to focus on BUE paresis in setting of cord compression working on strengthening of extremities as well as transfers and gait training       * OT to focus on functional deficits in setting of BUE paresis  - Pain regimen as below    HTN  - enalapril 5mg daily  - Monitor BP    HLD  - atorvastatin 10mg daily    Pain  - Tylenol 975q8h scheduled,  - Hydromorphone 4mg decreased to 2mg q6 for with holding parameters for hypotension.    - tramadol prn moderate, and oxycodone 5mg prn.   - tizanidine standing 3/20.   - lidocaine patches bilateral shoulders    Sleep  - Maintain quiet hours and a low stim environment.   - Melatonin PRN     GI / Bowel  s/p Anders en Y bypass  - Senna qHS  - Miralax Daily  - GI ppx: protonix  - naloxegol 12.5mg daily     / Bladder  - Currently patient voids independently   - bladder scan once on admission with straight cath for >400cc.    Skin / Pressure injury assessment  - Skin assessment on admission performed   - Monitor Incisions:  posterior cervical incision site  -- removed dressing and staples 3/22.   - Turn q2 hours in bed while awake, air mattress  - nursing to monitor skin qShift  - soft heel protectors  - skin barrier cream PRN    Diet:  - Diet Consistency: DASH  - Supplements: b12 and MVI  - Aspiration Precautions  - Nutrition consult    DVT prophylaxis:   - Lovenox    Outpatient Follow-up:  Pasha Layton  Orthopaedic Surgery  [address], [address]  [place] [address]  Phone: [phone]  Fax: [phone]  Follow Up Time: 2 weeks    f/u Pain management    Code Status/Emergency Contact:  Full Code     Steven - spouse - [phone]  PCP is Dr. Beckman  Patient uses [facility] in [place] as her preferred pharmacy.   ---------------

## 2024-03-26 NOTE — DISCHARGE NOTE PROVIDER - CARE PROVIDER_API CALL
Pasha Layton  Orthopaedic Surgery  [address], [address]  [place] [address]  Phone: [phone]  Fax: [phone]  Follow Up Time: 1 week    Pain management,   Phone: (   )    -  Fax: (   )    -  Follow Up Time:     Dr. Beckman,   Your PCP  Phone: (   )    -  Fax: (   )    -  Follow Up Time:

## 2024-03-26 NOTE — PROGRESS NOTE ADULT - SUBJECTIVE AND OBJECTIVE BOX
ORTHOPEDIC SPINE PROGRESS NOTE:    Patient resting c/o b/l shoulder pain.    Patient denies chest pain, SOB, N/V.  Last BM 2 days ago.   +Flatus.    Vital Signs Last 24 Hrs  T(C): 36.8 (15 Mar 2024 05:26), Max: 36.9 (14 Mar 2024 20:26)  T(F): 98.2 (15 Mar 2024 05:26), Max: 98.4 (14 Mar 2024 20:26)  HR: 68 (15 Mar 2024 05:26) (65 - 74)  BP: 120/82 (15 Mar 2024 05:26) (107/67 - 125/84)  BP(mean): --  RR: 18 (15 Mar 2024 05:26) (18 - 18)  SpO2: 97% (15 Mar 2024 05:26) (96% - 98%)    Parameters below as of 15 Mar 2024 05:26  Patient On (Oxygen Delivery Method): room air      Exam:  Gen: Alert and Oriented, No Acute Distress, Sitting in recliner at bedside  HEENT: Hard C-collar in place, post neck Aquacel with mild drainage localized to central area of the dressing, otherwise clean dry and intact.   Ext: Dull sensation grossly intact  Motor:          Upper extremity                       C5 (Shoulder Abd)    C6 (Elbow flex)   C7 (Elbow ext)            C8                     T1                                                R                3/5                       3/5                      3/5                    4/5                   4/5                                                L                4/5                        4/5                      4/5                    5/5                   5/5                   Lower extremity         L2 (Hip flex)  L3 (knee ext)  L4 (Dorsi flex)  L5 (EHL)  S1 (Plantar flex)                                                                                           R        5/5            5/5             5/5                    5/5          5/5                                                L         5/5            5/5             5/5                   5/5           5/5                        musculoskeletal

## 2024-03-26 NOTE — DISCHARGE NOTE PROVIDER - CARE PROVIDERS DIRECT ADDRESSES
[email or web address],DirectAddress_Unknown,DirectAddress_Unknown

## 2024-03-26 NOTE — DISCHARGE NOTE PROVIDER - NSDCFUSCHEDAPPT_GEN_ALL_CORE_FT
Pasha Layton  [facility]  ORTHOSURG [address]  Scheduled Appointment: 04/01/2024

## 2024-03-26 NOTE — CHART NOTE - NSCHARTNOTEFT_GEN_A_CORE
Nutrition Follow Up Note  Hospital Course   (Per Electronic Medical Record)    Source:  Patient [X]  Medical Record [X]      Diet:   Regular Diet (IDDSI Level 7) w/ Thin Liquids (IDDSI Level 0)  Tolerates Diet Consistency Well  No Chewing/Swallowing Difficulties  No Recent Nausea, Vomiting, Diarrhea or Constipation (as Per Patient)   Consumes % of Meals (as Per Documentation) - States Good PO Intake/Appetite (Per Patient)   on Ensure Max 11oz PO Daily (Provides 150kcal & 30grams of Protein)  Declines Nutrition Supplementation - Recommend Discontinue (Per Patient Request)   Education Provided on Proper Nutrition  Obtained Food Preferences from Patient    Enteral/Parenteral Nutrition: Not Applicable    Current Weight: 154.9lb on 3/25  Obtain Weights Weekly  Weights Currently Stable @This Time     Pertinent Medications: MEDICATIONS  (STANDING):  acetaminophen     Tablet .. 975 milliGRAM(s) Oral every 8 hours  atorvastatin 10 milliGRAM(s) Oral at bedtime  cyanocobalamin 1000 MICROGram(s) Oral daily  enoxaparin Injectable 40 milliGRAM(s) SubCutaneous every 24 hours  HYDROmorphone   Tablet 2 milliGRAM(s) Oral every 6 hours  lidocaine   4% Patch 1 Patch Transdermal daily  lidocaine   4% Patch 1 Patch Transdermal daily  melatonin 3 milliGRAM(s) Oral at bedtime  multivitamin 1 Tablet(s) Oral daily  naloxegol 12.5 milliGRAM(s) Oral daily  pantoprazole    Tablet 40 milliGRAM(s) Oral before breakfast  polyethylene glycol 3350 17 Gram(s) Oral daily  senna 2 Tablet(s) Oral at bedtime  tiZANidine 4 milliGRAM(s) Oral three times a day    MEDICATIONS  (PRN):  benzocaine/menthol Lozenge 1 Lozenge Oral every 2 hours PRN Sore Throat  bisacodyl Suppository 10 milliGRAM(s) Rectal daily PRN Constipation  oxyCODONE    IR 5 milliGRAM(s) Oral every 4 hours PRN Severe Pain (7 - 10)  traMADol 50 milliGRAM(s) Oral every 6 hours PRN Moderate Pain (4 - 6)    Pertinent Labs:  03-25 Na143 mmol/L Glu 92 mg/dL K+ 3.9 mmol/L Cr  0.78 mg/dL BUN 17 mg/dL 03-25 Alb 3.2 g/dL<L>    Skin: No Pressure Ulcers   Surgical Incision on Neck  (as Per Nursing Flow Sheet)     Edema: None Noted (as Per Documentation)     Last Bowel Movement: on 3/23    Estimated Needs:   [X] No Change Since Previous Assessment    Previous Nutrition Diagnosis:   Moderate Malnutrition    Nutrition Diagnosis is [X] Ongoing - Education Provided on Proper Nutrition - Declines Nutrition Supplementation - Recommend Discontinue (Per Patient Request)      New Nutrition Diagnosis: [X] Not Applicable    Interventions:   1. Recommend Discontinue Ensure Max 11oz PO Daily  2. Education Provided on Proper Nutrition   3. Recommend Continue Nutrition Plan of Care     Monitoring & Evaluation:   [X] Weights   [X] PO Intake   [X] Skin Integrity   [X] Follow Up (Per Protocol)  [X] Tolerance to Diet Prescription   [X] Other: Labs     Registered Dietitian/Nutritionist Remains Available.  Troy Chisholm, AMORN, CDN    Phone# [phone]

## 2024-03-26 NOTE — DISCHARGE NOTE PROVIDER - PROVIDER TOKENS
PROVIDER:[TOKEN:[[id number]],FOLLOWUP:[1 week]],FREE:[LAST:[Pain management],PHONE:[(   )    -],FAX:[(   )    -]],FREE:[LAST:[Dr. Beckman],PHONE:[(   )    -],FAX:[(   )    -],ADDRESS:[Your PCP]]

## 2024-03-26 NOTE — PROGRESS NOTE ADULT - SUBJECTIVE AND OBJECTIVE BOX
HPI:  Mrs. Ai Dumont is a 63-year-old female patient with past medical history of RYGB (Anders-en-Y gastric bypass), hypertension, hyperlipidemia, depression, social EtOH use, who presented to the ED prior to admission after an episode of syncope and fall. She lost consciousness without warning nor with a preceding aura. No prior history of similar episodes. She regained consciousness some time later (unsure about duration) around 9pm but reported feeling too weak to steady herself or get up. She laid on the carpeted floor from 9PM through 7:30AM the following morning before she regained strength to take herself downstairs and call for help. She reported bilateral upper extremity pain, mostly located in the shoulders, right thumb pain, and upper neck pain. She had new onset bilateral upper extremity weakness more profound on the right side. She has since remained alert and oriented. Workup performed demonstrated C5-C6 cervical fractures with no other injuries. Patient was admitted to [facility] on 3/8/24 for surgical management of a cervical spine injury following syncopal fall. CT of the cervical spine reported acute anterior inferior endplate vertebral body fractures of C5 and C6 vertebrae. MR of cervical spine reported advanced multilevel degenerative changes contributing to high-grade multilevel central canal stenosis noting severe central canal stenosis and cord compression at C3-C4, C4-C5 and C5-C6. Following medical and cardiology clearance and optimization, patient was taken to the OR and underwent C3-C7 decompression, posterior spinal fusion with muscle flap reconstruction on 3/9/2024 with Dr. Layton. Plastic surgery consulted intraop to evaluate patient for muscle flap reconstruction of posterior spine wound given wide exposure of spine structures, need for bilateral multilevel hardware placement, use of bone graft requiring healthy vascularized muscle flap coverage of exposed vital structures and extensive foreign body. Patient tolerated procedure well. Patient was placed in hard cervical collar post op. Drain placed intraoperatively by Plastic Surgery team with outputs to be monitored q shift. Drain pulled on 3/14/24 successfully. Patient transferred to the SICU post operatively for q1 neuro checks with eventual downgrade to the floor on 3/13/24. Patient placed on PCA for acute pain control, which was discontinued on 3/10/24 with transition to PO analgesics. CT of the wrist completed due to wrist pain to evaluate for scaphoid fx: negative for acute fx. Patient was evaluated postoperatively by physical and occupational therapists for weight bearing as tolerated ambulation in cervical collar, and advised that patient would benefit from admission to rehab facility. Physical medicine and rehab team evaluated and agreed with the recommendations. Discharge and Orthopedic Care instructions were delineated in the Discharge Plan and reviewed with the patient per documentation. All medications were delineated in the medication reconciliation tool and key points were reviewed with the patient per documentation. Patient was deemed stable from an Orthopedic & medical standpoint at prior facility and was discharge on 3/16/24 with no new reported neuro deficits to be admitted at [facility].  (16 Mar 2024 12:47)    TDD 3/29 home  ___________________________________________________________________________    SUBJECTIVE/ROS  Patient was seen and evaluated at bedside and at therapy today.  Reported no overnight events. Her pain has much improved and she is able to ambulate/particiapte in therapy since the restart of her dilaudid   [id number] medications sent.   Case to be reevaluated at Interdisciplinary Team meeting today.  Patient is motivated to participate on the recommended rehabilitation program.  Denies any CP, SOB, PULLIAM, palpitations, fever, chills, body aches, cough, congestion, or any other symptoms at this time.   ___________________________________________________________________________    Vital Signs Last 24 Hrs  T(C): 36.5 (26 Mar 2024 07:51), Max: 36.5 (26 Mar 2024 07:51)  T(F): 97.7 (26 Mar 2024 07:51), Max: 97.7 (26 Mar 2024 07:51)  HR: 68 (26 Mar 2024 07:51) (67 - 75)  BP: 99/65 (26 Mar 2024 07:51) (99/65 - 136/80)  BP(mean): --  RR: 15 (26 Mar 2024 07:51) (15 - 16)  SpO2: 98% (26 Mar 2024 07:51) (98% - 98%)    Parameters below as of 26 Mar 2024 07:51  Patient On (Oxygen Delivery Method): room air        ___________________________________________________________________________    LAB                          12.4   5.90  )-----------( 384      ( 25 Mar 2024 07:05 )             39.4     03-25    143  |  105  |  17  ----------------------------<  92  3.9   |  28  |  0.78    Ca    9.3      25 Mar 2024 07:05    TPro  6.9  /  Alb  3.2<L>  /  TBili  0.3  /  DBili  x   /  AST  46<H>  /  ALT  47<H>  /  AlkPhos  71  03-25    ___________________________________________________________________________  MEDICATIONS  (STANDING):  acetaminophen     Tablet .. 975 milliGRAM(s) Oral every 8 hours  atorvastatin 10 milliGRAM(s) Oral at bedtime  cyanocobalamin 1000 MICROGram(s) Oral daily  enoxaparin Injectable 40 milliGRAM(s) SubCutaneous every 24 hours  HYDROmorphone   Tablet 2 milliGRAM(s) Oral every 6 hours  lidocaine   4% Patch 1 Patch Transdermal daily  lidocaine   4% Patch 1 Patch Transdermal daily  melatonin 3 milliGRAM(s) Oral at bedtime  multivitamin 1 Tablet(s) Oral daily  naloxegol 12.5 milliGRAM(s) Oral daily  pantoprazole    Tablet 40 milliGRAM(s) Oral before breakfast  polyethylene glycol 3350 17 Gram(s) Oral daily  senna 2 Tablet(s) Oral at bedtime  tiZANidine 4 milliGRAM(s) Oral three times a day    MEDICATIONS  (PRN):  benzocaine/menthol Lozenge 1 Lozenge Oral every 2 hours PRN Sore Throat  bisacodyl Suppository 10 milliGRAM(s) Rectal daily PRN Constipation  oxyCODONE    IR 5 milliGRAM(s) Oral every 4 hours PRN Severe Pain (7 - 10)  traMADol 50 milliGRAM(s) Oral every 6 hours PRN Moderate Pain (4 - 6)    ___________________________________________________________________________    PHYSICAL EXAM:    Constitutional - NAD, Comfortable  HEENT - NCAT, EOMI  Chest - Breathing comfortably, No wheezing  Cardiovascular - S1S2   Abdomen - Soft  Extremities - No C/C/E, No calf tenderness   Neurologic Exam -                    Cognitive - AAO to self, place, date, year, situation     Communication - Fluent, No dysarthria     Cranial Nerves - CN 2-12 grossly intact     Motor -                     LEFT    UE - ShAB 5/5, EF 5/5, EE 5/5, WE 5/5,  5/5                    RIGHT UE - ShAB 3/5, EF 3/5, EE 3/5, WE 3/5,  3/5                    LEFT    LE - HF 5/5, KE 5/5, DF 5/5, PF 5/5                    RIGHT LE - HF 4/5, KE 4/5, DF 4/5, PF 4/5        Sensory - slightly decreased to light touch in all 4 extremities     Reflexes - DTR Intact, No primitive reflexive     Coordination - FTN intact on L, slowed ability on R     OculoVestibular - No nystagmus    Psychiatric - Mood stable, Affect WNL    ___________________________________________________________________________ HPI:  Mrs. Ai Dumont is a 63-year-old female patient with past medical history of RYGB (Anders-en-Y gastric bypass), hypertension, hyperlipidemia, depression, social EtOH use, who presented to the ED prior to admission after an episode of syncope and fall. She lost consciousness without warning nor with a preceding aura. No prior history of similar episodes. She regained consciousness some time later (unsure about duration) around 9pm but reported feeling too weak to steady herself or get up. She laid on the carpeted floor from 9PM through 7:30AM the following morning before she regained strength to take herself downstairs and call for help. She reported bilateral upper extremity pain, mostly located in the shoulders, right thumb pain, and upper neck pain. She had new onset bilateral upper extremity weakness more profound on the right side. She has since remained alert and oriented. Workup performed demonstrated C5-C6 cervical fractures with no other injuries. Patient was admitted to [facility] on 3/8/24 for surgical management of a cervical spine injury following syncopal fall. CT of the cervical spine reported acute anterior inferior endplate vertebral body fractures of C5 and C6 vertebrae. MR of cervical spine reported advanced multilevel degenerative changes contributing to high-grade multilevel central canal stenosis noting severe central canal stenosis and cord compression at C3-C4, C4-C5 and C5-C6. Following medical and cardiology clearance and optimization, patient was taken to the OR and underwent C3-C7 decompression, posterior spinal fusion with muscle flap reconstruction on 3/9/2024 with Dr. Layton. Plastic surgery consulted intraop to evaluate patient for muscle flap reconstruction of posterior spine wound given wide exposure of spine structures, need for bilateral multilevel hardware placement, use of bone graft requiring healthy vascularized muscle flap coverage of exposed vital structures and extensive foreign body. Patient tolerated procedure well. Patient was placed in hard cervical collar post op. Drain placed intraoperatively by Plastic Surgery team with outputs to be monitored q shift. Drain pulled on 3/14/24 successfully. Patient transferred to the SICU post operatively for q1 neuro checks with eventual downgrade to the floor on 3/13/24. Patient placed on PCA for acute pain control, which was discontinued on 3/10/24 with transition to PO analgesics. CT of the wrist completed due to wrist pain to evaluate for scaphoid fx: negative for acute fx. Patient was evaluated postoperatively by physical and occupational therapists for weight bearing as tolerated ambulation in cervical collar, and advised that patient would benefit from admission to rehab facility. Physical medicine and rehab team evaluated and agreed with the recommendations. Discharge and Orthopedic Care instructions were delineated in the Discharge Plan and reviewed with the patient per documentation. All medications were delineated in the medication reconciliation tool and key points were reviewed with the patient per documentation. Patient was deemed stable from an Orthopedic & medical standpoint at prior facility and was discharge on 3/16/24 with no new reported neuro deficits to be admitted at [facility].  (16 Mar 2024 12:47)    TDD 3/29 home  ___________________________________________________________________________    SUBJECTIVE/ROS  Patient was seen and evaluated at bedside and at therapy today.  Reported no overnight events. Her pain has much improved and she is able to ambulate/particiapte in therapy since the restart of her Dilaudid   [id number] medications sent.   Case was discussed at Interdisciplinary Team meeting today.  No changes to the tentative discharge date outlined above.  Patient remains motivated to participate on the recommended rehabilitation program.  Denies any CP, SOB, PULLIAM, palpitations, fever, chills, body aches, cough, congestion, or any other symptoms at this time.   ___________________________________________________________________________    Vital Signs Last 24 Hrs  T(C): 36.5 (26 Mar 2024 07:51), Max: 36.5 (26 Mar 2024 07:51)  T(F): 97.7 (26 Mar 2024 07:51), Max: 97.7 (26 Mar 2024 07:51)  HR: 68 (26 Mar 2024 07:51) (67 - 75)  BP: 99/65 (26 Mar 2024 07:51) (99/65 - 136/80)  RR: 15 (26 Mar 2024 07:51) (15 - 16)  SpO2: 98% (26 Mar 2024 07:51) (98% - 98%)    ___________________________________________________________________________    LAB                          12.4   5.90  )-----------( 384      ( 25 Mar 2024 07:05 )             39.4     03-25    143  |  105  |  17  ----------------------------<  92  3.9   |  28  |  0.78    Ca    9.3      25 Mar 2024 07:05    TPro  6.9  /  Alb  3.2<L>  /  TBili  0.3  /  DBili  x   /  AST  46<H>  /  ALT  47<H>  /  AlkPhos  71  03-25    ___________________________________________________________________________    MEDICATIONS  (STANDING):  acetaminophen     Tablet .. 975 milliGRAM(s) Oral every 8 hours  atorvastatin 10 milliGRAM(s) Oral at bedtime  cyanocobalamin 1000 MICROGram(s) Oral daily  enoxaparin Injectable 40 milliGRAM(s) SubCutaneous every 24 hours  HYDROmorphone   Tablet 2 milliGRAM(s) Oral every 6 hours  lidocaine   4% Patch 1 Patch Transdermal daily  lidocaine   4% Patch 1 Patch Transdermal daily  melatonin 3 milliGRAM(s) Oral at bedtime  multivitamin 1 Tablet(s) Oral daily  naloxegol 12.5 milliGRAM(s) Oral daily  pantoprazole    Tablet 40 milliGRAM(s) Oral before breakfast  polyethylene glycol 3350 17 Gram(s) Oral daily  senna 2 Tablet(s) Oral at bedtime  tiZANidine 4 milliGRAM(s) Oral three times a day    MEDICATIONS  (PRN):  benzocaine/menthol Lozenge 1 Lozenge Oral every 2 hours PRN Sore Throat  bisacodyl Suppository 10 milliGRAM(s) Rectal daily PRN Constipation  oxyCODONE    IR 5 milliGRAM(s) Oral every 4 hours PRN Severe Pain (7 - 10)  traMADol 50 milliGRAM(s) Oral every 6 hours PRN Moderate Pain (4 - 6)    ___________________________________________________________________________    PHYSICAL EXAM:    Constitutional - NAD, Comfortable  HEENT - NCAT, EOMI  Chest - Breathing comfortably, No wheezing  Cardiovascular - S1S2   Abdomen - Soft  Extremities - No C/C/E, No calf tenderness   Neurologic Exam -                    Cognitive - AAO to self, place, date, year, situation     Communication - Fluent, No dysarthria     Cranial Nerves - CN 2-12 grossly intact     Motor -                     LEFT    UE - ShAB 5/5, EF 5/5, EE 5/5, WE 5/5,  5/5                    RIGHT UE - ShAB 3/5, EF 3/5, EE 3/5, WE 3/5,  3/5                    LEFT    LE - HF 5/5, KE 5/5, DF 5/5, PF 5/5                    RIGHT LE - HF 4/5, KE 4/5, DF 4/5, PF 4/5        Sensory - slightly decreased to light touch in all 4 extremities     Reflexes - DTR Intact, No primitive reflexive     Coordination - FTN intact on L, slowed ability on R     OculoVestibular - No nystagmus    Psychiatric - Mood stable, Affect WNL    ___________________________________________________________________________

## 2024-03-27 PROCEDURE — 99232 SBSQ HOSP IP/OBS MODERATE 35: CPT

## 2024-03-27 RX ADMIN — Medication 975 MILLIGRAM(S): at 05:54

## 2024-03-27 RX ADMIN — TIZANIDINE 4 MILLIGRAM(S): 4 TABLET ORAL at 21:14

## 2024-03-27 RX ADMIN — ENOXAPARIN SODIUM 40 MILLIGRAM(S): 100 INJECTION SUBCUTANEOUS at 20:05

## 2024-03-27 RX ADMIN — Medication 975 MILLIGRAM(S): at 14:03

## 2024-03-27 RX ADMIN — PANTOPRAZOLE SODIUM 40 MILLIGRAM(S): 20 TABLET, DELAYED RELEASE ORAL at 06:00

## 2024-03-27 RX ADMIN — LIDOCAINE 1 PATCH: 4 CREAM TOPICAL at 11:11

## 2024-03-27 RX ADMIN — HYDROMORPHONE HYDROCHLORIDE 2 MILLIGRAM(S): 2 INJECTION INTRAMUSCULAR; INTRAVENOUS; SUBCUTANEOUS at 00:14

## 2024-03-27 RX ADMIN — OXYCODONE HYDROCHLORIDE 5 MILLIGRAM(S): 5 TABLET ORAL at 20:30

## 2024-03-27 RX ADMIN — Medication 975 MILLIGRAM(S): at 21:14

## 2024-03-27 RX ADMIN — HYDROMORPHONE HYDROCHLORIDE 2 MILLIGRAM(S): 2 INJECTION INTRAMUSCULAR; INTRAVENOUS; SUBCUTANEOUS at 11:10

## 2024-03-27 RX ADMIN — Medication 3 MILLIGRAM(S): at 21:14

## 2024-03-27 RX ADMIN — Medication 975 MILLIGRAM(S): at 06:33

## 2024-03-27 RX ADMIN — TIZANIDINE 4 MILLIGRAM(S): 4 TABLET ORAL at 14:03

## 2024-03-27 RX ADMIN — Medication 975 MILLIGRAM(S): at 22:14

## 2024-03-27 RX ADMIN — HYDROMORPHONE HYDROCHLORIDE 2 MILLIGRAM(S): 2 INJECTION INTRAMUSCULAR; INTRAVENOUS; SUBCUTANEOUS at 01:00

## 2024-03-27 RX ADMIN — HYDROMORPHONE HYDROCHLORIDE 2 MILLIGRAM(S): 2 INJECTION INTRAMUSCULAR; INTRAVENOUS; SUBCUTANEOUS at 06:33

## 2024-03-27 RX ADMIN — HYDROMORPHONE HYDROCHLORIDE 2 MILLIGRAM(S): 2 INJECTION INTRAMUSCULAR; INTRAVENOUS; SUBCUTANEOUS at 12:00

## 2024-03-27 RX ADMIN — LIDOCAINE 1 PATCH: 4 CREAM TOPICAL at 19:38

## 2024-03-27 RX ADMIN — TIZANIDINE 4 MILLIGRAM(S): 4 TABLET ORAL at 05:51

## 2024-03-27 RX ADMIN — Medication 1 TABLET(S): at 11:10

## 2024-03-27 RX ADMIN — PREGABALIN 1000 MICROGRAM(S): 225 CAPSULE ORAL at 11:10

## 2024-03-27 RX ADMIN — ATORVASTATIN CALCIUM 10 MILLIGRAM(S): 80 TABLET, FILM COATED ORAL at 21:15

## 2024-03-27 RX ADMIN — HYDROMORPHONE HYDROCHLORIDE 2 MILLIGRAM(S): 2 INJECTION INTRAMUSCULAR; INTRAVENOUS; SUBCUTANEOUS at 17:11

## 2024-03-27 RX ADMIN — OXYCODONE HYDROCHLORIDE 5 MILLIGRAM(S): 5 TABLET ORAL at 05:00

## 2024-03-27 RX ADMIN — OXYCODONE HYDROCHLORIDE 5 MILLIGRAM(S): 5 TABLET ORAL at 04:26

## 2024-03-27 RX ADMIN — OXYCODONE HYDROCHLORIDE 5 MILLIGRAM(S): 5 TABLET ORAL at 20:12

## 2024-03-27 RX ADMIN — HYDROMORPHONE HYDROCHLORIDE 2 MILLIGRAM(S): 2 INJECTION INTRAMUSCULAR; INTRAVENOUS; SUBCUTANEOUS at 05:51

## 2024-03-27 RX ADMIN — HYDROMORPHONE HYDROCHLORIDE 2 MILLIGRAM(S): 2 INJECTION INTRAMUSCULAR; INTRAVENOUS; SUBCUTANEOUS at 23:57

## 2024-03-27 NOTE — PROGRESS NOTE ADULT - SUBJECTIVE AND OBJECTIVE BOX
Patient is a 63y old  Female who presents with a chief complaint of Traumatic cervical spinal cord injury (26 Mar 2024 11:07)      SUBJECTIVE / OVERNIGHT EVENTS:  Pt seen and examined at bedside. No acute events overnight.  Pt denies cp, palpitations, sob, abd pain, N/V, fever, chills.    ROS:  All other review of systems negative    Allergies    No Known Allergies    Intolerances        MEDICATIONS  (STANDING):  acetaminophen     Tablet .. 975 milliGRAM(s) Oral every 8 hours  atorvastatin 10 milliGRAM(s) Oral at bedtime  cyanocobalamin 1000 MICROGram(s) Oral daily  enoxaparin Injectable 40 milliGRAM(s) SubCutaneous every 24 hours  HYDROmorphone   Tablet 2 milliGRAM(s) Oral every 6 hours  lidocaine   4% Patch 1 Patch Transdermal daily  lidocaine   4% Patch 1 Patch Transdermal daily  melatonin 3 milliGRAM(s) Oral at bedtime  multivitamin 1 Tablet(s) Oral daily  naloxegol 12.5 milliGRAM(s) Oral daily  pantoprazole    Tablet 40 milliGRAM(s) Oral before breakfast  polyethylene glycol 3350 17 Gram(s) Oral daily  senna 2 Tablet(s) Oral at bedtime  tiZANidine 4 milliGRAM(s) Oral three times a day    MEDICATIONS  (PRN):  benzocaine/menthol Lozenge 1 Lozenge Oral every 2 hours PRN Sore Throat  bisacodyl Suppository 10 milliGRAM(s) Rectal daily PRN Constipation  oxyCODONE    IR 5 milliGRAM(s) Oral every 4 hours PRN Severe Pain (7 - 10)  traMADol 50 milliGRAM(s) Oral every 6 hours PRN Moderate Pain (4 - 6)      Vital Signs Last 24 Hrs  T(C): 36.3 (26 Mar 2024 19:58), Max: 36.3 (26 Mar 2024 19:58)  T(F): 97.4 (26 Mar 2024 19:58), Max: 97.4 (26 Mar 2024 19:58)  HR: 64 (27 Mar 2024 05:48) (64 - 67)  BP: 135/77 (27 Mar 2024 05:48) (103/68 - 135/77)  BP(mean): --  RR: 15 (26 Mar 2024 19:58) (15 - 15)  SpO2: 98% (26 Mar 2024 19:58) (98% - 98%)    Parameters below as of 26 Mar 2024 19:58  Patient On (Oxygen Delivery Method): room air      CAPILLARY BLOOD GLUCOSE        I&O's Summary      PHYSICAL EXAM:  GENERAL: NAD, well-developed AAOx3 female   HEAD:  Atraumatic, Normocephalic  NECK: + C-collar  CHEST/LUNG: Clear to auscultation bilaterally; No wheeze, nonlabored breathing  HEART: Regular rate and rhythm; No murmurs, rubs, or gallops  ABDOMEN: Soft, Nontender, Nondistended; Bowel sounds present  EXTREMITIES:  2+ Peripheral Pulses, No clubbing, cyanosis, or edema    LABS:                    RADIOLOGY & ADDITIONAL TESTS:  Results Reviewed:   Imaging Personally Reviewed:  Electrocardiogram Personally Reviewed:    COORDINATION OF CARE:  Care Discussed with Consultants/Other Providers [Y/N]:  Prior or Outpatient Records Reviewed [Y/N]:

## 2024-03-27 NOTE — PROGRESS NOTE ADULT - SUBJECTIVE AND OBJECTIVE BOX
HPI:  Mrs. Ai Dumont is a 63-year-old female patient with past medical history of RYGB (Anders-en-Y gastric bypass), hypertension, hyperlipidemia, depression, social EtOH use, who presented to the ED prior to admission after an episode of syncope and fall. She lost consciousness without warning nor with a preceding aura. No prior history of similar episodes. She regained consciousness some time later (unsure about duration) around 9pm but reported feeling too weak to steady herself or get up. She laid on the carpeted floor from 9PM through 7:30AM the following morning before she regained strength to take herself downstairs and call for help. She reported bilateral upper extremity pain, mostly located in the shoulders, right thumb pain, and upper neck pain. She had new onset bilateral upper extremity weakness more profound on the right side. She has since remained alert and oriented. Workup performed demonstrated C5-C6 cervical fractures with no other injuries. Patient was admitted to [facility] on 3/8/24 for surgical management of a cervical spine injury following syncopal fall. CT of the cervical spine reported acute anterior inferior endplate vertebral body fractures of C5 and C6 vertebrae. MR of cervical spine reported advanced multilevel degenerative changes contributing to high-grade multilevel central canal stenosis noting severe central canal stenosis and cord compression at C3-C4, C4-C5 and C5-C6. Following medical and cardiology clearance and optimization, patient was taken to the OR and underwent C3-C7 decompression, posterior spinal fusion with muscle flap reconstruction on 3/9/2024 with Dr. Layton. Plastic surgery consulted intraop to evaluate patient for muscle flap reconstruction of posterior spine wound given wide exposure of spine structures, need for bilateral multilevel hardware placement, use of bone graft requiring healthy vascularized muscle flap coverage of exposed vital structures and extensive foreign body. Patient tolerated procedure well. Patient was placed in hard cervical collar post op. Drain placed intraoperatively by Plastic Surgery team with outputs to be monitored q shift. Drain pulled on 3/14/24 successfully. Patient transferred to the SICU post operatively for q1 neuro checks with eventual downgrade to the floor on 3/13/24. Patient placed on PCA for acute pain control, which was discontinued on 3/10/24 with transition to PO analgesics. CT of the wrist completed due to wrist pain to evaluate for scaphoid fx: negative for acute fx. Patient was evaluated postoperatively by physical and occupational therapists for weight bearing as tolerated ambulation in cervical collar, and advised that patient would benefit from admission to rehab facility. Physical medicine and rehab team evaluated and agreed with the recommendations. Discharge and Orthopedic Care instructions were delineated in the Discharge Plan and reviewed with the patient per documentation. All medications were delineated in the medication reconciliation tool and key points were reviewed with the patient per documentation. Patient was deemed stable from an Orthopedic & medical standpoint at prior facility and was discharge on 3/16/24 with no new reported neuro deficits to be admitted at [facility].  (16 Mar 2024 12:47)    TDD 3/29 home  ___________________________________________________________________________    SUBJECTIVE/ROS  Patient was seen and evaluated at bedside and at therapy today.  Reported no overnight events. Her pain continues to improve.  [id number] medications approved.   Case was discussed at Interdisciplinary Team meeting yesterday.  No changes to the tentative discharge date outlined above.  Patient remains motivated to participate on the recommended rehabilitation program.  Denies any CP, SOB, PULLIAM, palpitations, fever, chills, body aches, cough, congestion, or any other symptoms at this time.   ___________________________________________________________________________    Vital Signs Last 24 Hrs  T(C): 36.5 (26 Mar 2024 07:51), Max: 36.5 (26 Mar 2024 07:51)  T(F): 97.7 (26 Mar 2024 07:51), Max: 97.7 (26 Mar 2024 07:51)  HR: 68 (26 Mar 2024 07:51) (67 - 75)  BP: 99/65 (26 Mar 2024 07:51) (99/65 - 136/80)  RR: 15 (26 Mar 2024 07:51) (15 - 16)  SpO2: 98% (26 Mar 2024 07:51) (98% - 98%)    ___________________________________________________________________________    LAB                          12.4   5.90  )-----------( 384      ( 25 Mar 2024 07:05 )             39.4     03-25    143  |  105  |  17  ----------------------------<  92  3.9   |  28  |  0.78    Ca    9.3      25 Mar 2024 07:05    TPro  6.9  /  Alb  3.2<L>  /  TBili  0.3  /  DBili  x   /  AST  46<H>  /  ALT  47<H>  /  AlkPhos  71  03-25    ___________________________________________________________________________    MEDICATIONS  (STANDING):  acetaminophen     Tablet .. 975 milliGRAM(s) Oral every 8 hours  atorvastatin 10 milliGRAM(s) Oral at bedtime  cyanocobalamin 1000 MICROGram(s) Oral daily  enoxaparin Injectable 40 milliGRAM(s) SubCutaneous every 24 hours  HYDROmorphone   Tablet 2 milliGRAM(s) Oral every 6 hours  lidocaine   4% Patch 1 Patch Transdermal daily  lidocaine   4% Patch 1 Patch Transdermal daily  melatonin 3 milliGRAM(s) Oral at bedtime  multivitamin 1 Tablet(s) Oral daily  naloxegol 12.5 milliGRAM(s) Oral daily  pantoprazole    Tablet 40 milliGRAM(s) Oral before breakfast  polyethylene glycol 3350 17 Gram(s) Oral daily  senna 2 Tablet(s) Oral at bedtime  tiZANidine 4 milliGRAM(s) Oral three times a day    MEDICATIONS  (PRN):  benzocaine/menthol Lozenge 1 Lozenge Oral every 2 hours PRN Sore Throat  bisacodyl Suppository 10 milliGRAM(s) Rectal daily PRN Constipation  oxyCODONE    IR 5 milliGRAM(s) Oral every 4 hours PRN Severe Pain (7 - 10)  traMADol 50 milliGRAM(s) Oral every 6 hours PRN Moderate Pain (4 - 6)    ___________________________________________________________________________    PHYSICAL EXAM:    Constitutional - NAD, Comfortable  HEENT - NCAT, EOMI  Chest - Breathing comfortably, No wheezing  Cardiovascular - S1S2   Abdomen - Soft  Extremities - No C/C/E, No calf tenderness   Neurologic Exam -                    Cognitive - AAO to self, place, date, year, situation     Communication - Fluent, No dysarthria     Cranial Nerves - CN 2-12 grossly intact     Motor -                     LEFT    UE - ShAB 5/5, EF 5/5, EE 5/5, WE 5/5,  5/5                    RIGHT UE - ShAB 3/5, EF 3/5, EE 3/5, WE 3/5,  3/5                    LEFT    LE - HF 5/5, KE 5/5, DF 5/5, PF 5/5                    RIGHT LE - HF 4/5, KE 4/5, DF 4/5, PF 4/5        Sensory - slightly decreased to light touch in all 4 extremities     Reflexes - DTR Intact, No primitive reflexive     Coordination - FTN intact on L, slowed ability on R     OculoVestibular - No nystagmus    Psychiatric - Mood stable, Affect WNL    ___________________________________________________________________________

## 2024-03-27 NOTE — PROGRESS NOTE ADULT - ASSESSMENT
Mrs. Ai Dumont is a 63-year-old female patient with past medical history of RYGB (Anders-en-Y gastric bypass), hypertension, hyperlipidemia, depression, and social EtOH use who is admitted for Acute Inpatient Rehabilitation with a multidisciplinary rehab program at [facility] with functional impairments in ADLs and mobility secondary to a traumatic cervical spinal cord injury after a syncope and fall with loss of consciousness.    Traumatic cervical spinal cord injury  - Central cord syndrome  - Severe central canal stenosis and cord compression at C3-C4, C4-C5 and C5-C6 with C5 and C6 fractures  - S/P C3-7 decompression w/ posterior spinal fusion by Neurosurgery and muscle flap reconstruction by Plastic Surgery on 3/8/24       * Patient to remain in C collar at all times        * Keep surgical dressing clean and dry, have dressing/sutures removed and steri-strips applied post operative day #13 (3/22/24)  - Traumatic brain injury with prolonged loss of consciousness  - Generalized weakness - UEs > LEs  - Impaired ADLs and mobility  - Need for assistance with personal care  - Continue Comprehensive Multidisciplinary Rehab Program:       * PT to focus on BUE paresis in setting of cord compression working on strengthening of extremities as well as transfers and gait training       * OT to focus on functional deficits in setting of BUE paresis  - Pain regimen as below    HTN  - enalapril 5mg daily  - Monitor BP    HLD  - atorvastatin 10mg daily    Pain  - Tylenol 975q8h scheduled,  - Hydromorphone 4mg decreased to 2mg q6 for with holding parameters for hypotension.    - tramadol prn moderate, and oxycodone 5mg prn.   - tizanidine standing 3/20.   - lidocaine patches bilateral shoulders    Sleep  - Maintain quiet hours and a low stim environment.   - Melatonin PRN     GI / Bowel  s/p Anders en Y bypass  - Senna qHS  - Miralax Daily  - GI ppx: protonix  - naloxegol 12.5mg daily     / Bladder  - Currently patient voids independently   - bladder scan once on admission with straight cath for >400cc.    Skin / Pressure injury assessment  - Skin assessment on admission performed   - Monitor Incisions:  posterior cervical incision site  -- removed dressing and staples 3/22.   - Turn q2 hours in bed while awake, air mattress  - nursing to monitor skin qShift  - soft heel protectors  - skin barrier cream PRN    Diet:  - Diet Consistency: DASH  - Supplements: b12 and MVI  - Aspiration Precautions  - Nutrition consult    DVT prophylaxis:   - Lovenox    Outpatient Follow-up:  Pasha Layton  Orthopaedic Surgery  [address], [address]  [place] [address]  Phone: [phone]  Fax: [phone]  Follow Up Time: 2 weeks    f/u Pain management    Code Status/Emergency Contact:  Full Code     Steven - spouse - [phone]  PCP is Dr. Beckman  Patient uses [facility] in [place] as her preferred pharmacy.   ---------------

## 2024-03-27 NOTE — PROGRESS NOTE ADULT - ASSESSMENT
64 y/o F with PMH of RYGB (Anders-en-Y gastric bypass), hypertension, hyperlipidemia, depression, and social EtOH use who is admitted for Acute Inpatient Rehabilitation with a multidisciplinary rehab program at [facility] with functional impairments in ADLs and mobility secondary to a traumatic cervical spinal cord injury after a syncope and fall with loss of consciousness.    #Cervical Cord Syndrome  #Severe C3-6 Canal Stenosis  -s/p C3-7 decompression w/ posterior spinal fusion and muscle flap reconstruction on 3/9  -Maintain C collar     #Syncope, unclear etiology  -CTH/CTA Head and Neck unremarkable  -Echo 3/8 showed grossly normal EF, no significant valvular disease   -s/p telemetry monitoring during her hospitalization   -Will monitor     #HTN  #Now Hypotensive, secondary to pain meds  -Meds held due to low bp  -Adjust pain meds  -Check OH periodically    #HLD  -Continue atorvastatin 10mg daily    #s/p Anders en Y bypass  #GERD  -PPI     #Moderate protein-calorie malnutrition  -Dietary/Nutrition following    #DVT prophylaxis - Lovenox SC

## 2024-03-28 ENCOUNTER — TRANSCRIPTION ENCOUNTER (OUTPATIENT)
Age: 64
End: 2024-03-28

## 2024-03-28 LAB
ALBUMIN SERPL ELPH-MCNC: 3.3 G/DL — SIGNIFICANT CHANGE UP (ref 3.3–5)
ALP SERPL-CCNC: 67 U/L — SIGNIFICANT CHANGE UP (ref 40–120)
ALT FLD-CCNC: 33 U/L — SIGNIFICANT CHANGE UP (ref 10–45)
ANION GAP SERPL CALC-SCNC: 9 MMOL/L — SIGNIFICANT CHANGE UP (ref 5–17)
AST SERPL-CCNC: 23 U/L — SIGNIFICANT CHANGE UP (ref 10–40)
BASOPHILS # BLD AUTO: 0.06 K/UL — SIGNIFICANT CHANGE UP (ref 0–0.2)
BASOPHILS NFR BLD AUTO: 1.3 % — SIGNIFICANT CHANGE UP (ref 0–2)
BILIRUB SERPL-MCNC: 0.4 MG/DL — SIGNIFICANT CHANGE UP (ref 0.2–1.2)
BUN SERPL-MCNC: 18 MG/DL — SIGNIFICANT CHANGE UP (ref 7–23)
CALCIUM SERPL-MCNC: 9.5 MG/DL — SIGNIFICANT CHANGE UP (ref 8.4–10.5)
CHLORIDE SERPL-SCNC: 107 MMOL/L — SIGNIFICANT CHANGE UP (ref 96–108)
CO2 SERPL-SCNC: 27 MMOL/L — SIGNIFICANT CHANGE UP (ref 22–31)
CREAT SERPL-MCNC: 0.76 MG/DL — SIGNIFICANT CHANGE UP (ref 0.5–1.3)
EGFR: 89 ML/MIN/1.73M2 — SIGNIFICANT CHANGE UP
EOSINOPHIL # BLD AUTO: 0.21 K/UL — SIGNIFICANT CHANGE UP (ref 0–0.5)
EOSINOPHIL NFR BLD AUTO: 4.6 % — SIGNIFICANT CHANGE UP (ref 0–6)
GLUCOSE SERPL-MCNC: 94 MG/DL — SIGNIFICANT CHANGE UP (ref 70–99)
HCT VFR BLD CALC: 36.8 % — SIGNIFICANT CHANGE UP (ref 34.5–45)
HGB BLD-MCNC: 12 G/DL — SIGNIFICANT CHANGE UP (ref 11.5–15.5)
IMM GRANULOCYTES NFR BLD AUTO: 0.2 % — SIGNIFICANT CHANGE UP (ref 0–0.9)
LYMPHOCYTES # BLD AUTO: 1.32 K/UL — SIGNIFICANT CHANGE UP (ref 1–3.3)
LYMPHOCYTES # BLD AUTO: 28.7 % — SIGNIFICANT CHANGE UP (ref 13–44)
MCHC RBC-ENTMCNC: 31.6 PG — SIGNIFICANT CHANGE UP (ref 27–34)
MCHC RBC-ENTMCNC: 32.6 GM/DL — SIGNIFICANT CHANGE UP (ref 32–36)
MCV RBC AUTO: 96.8 FL — SIGNIFICANT CHANGE UP (ref 80–100)
MONOCYTES # BLD AUTO: 0.48 K/UL — SIGNIFICANT CHANGE UP (ref 0–0.9)
MONOCYTES NFR BLD AUTO: 10.4 % — SIGNIFICANT CHANGE UP (ref 2–14)
NEUTROPHILS # BLD AUTO: 2.52 K/UL — SIGNIFICANT CHANGE UP (ref 1.8–7.4)
NEUTROPHILS NFR BLD AUTO: 54.8 % — SIGNIFICANT CHANGE UP (ref 43–77)
NRBC # BLD: 0 /100 WBCS — SIGNIFICANT CHANGE UP (ref 0–0)
PLATELET # BLD AUTO: 321 K/UL — SIGNIFICANT CHANGE UP (ref 150–400)
POTASSIUM SERPL-MCNC: 5 MMOL/L — SIGNIFICANT CHANGE UP (ref 3.5–5.3)
POTASSIUM SERPL-SCNC: 5 MMOL/L — SIGNIFICANT CHANGE UP (ref 3.5–5.3)
PROT SERPL-MCNC: 6.7 G/DL — SIGNIFICANT CHANGE UP (ref 6–8.3)
RBC # BLD: 3.8 M/UL — SIGNIFICANT CHANGE UP (ref 3.8–5.2)
RBC # FLD: 12.9 % — SIGNIFICANT CHANGE UP (ref 10.3–14.5)
SODIUM SERPL-SCNC: 143 MMOL/L — SIGNIFICANT CHANGE UP (ref 135–145)
WBC # BLD: 4.6 K/UL — SIGNIFICANT CHANGE UP (ref 3.8–10.5)
WBC # FLD AUTO: 4.6 K/UL — SIGNIFICANT CHANGE UP (ref 3.8–10.5)

## 2024-03-28 PROCEDURE — 99232 SBSQ HOSP IP/OBS MODERATE 35: CPT

## 2024-03-28 RX ADMIN — OXYCODONE HYDROCHLORIDE 5 MILLIGRAM(S): 5 TABLET ORAL at 09:27

## 2024-03-28 RX ADMIN — LIDOCAINE 1 PATCH: 4 CREAM TOPICAL at 11:50

## 2024-03-28 RX ADMIN — HYDROMORPHONE HYDROCHLORIDE 2 MILLIGRAM(S): 2 INJECTION INTRAMUSCULAR; INTRAVENOUS; SUBCUTANEOUS at 12:30

## 2024-03-28 RX ADMIN — HYDROMORPHONE HYDROCHLORIDE 2 MILLIGRAM(S): 2 INJECTION INTRAMUSCULAR; INTRAVENOUS; SUBCUTANEOUS at 11:46

## 2024-03-28 RX ADMIN — HYDROMORPHONE HYDROCHLORIDE 2 MILLIGRAM(S): 2 INJECTION INTRAMUSCULAR; INTRAVENOUS; SUBCUTANEOUS at 06:05

## 2024-03-28 RX ADMIN — Medication 975 MILLIGRAM(S): at 15:00

## 2024-03-28 RX ADMIN — LIDOCAINE 1 PATCH: 4 CREAM TOPICAL at 18:53

## 2024-03-28 RX ADMIN — Medication 3 MILLIGRAM(S): at 21:15

## 2024-03-28 RX ADMIN — OXYCODONE HYDROCHLORIDE 5 MILLIGRAM(S): 5 TABLET ORAL at 22:00

## 2024-03-28 RX ADMIN — HYDROMORPHONE HYDROCHLORIDE 2 MILLIGRAM(S): 2 INJECTION INTRAMUSCULAR; INTRAVENOUS; SUBCUTANEOUS at 18:00

## 2024-03-28 RX ADMIN — TIZANIDINE 4 MILLIGRAM(S): 4 TABLET ORAL at 21:14

## 2024-03-28 RX ADMIN — HYDROMORPHONE HYDROCHLORIDE 2 MILLIGRAM(S): 2 INJECTION INTRAMUSCULAR; INTRAVENOUS; SUBCUTANEOUS at 05:35

## 2024-03-28 RX ADMIN — Medication 975 MILLIGRAM(S): at 22:00

## 2024-03-28 RX ADMIN — HYDROMORPHONE HYDROCHLORIDE 2 MILLIGRAM(S): 2 INJECTION INTRAMUSCULAR; INTRAVENOUS; SUBCUTANEOUS at 17:40

## 2024-03-28 RX ADMIN — OXYCODONE HYDROCHLORIDE 5 MILLIGRAM(S): 5 TABLET ORAL at 21:18

## 2024-03-28 RX ADMIN — Medication 975 MILLIGRAM(S): at 05:35

## 2024-03-28 RX ADMIN — Medication 1 TABLET(S): at 11:46

## 2024-03-28 RX ADMIN — TIZANIDINE 4 MILLIGRAM(S): 4 TABLET ORAL at 14:26

## 2024-03-28 RX ADMIN — OXYCODONE HYDROCHLORIDE 5 MILLIGRAM(S): 5 TABLET ORAL at 04:48

## 2024-03-28 RX ADMIN — Medication 975 MILLIGRAM(S): at 21:14

## 2024-03-28 RX ADMIN — ATORVASTATIN CALCIUM 10 MILLIGRAM(S): 80 TABLET, FILM COATED ORAL at 21:14

## 2024-03-28 RX ADMIN — ENOXAPARIN SODIUM 40 MILLIGRAM(S): 100 INJECTION SUBCUTANEOUS at 20:45

## 2024-03-28 RX ADMIN — HYDROMORPHONE HYDROCHLORIDE 2 MILLIGRAM(S): 2 INJECTION INTRAMUSCULAR; INTRAVENOUS; SUBCUTANEOUS at 18:30

## 2024-03-28 RX ADMIN — Medication 975 MILLIGRAM(S): at 06:05

## 2024-03-28 RX ADMIN — PREGABALIN 1000 MICROGRAM(S): 225 CAPSULE ORAL at 11:46

## 2024-03-28 RX ADMIN — TIZANIDINE 4 MILLIGRAM(S): 4 TABLET ORAL at 05:34

## 2024-03-28 RX ADMIN — LIDOCAINE 1 PATCH: 4 CREAM TOPICAL at 23:00

## 2024-03-28 RX ADMIN — Medication 975 MILLIGRAM(S): at 14:27

## 2024-03-28 RX ADMIN — OXYCODONE HYDROCHLORIDE 5 MILLIGRAM(S): 5 TABLET ORAL at 04:02

## 2024-03-28 RX ADMIN — OXYCODONE HYDROCHLORIDE 5 MILLIGRAM(S): 5 TABLET ORAL at 09:02

## 2024-03-28 RX ADMIN — PANTOPRAZOLE SODIUM 40 MILLIGRAM(S): 20 TABLET, DELAYED RELEASE ORAL at 06:39

## 2024-03-28 NOTE — PROGRESS NOTE ADULT - SUBJECTIVE AND OBJECTIVE BOX
Patient is a 63y old  Female who presents with a chief complaint of Traumatic cervical spinal cord injury (27 Mar 2024 13:29)      SUBJECTIVE / OVERNIGHT EVENTS:  Pt seen and examined at bedside. No acute events overnight.  Pt denies cp, palpitations, sob, abd pain, N/V, fever, chills.    ROS:  All other review of systems negative    Allergies    No Known Allergies    Intolerances        MEDICATIONS  (STANDING):  acetaminophen     Tablet .. 975 milliGRAM(s) Oral every 8 hours  atorvastatin 10 milliGRAM(s) Oral at bedtime  cyanocobalamin 1000 MICROGram(s) Oral daily  enoxaparin Injectable 40 milliGRAM(s) SubCutaneous every 24 hours  HYDROmorphone   Tablet 2 milliGRAM(s) Oral every 6 hours  lidocaine   4% Patch 1 Patch Transdermal daily  lidocaine   4% Patch 1 Patch Transdermal daily  melatonin 3 milliGRAM(s) Oral at bedtime  multivitamin 1 Tablet(s) Oral daily  naloxegol 12.5 milliGRAM(s) Oral daily  pantoprazole    Tablet 40 milliGRAM(s) Oral before breakfast  polyethylene glycol 3350 17 Gram(s) Oral daily  senna 2 Tablet(s) Oral at bedtime  tiZANidine 4 milliGRAM(s) Oral three times a day    MEDICATIONS  (PRN):  benzocaine/menthol Lozenge 1 Lozenge Oral every 2 hours PRN Sore Throat  bisacodyl Suppository 10 milliGRAM(s) Rectal daily PRN Constipation  oxyCODONE    IR 5 milliGRAM(s) Oral every 4 hours PRN Severe Pain (7 - 10)  traMADol 50 milliGRAM(s) Oral every 6 hours PRN Moderate Pain (4 - 6)      Vital Signs Last 24 Hrs  T(C): 36.5 (28 Mar 2024 07:55), Max: 36.6 (27 Mar 2024 20:06)  T(F): 97.7 (28 Mar 2024 07:55), Max: 97.9 (27 Mar 2024 20:06)  HR: 67 (28 Mar 2024 07:55) (67 - 70)  BP: 97/62 (28 Mar 2024 07:55) (97/62 - 119/74)  BP(mean): --  RR: 16 (28 Mar 2024 07:55) (16 - 16)  SpO2: 98% (28 Mar 2024 07:55) (98% - 98%)    Parameters below as of 28 Mar 2024 07:55  Patient On (Oxygen Delivery Method): room air      CAPILLARY BLOOD GLUCOSE        I&O's Summary      PHYSICAL EXAM:  GENERAL: NAD, well-developed AAOx3 female   HEAD:  Atraumatic, Normocephalic  NECK: + C-collar  CHEST/LUNG: Clear to auscultation bilaterally; No wheeze, nonlabored breathing  HEART: Regular rate and rhythm; No murmurs, rubs, or gallops  ABDOMEN: Soft, Nontender, Nondistended; Bowel sounds present  EXTREMITIES:  2+ Peripheral Pulses, No clubbing, cyanosis, or edema    LABS:                        12.0   4.60  )-----------( 321      ( 28 Mar 2024 07:20 )             36.8     03-28    143  |  107  |  18  ----------------------------<  94  5.0   |  27  |  0.76    Ca    9.5      28 Mar 2024 07:20    TPro  6.7  /  Alb  3.3  /  TBili  0.4  /  DBili  x   /  AST  23  /  ALT  33  /  AlkPhos  67  03-28          Urinalysis Basic - ( 28 Mar 2024 07:20 )    Color: x / Appearance: x / SG: x / pH: x  Gluc: 94 mg/dL / Ketone: x  / Bili: x / Urobili: x   Blood: x / Protein: x / Nitrite: x   Leuk Esterase: x / RBC: x / WBC x   Sq Epi: x / Non Sq Epi: x / Bacteria: x        RADIOLOGY & ADDITIONAL TESTS:  Results Reviewed:   Imaging Personally Reviewed:  Electrocardiogram Personally Reviewed:    COORDINATION OF CARE:  Care Discussed with Consultants/Other Providers [Y/N]:  Prior or Outpatient Records Reviewed [Y/N]:

## 2024-03-28 NOTE — PROGRESS NOTE ADULT - SUBJECTIVE AND OBJECTIVE BOX
HPI:  Mrs. Ai Dumont is a 63-year-old female patient with past medical history of RYGB (Anders-en-Y gastric bypass), hypertension, hyperlipidemia, depression, social EtOH use, who presented to the ED prior to admission after an episode of syncope and fall. She lost consciousness without warning nor with a preceding aura. No prior history of similar episodes. She regained consciousness some time later (unsure about duration) around 9pm but reported feeling too weak to steady herself or get up. She laid on the carpeted floor from 9PM through 7:30AM the following morning before she regained strength to take herself downstairs and call for help. She reported bilateral upper extremity pain, mostly located in the shoulders, right thumb pain, and upper neck pain. She had new onset bilateral upper extremity weakness more profound on the right side. She has since remained alert and oriented. Workup performed demonstrated C5-C6 cervical fractures with no other injuries. Patient was admitted to [facility] on 3/8/24 for surgical management of a cervical spine injury following syncopal fall. CT of the cervical spine reported acute anterior inferior endplate vertebral body fractures of C5 and C6 vertebrae. MR of cervical spine reported advanced multilevel degenerative changes contributing to high-grade multilevel central canal stenosis noting severe central canal stenosis and cord compression at C3-C4, C4-C5 and C5-C6. Following medical and cardiology clearance and optimization, patient was taken to the OR and underwent C3-C7 decompression, posterior spinal fusion with muscle flap reconstruction on 3/9/2024 with Dr. Layton. Plastic surgery consulted intraop to evaluate patient for muscle flap reconstruction of posterior spine wound given wide exposure of spine structures, need for bilateral multilevel hardware placement, use of bone graft requiring healthy vascularized muscle flap coverage of exposed vital structures and extensive foreign body. Patient tolerated procedure well. Patient was placed in hard cervical collar post op. Drain placed intraoperatively by Plastic Surgery team with outputs to be monitored q shift. Drain pulled on 3/14/24 successfully. Patient transferred to the SICU post operatively for q1 neuro checks with eventual downgrade to the floor on 3/13/24. Patient placed on PCA for acute pain control, which was discontinued on 3/10/24 with transition to PO analgesics. CT of the wrist completed due to wrist pain to evaluate for scaphoid fx: negative for acute fx. Patient was evaluated postoperatively by physical and occupational therapists for weight bearing as tolerated ambulation in cervical collar, and advised that patient would benefit from admission to rehab facility. Physical medicine and rehab team evaluated and agreed with the recommendations. Discharge and Orthopedic Care instructions were delineated in the Discharge Plan and reviewed with the patient per documentation. All medications were delineated in the medication reconciliation tool and key points were reviewed with the patient per documentation. Patient was deemed stable from an Orthopedic & medical standpoint at prior facility and was discharge on 3/16/24 with no new reported neuro deficits to be admitted at [facility].  (16 Mar 2024 12:47)    TDD 3/29 home  ___________________________________________________________________________    SUBJECTIVE/ROS  Patient was seen and evaluated at bedside and at therapy today.  Reported no overnight events. Her pain continues to improve.  Blon3ibm medications, patient received yesterday .   Patient remains motivated to participate on the recommended rehabilitation program.  Denies any CP, SOB, PULLIAM, palpitations, fever, chills, body aches, cough, congestion, or any other symptoms at this time.   ___________________________________________________________________________    Vital Signs Last 24 Hrs  T(C): 36.5 (28 Mar 2024 07:55), Max: 36.6 (27 Mar 2024 20:06)  T(F): 97.7 (28 Mar 2024 07:55), Max: 97.9 (27 Mar 2024 20:06)  HR: 67 (28 Mar 2024 07:55) (67 - 70)  BP: 97/62 (28 Mar 2024 07:55) (97/62 - 119/74)  BP(mean): --  RR: 16 (28 Mar 2024 07:55) (16 - 16)  SpO2: 98% (28 Mar 2024 07:55) (98% - 98%)    Parameters below as of 28 Mar 2024 07:55  Patient On (Oxygen Delivery Method): room air  ___________________________________________________________________________    LAB               12.0   4.60  )-----------( 321      ( 28 Mar 2024 07:20 )             36.8     03-28    143  |  107  |  18  ----------------------------<  94  5.0   |  27  |  0.76    Ca    9.5      28 Mar 2024 07:20    TPro  6.7  /  Alb  3.3  /  TBili  0.4  /  DBili  x   /  AST  23  /  ALT  33  /  AlkPhos  67  03-28                           12.4   5.90  )-----------( 384      ( 25 Mar 2024 07:05 )             39.4     03-25    143  |  105  |  17  ----------------------------<  92  3.9   |  28  |  0.78    Ca    9.3      25 Mar 2024 07:05    TPro  6.9  /  Alb  3.2<L>  /  TBili  0.3  /  DBili  x   /  AST  46<H>  /  ALT  47<H>  /  AlkPhos  71  03-25    ___________________________________________________________________________    MEDICATIONS  (STANDING):  acetaminophen     Tablet .. 975 milliGRAM(s) Oral every 8 hours  atorvastatin 10 milliGRAM(s) Oral at bedtime  cyanocobalamin 1000 MICROGram(s) Oral daily  enoxaparin Injectable 40 milliGRAM(s) SubCutaneous every 24 hours  HYDROmorphone   Tablet 2 milliGRAM(s) Oral every 6 hours  lidocaine   4% Patch 1 Patch Transdermal daily  lidocaine   4% Patch 1 Patch Transdermal daily  melatonin 3 milliGRAM(s) Oral at bedtime  multivitamin 1 Tablet(s) Oral daily  naloxegol 12.5 milliGRAM(s) Oral daily  pantoprazole    Tablet 40 milliGRAM(s) Oral before breakfast  polyethylene glycol 3350 17 Gram(s) Oral daily  senna 2 Tablet(s) Oral at bedtime  tiZANidine 4 milliGRAM(s) Oral three times a day    MEDICATIONS  (PRN):  benzocaine/menthol Lozenge 1 Lozenge Oral every 2 hours PRN Sore Throat  bisacodyl Suppository 10 milliGRAM(s) Rectal daily PRN Constipation  oxyCODONE    IR 5 milliGRAM(s) Oral every 4 hours PRN Severe Pain (7 - 10)  traMADol 50 milliGRAM(s) Oral every 6 hours PRN Moderate Pain (4 - 6)      ___________________________________________________________________________    PHYSICAL EXAM:    Constitutional - NAD, Comfortable  HEENT - NCAT, EOMI  Chest - Breathing comfortably, No wheezing  Cardiovascular - S1S2   Abdomen - Soft  Extremities - No C/C/E, No calf tenderness   Neurologic Exam -                    Cognitive - AAO to self, place, date, year, situation     Communication - Fluent, No dysarthria     Cranial Nerves - CN 2-12 grossly intact     Motor -                     LEFT    UE - ShAB 5/5, EF 5/5, EE 5/5, WE 5/5,  5/5                    RIGHT UE - ShAB 3/5, EF 3/5, EE 3/5, WE 3/5,  3/5                    LEFT    LE - HF 5/5, KE 5/5, DF 5/5, PF 5/5                    RIGHT LE - HF 4/5, KE 4/5, DF 4/5, PF 4/5        Sensory - slightly decreased to light touch in all 4 extremities     Reflexes - DTR Intact, No primitive reflexive     Coordination - FTN intact on L, slowed ability on R     OculoVestibular - No nystagmus    Psychiatric - Mood stable, Affect WNL    ___________________________________________________________________________ HPI:  Mrs. Ai Dumont is a 63-year-old female patient with past medical history of RYGB (Anders-en-Y gastric bypass), hypertension, hyperlipidemia, depression, social EtOH use, who presented to the ED prior to admission after an episode of syncope and fall. She lost consciousness without warning nor with a preceding aura. No prior history of similar episodes. She regained consciousness some time later (unsure about duration) around 9pm but reported feeling too weak to steady herself or get up. She laid on the carpeted floor from 9PM through 7:30AM the following morning before she regained strength to take herself downstairs and call for help. She reported bilateral upper extremity pain, mostly located in the shoulders, right thumb pain, and upper neck pain. She had new onset bilateral upper extremity weakness more profound on the right side. She has since remained alert and oriented. Workup performed demonstrated C5-C6 cervical fractures with no other injuries. Patient was admitted to [facility] on 3/8/24 for surgical management of a cervical spine injury following syncopal fall. CT of the cervical spine reported acute anterior inferior endplate vertebral body fractures of C5 and C6 vertebrae. MR of cervical spine reported advanced multilevel degenerative changes contributing to high-grade multilevel central canal stenosis noting severe central canal stenosis and cord compression at C3-C4, C4-C5 and C5-C6. Following medical and cardiology clearance and optimization, patient was taken to the OR and underwent C3-C7 decompression, posterior spinal fusion with muscle flap reconstruction on 3/9/2024 with Dr. Layton. Plastic surgery consulted intraop to evaluate patient for muscle flap reconstruction of posterior spine wound given wide exposure of spine structures, need for bilateral multilevel hardware placement, use of bone graft requiring healthy vascularized muscle flap coverage of exposed vital structures and extensive foreign body. Patient tolerated procedure well. Patient was placed in hard cervical collar post op. Drain placed intraoperatively by Plastic Surgery team with outputs to be monitored q shift. Drain pulled on 3/14/24 successfully. Patient transferred to the SICU post operatively for q1 neuro checks with eventual downgrade to the floor on 3/13/24. Patient placed on PCA for acute pain control, which was discontinued on 3/10/24 with transition to PO analgesics. CT of the wrist completed due to wrist pain to evaluate for scaphoid fx: negative for acute fx. Patient was evaluated postoperatively by physical and occupational therapists for weight bearing as tolerated ambulation in cervical collar, and advised that patient would benefit from admission to rehab facility. Physical medicine and rehab team evaluated and agreed with the recommendations. Discharge and Orthopedic Care instructions were delineated in the Discharge Plan and reviewed with the patient per documentation. All medications were delineated in the medication reconciliation tool and key points were reviewed with the patient per documentation. Patient was deemed stable from an Orthopedic & medical standpoint at prior facility and was discharge on 3/16/24 with no new reported neuro deficits to be admitted at [facility].  (16 Mar 2024 12:47)    TDD 3/29 home  ___________________________________________________________________________    SUBJECTIVE/ROS  Patient was seen and evaluated at bedside and at therapy today.  Reported no overnight events. Her pain continues to improve.  Wuah8acl medications, patient received yesterday.  A repeat discussion took place with patient and her  at bedside.  All questions were answered and they expressed understanding and agreement.   Scheduled for discharge home tomorrow.  Patient remains motivated to participate on the recommended rehabilitation program.  Denies any CP, SOB, PULLIAM, palpitations, fever, chills, body aches, cough, congestion, or any other symptoms at this time.   ___________________________________________________________________________    Vital Signs Last 24 Hrs  T(C): 36.5 (28 Mar 2024 07:55), Max: 36.6 (27 Mar 2024 20:06)  T(F): 97.7 (28 Mar 2024 07:55), Max: 97.9 (27 Mar 2024 20:06)  HR: 67 (28 Mar 2024 07:55) (67 - 70)  BP: 97/62 (28 Mar 2024 07:55) (97/62 - 119/74)  RR: 16 (28 Mar 2024 07:55) (16 - 16)  SpO2: 98% (28 Mar 2024 07:55) (98% - 98%)  ___________________________________________________________________________    LAB               12.0   4.60  )-----------( 321      ( 28 Mar 2024 07:20 )             36.8     03-28    143  |  107  |  18  ----------------------------<  94  5.0   |  27  |  0.76    Ca    9.5      28 Mar 2024 07:20    TPro  6.7  /  Alb  3.3  /  TBili  0.4  /  DBili  x   /  AST  23  /  ALT  33  /  AlkPhos  67  03-28                           12.4   5.90  )-----------( 384      ( 25 Mar 2024 07:05 )             39.4     03-25    143  |  105  |  17  ----------------------------<  92  3.9   |  28  |  0.78    Ca    9.3      25 Mar 2024 07:05    TPro  6.9  /  Alb  3.2<L>  /  TBili  0.3  /  DBili  x   /  AST  46<H>  /  ALT  47<H>  /  AlkPhos  71  03-25    ___________________________________________________________________________    MEDICATIONS  (STANDING):  acetaminophen     Tablet .. 975 milliGRAM(s) Oral every 8 hours  atorvastatin 10 milliGRAM(s) Oral at bedtime  cyanocobalamin 1000 MICROGram(s) Oral daily  enoxaparin Injectable 40 milliGRAM(s) SubCutaneous every 24 hours  HYDROmorphone   Tablet 2 milliGRAM(s) Oral every 6 hours  lidocaine   4% Patch 1 Patch Transdermal daily  lidocaine   4% Patch 1 Patch Transdermal daily  melatonin 3 milliGRAM(s) Oral at bedtime  multivitamin 1 Tablet(s) Oral daily  naloxegol 12.5 milliGRAM(s) Oral daily  pantoprazole    Tablet 40 milliGRAM(s) Oral before breakfast  polyethylene glycol 3350 17 Gram(s) Oral daily  senna 2 Tablet(s) Oral at bedtime  tiZANidine 4 milliGRAM(s) Oral three times a day    MEDICATIONS  (PRN):  benzocaine/menthol Lozenge 1 Lozenge Oral every 2 hours PRN Sore Throat  bisacodyl Suppository 10 milliGRAM(s) Rectal daily PRN Constipation  oxyCODONE    IR 5 milliGRAM(s) Oral every 4 hours PRN Severe Pain (7 - 10)  traMADol 50 milliGRAM(s) Oral every 6 hours PRN Moderate Pain (4 - 6)      ___________________________________________________________________________    PHYSICAL EXAM:    Constitutional - NAD, Comfortable  HEENT - NCAT, EOMI  Chest - Breathing comfortably, No wheezing  Cardiovascular - S1S2   Abdomen - Soft  Extremities - No C/C/E, No calf tenderness   Neurologic Exam -                    Cognitive - AAO to self, place, date, year, situation     Communication - Fluent, No dysarthria     Cranial Nerves - CN 2-12 grossly intact     Motor -                     LEFT    UE - ShAB 5/5, EF 5/5, EE 5/5, WE 5/5,  5/5                    RIGHT UE - ShAB 3/5, EF 3/5, EE 3/5, WE 3/5,  3/5                    LEFT    LE - HF 5/5, KE 5/5, DF 5/5, PF 5/5                    RIGHT LE - HF 4/5, KE 4/5, DF 4/5, PF 4/5        Sensory - slightly decreased to light touch in all 4 extremities     Reflexes - DTR Intact, No primitive reflexive     Coordination - FTN intact on L, slowed ability on R     OculoVestibular - No nystagmus    Psychiatric - Mood stable, Affect WNL    ___________________________________________________________________________

## 2024-03-28 NOTE — DISCHARGE NOTE NURSING/CASE MANAGEMENT/SOCIAL WORK - PATIENT PORTAL LINK FT
You can access the FollowMyHealth Patient Portal offered by [facility] by registering at the following website: [email or web address]. By joining [facility]’s FollowMyHealth portal, you will also be able to view your health information using other applications (apps) compatible with our system.

## 2024-03-28 NOTE — PROGRESS NOTE ADULT - ASSESSMENT
Mrs. Ai Dumont is a 63-year-old female patient with past medical history of RYGB (Anders-en-Y gastric bypass), hypertension, hyperlipidemia, depression, and social EtOH use who is admitted for Acute Inpatient Rehabilitation with a multidisciplinary rehab program at [facility] with functional impairments in ADLs and mobility secondary to a traumatic cervical spinal cord injury after a syncope and fall with loss of consciousness.    Traumatic cervical spinal cord injury  - Central cord syndrome  - Severe central canal stenosis and cord compression at C3-C4, C4-C5 and C5-C6 with C5 and C6 fractures  - S/P C3-7 decompression w/ posterior spinal fusion by Neurosurgery and muscle flap reconstruction by Plastic Surgery on 3/8/24       * Patient to remain in C collar at all times        * Keep surgical dressing clean and dry, have dressing/sutures removed and steri-strips applied post operative day #13 (3/22/24)  - Traumatic brain injury with prolonged loss of consciousness  - Generalized weakness - UEs > LEs  - Impaired ADLs and mobility  - Need for assistance with personal care  - Continue Comprehensive Multidisciplinary Rehab Program:       * PT to focus on BUE paresis in setting of cord compression working on strengthening of extremities as well as transfers and gait training       * OT to focus on functional deficits in setting of BUE paresis  - Pain regimen as below    HTN  - enalapril 5mg daily  - Monitor BP    HLD  - atorvastatin 10mg daily    Pain  - Tylenol 975q8h scheduled,  - Hydromorphone 4mg decreased to 2mg q6 for with holding parameters for hypotension.    - tramadol prn moderate, and oxycodone 5mg prn.   - tizanidine standing 3/20.   - lidocaine patches bilateral shoulders    Sleep  - Maintain quiet hours and a low stim environment.   - Melatonin PRN     GI / Bowel  s/p Anders en Y bypass  - Senna qHS  - Miralax Daily  - GI ppx: protonix  - naloxegol 12.5mg daily     / Bladder  - Currently patient voids independently   - bladder scan once on admission with straight cath for >400cc.    Skin / Pressure injury assessment  - Skin assessment on admission performed   - Monitor Incisions:  posterior cervical incision site  -- removed dressing and staples 3/22.   - Turn q2 hours in bed while awake, air mattress  - nursing to monitor skin qShift  - soft heel protectors  - skin barrier cream PRN    Diet:  - Diet Consistency: DASH  - Supplements: b12 and MVI  - Aspiration Precautions  - Nutrition consult    DVT prophylaxis:   - Lovenox    Outpatient Follow-up:  Pasha Layton  Orthopaedic Surgery  [address], [address]  [place] [address]  Phone: [phone]  Fax: [phone]  Follow Up Time: 2 weeks    f/u Pain management    Code Status/Emergency Contact:  Full Code     Steven - spouse - [phone]  PCP is Dr. Beckman  Patient uses [facility] in [place] as her preferred pharmacy.   ---------------   Mrs. Ai Dumont is a 63-year-old female patient with past medical history of RYGB (Anders-en-Y gastric bypass), hypertension, hyperlipidemia, depression, and social EtOH use who is admitted for Acute Inpatient Rehabilitation with a multidisciplinary rehab program at [facility] with functional impairments in ADLs and mobility secondary to a traumatic cervical spinal cord injury after a syncope and fall with loss of consciousness.    Traumatic cervical spinal cord injury  - Central cord syndrome  - Severe central canal stenosis and cord compression at C3-C4, C4-C5 and C5-C6 with C5 and C6 fractures  - S/P C3-7 decompression w/ posterior spinal fusion by Neurosurgery and muscle flap reconstruction by Plastic Surgery on 3/8/24       * Patient to remain in C collar at all times        * Keep surgical dressing clean and dry, have dressing/sutures removed and steri-strips applied post operative day #13 (3/22/24)  - Traumatic brain injury with prolonged loss of consciousness  - Generalized weakness - UEs > LEs  - Impaired ADLs and mobility  - Need for assistance with personal care  - Continue Comprehensive Multidisciplinary Rehab Program:       * PT to focus on BUE paresis in setting of cord compression working on strengthening of extremities as well as transfers and gait training       * OT to focus on functional deficits in setting of BUE paresis  - Pain regimen as below  - Scheduled for discharge home tomorrow    HTN  - enalapril 5mg daily  - Monitor BP    HLD  - atorvastatin 10mg daily    Pain  - Tylenol 975q8h scheduled,  - Hydromorphone 4mg decreased to 2mg q6 for with holding parameters for hypotension.    - tramadol prn moderate, and oxycodone 5mg prn.   - tizanidine standing 3/20.   - lidocaine patches bilateral shoulders    Sleep  - Maintain quiet hours and a low stim environment.   - Melatonin PRN     GI / Bowel  s/p Anders en Y bypass  - Senna qHS  - Miralax Daily  - GI ppx: protonix  - naloxegol 12.5mg daily     / Bladder  - Currently patient voids independently   - bladder scan once on admission with straight cath for >400cc.    Skin / Pressure injury assessment  - Skin assessment on admission performed   - Monitor Incisions:  posterior cervical incision site  -- removed dressing and staples 3/22.   - Turn q2 hours in bed while awake, air mattress  - nursing to monitor skin qShift  - soft heel protectors  - skin barrier cream PRN    Diet:  - Diet Consistency: DASH  - Supplements: b12 and MVI  - Aspiration Precautions  - Nutrition consult    DVT prophylaxis:   - Lovenox    Outpatient Follow-up:  Pasha Layton  Orthopaedic Surgery  [address] [place] [address], [facility] [address]  [place] [address]  Phone: [phone]  Fax: [phone]  Follow Up Time: 2 weeks    f/u Pain management    Code Status/Emergency Contact:  Full Code     Steven - spouse - [phone]  PCP is Dr. Beckman  Patient uses [facility] in [place] as her preferred pharmacy.   ---------------

## 2024-03-28 NOTE — DISCHARGE NOTE NURSING/CASE MANAGEMENT/SOCIAL WORK - NSTOBACCONEVERSMOKERY/N_GEN_A
Encounter addended by: Jessie Fernandez M.D. on: 12/12/2018 12:16 PM<BR>    Actions taken: Edit attestation on clinical note No

## 2024-03-28 NOTE — PROGRESS NOTE ADULT - ASSESSMENT
62 y/o F with PMH of RYGB (Anders-en-Y gastric bypass), hypertension, hyperlipidemia, depression, and social EtOH use who is admitted for Acute Inpatient Rehabilitation with a multidisciplinary rehab program at [facility] with functional impairments in ADLs and mobility secondary to a traumatic cervical spinal cord injury after a syncope and fall with loss of consciousness.    #Cervical Cord Syndrome  #Severe C3-6 Canal Stenosis  -s/p C3-7 decompression w/ posterior spinal fusion and muscle flap reconstruction on 3/9  -Maintain C collar     #Syncope, unclear etiology  -CTH/CTA Head and Neck unremarkable  -Echo 3/8 showed grossly normal EF, no significant valvular disease   -s/p telemetry monitoring during her hospitalization   -Will monitor     #HTN  #Now Hypotensive, secondary to pain meds  -Meds held due to low bp  -Adjust pain meds  -Check OH periodically    #HLD  -Continue atorvastatin 10mg daily    #s/p Anders en Y bypass  #GERD  -PPI     #Moderate protein-calorie malnutrition  -Dietary/Nutrition following    #DVT prophylaxis - Lovenox SC

## 2024-03-29 ENCOUNTER — NON-APPOINTMENT (OUTPATIENT)
Age: 64
End: 2024-03-29

## 2024-03-29 VITALS — TEMPERATURE: 98 F | HEART RATE: 64 BPM | RESPIRATION RATE: 16 BRPM | OXYGEN SATURATION: 94 %

## 2024-03-29 PROCEDURE — 80053 COMPREHEN METABOLIC PANEL: CPT

## 2024-03-29 PROCEDURE — 36415 COLL VENOUS BLD VENIPUNCTURE: CPT

## 2024-03-29 PROCEDURE — 97116 GAIT TRAINING THERAPY: CPT | Mod: GP

## 2024-03-29 PROCEDURE — 97161 PT EVAL LOW COMPLEX 20 MIN: CPT | Mod: GP

## 2024-03-29 PROCEDURE — 97165 OT EVAL LOW COMPLEX 30 MIN: CPT | Mod: GO

## 2024-03-29 PROCEDURE — 85025 COMPLETE CBC W/AUTO DIFF WBC: CPT

## 2024-03-29 PROCEDURE — 97112 NEUROMUSCULAR REEDUCATION: CPT | Mod: GP

## 2024-03-29 PROCEDURE — 99239 HOSP IP/OBS DSCHRG MGMT >30: CPT

## 2024-03-29 PROCEDURE — 97110 THERAPEUTIC EXERCISES: CPT | Mod: GP

## 2024-03-29 PROCEDURE — 97530 THERAPEUTIC ACTIVITIES: CPT | Mod: GO

## 2024-03-29 PROCEDURE — 99232 SBSQ HOSP IP/OBS MODERATE 35: CPT

## 2024-03-29 PROCEDURE — 87637 SARSCOV2&INF A&B&RSV AMP PRB: CPT

## 2024-03-29 PROCEDURE — 97535 SELF CARE MNGMENT TRAINING: CPT | Mod: GO

## 2024-03-29 RX ADMIN — HYDROMORPHONE HYDROCHLORIDE 2 MILLIGRAM(S): 2 INJECTION INTRAMUSCULAR; INTRAVENOUS; SUBCUTANEOUS at 06:44

## 2024-03-29 RX ADMIN — Medication 1 TABLET(S): at 11:28

## 2024-03-29 RX ADMIN — PREGABALIN 1000 MICROGRAM(S): 225 CAPSULE ORAL at 11:28

## 2024-03-29 RX ADMIN — Medication 975 MILLIGRAM(S): at 06:44

## 2024-03-29 RX ADMIN — TRAMADOL HYDROCHLORIDE 50 MILLIGRAM(S): 50 TABLET ORAL at 04:34

## 2024-03-29 RX ADMIN — OXYCODONE HYDROCHLORIDE 5 MILLIGRAM(S): 5 TABLET ORAL at 09:42

## 2024-03-29 RX ADMIN — TIZANIDINE 4 MILLIGRAM(S): 4 TABLET ORAL at 06:44

## 2024-03-29 RX ADMIN — HYDROMORPHONE HYDROCHLORIDE 2 MILLIGRAM(S): 2 INJECTION INTRAMUSCULAR; INTRAVENOUS; SUBCUTANEOUS at 01:42

## 2024-03-29 RX ADMIN — PANTOPRAZOLE SODIUM 40 MILLIGRAM(S): 20 TABLET, DELAYED RELEASE ORAL at 06:44

## 2024-03-29 RX ADMIN — LIDOCAINE 1 PATCH: 4 CREAM TOPICAL at 11:29

## 2024-03-29 RX ADMIN — HYDROMORPHONE HYDROCHLORIDE 2 MILLIGRAM(S): 2 INJECTION INTRAMUSCULAR; INTRAVENOUS; SUBCUTANEOUS at 01:02

## 2024-03-29 NOTE — PROGRESS NOTE ADULT - ASSESSMENT
[Mother] : mother Mrs. Ai Dumont is a 63-year-old female patient with past medical history of RYGB (Anders-en-Y gastric bypass), hypertension, hyperlipidemia, depression, and social EtOH use who is admitted for Acute Inpatient Rehabilitation with a multidisciplinary rehab program at [facility] with functional impairments in ADLs and mobility secondary to a traumatic cervical spinal cord injury after a syncope and fall with loss of consciousness.    Traumatic cervical spinal cord injury  - Central cord syndrome  - Severe central canal stenosis and cord compression at C3-C4, C4-C5 and C5-C6 with C5 and C6 fractures  - S/P C3-7 decompression w/ posterior spinal fusion by Neurosurgery and muscle flap reconstruction by Plastic Surgery on 3/8/24       * Patient to remain in C collar at all times        * Keep surgical dressing clean and dry, have dressing/sutures removed and steri-strips applied post operative day #13 (3/22/24)  - Traumatic brain injury with prolonged loss of consciousness  - Generalized weakness - UEs > LEs  - Impaired ADLs and mobility  - Need for assistance with personal care  - Continue Comprehensive Multidisciplinary Rehab Program:       * PT to focus on BUE paresis in setting of cord compression working on strengthening of extremities as well as transfers and gait training       * OT to focus on functional deficits in setting of BUE paresis  - Pain regimen as below  - Scheduled for discharge home today    HTN  - enalapril 5mg daily  - Monitor BP    HLD  - atorvastatin 10mg daily    Pain  - Tylenol 975q8h scheduled,  - Hydromorphone 4mg decreased to 2mg q6 for with holding parameters for hypotension.    - tramadol prn moderate, and oxycodone 5mg prn.   - tizanidine standing 3/20.   - lidocaine patches bilateral shoulders    Sleep  - Maintain quiet hours and a low stim environment.   - Melatonin PRN     GI / Bowel  s/p Anders en Y bypass  - Senna qHS  - Miralax Daily  - GI ppx: protonix  - naloxegol 12.5mg daily     / Bladder  - Currently patient voids independently   - bladder scan once on admission with straight cath for >400cc.    Skin / Pressure injury assessment  - Skin assessment on admission performed   - Monitor Incisions:  posterior cervical incision site  -- removed dressing and staples 3/22.   - Turn q2 hours in bed while awake, air mattress  - nursing to monitor skin qShift  - soft heel protectors  - skin barrier cream PRN    Diet:  - Diet Consistency: DASH  - Supplements: b12 and MVI  - Aspiration Precautions  - Nutrition consult    DVT prophylaxis:   - Lovenox    Outpatient Follow-up:  Pasha Layton  Orthopaedic Surgery  [address] [place] [address], [address]  [place] [address]  Phone: [phone]  Fax: [phone]  Follow Up Time: 2 weeks    f/u Pain management    Code Status/Emergency Contact:  Full Code     Steven - spouse - [phone]  PCP is Dr. Beckman  [facility] uses [facility] [place] as her preferred [facility].   ---------------

## 2024-03-29 NOTE — PROGRESS NOTE ADULT - SUBJECTIVE AND OBJECTIVE BOX
HPI:  Mrs. Ai Dumont is a 63-year-old female patient with past medical history of RYGB (Anders-en-Y gastric bypass), hypertension, hyperlipidemia, depression, social EtOH use, who presented to the ED prior to admission after an episode of syncope and fall. She lost consciousness without warning nor with a preceding aura. No prior history of similar episodes. She regained consciousness some time later (unsure about duration) around 9pm but reported feeling too weak to steady herself or get up. She laid on the carpeted floor from 9PM through 7:30AM the following morning before she regained strength to take herself downstairs and call for help. She reported bilateral upper extremity pain, mostly located in the shoulders, right thumb pain, and upper neck pain. She had new onset bilateral upper extremity weakness more profound on the right side. She has since remained alert and oriented. Workup performed demonstrated C5-C6 cervical fractures with no other injuries. Patient was admitted to [facility] on 3/8/24 for surgical management of a cervical spine injury following syncopal fall. CT of the cervical spine reported acute anterior inferior endplate vertebral body fractures of C5 and C6 vertebrae. MR of cervical spine reported advanced multilevel degenerative changes contributing to high-grade multilevel central canal stenosis noting severe central canal stenosis and cord compression at C3-C4, C4-C5 and C5-C6. Following medical and cardiology clearance and optimization, patient was taken to the OR and underwent C3-C7 decompression, posterior spinal fusion with muscle flap reconstruction on 3/9/2024 with Dr. Layton. Plastic surgery consulted intraop to evaluate patient for muscle flap reconstruction of posterior spine wound given wide exposure of spine structures, need for bilateral multilevel hardware placement, use of bone graft requiring healthy vascularized muscle flap coverage of exposed vital structures and extensive foreign body. Patient tolerated procedure well. Patient was placed in hard cervical collar post op. Drain placed intraoperatively by Plastic Surgery team with outputs to be monitored q shift. Drain pulled on 3/14/24 successfully. Patient transferred to the SICU post operatively for q1 neuro checks with eventual downgrade to the floor on 3/13/24. Patient placed on PCA for acute pain control, which was discontinued on 3/10/24 with transition to PO analgesics. CT of the wrist completed due to wrist pain to evaluate for scaphoid fx: negative for acute fx. Patient was evaluated postoperatively by physical and occupational therapists for weight bearing as tolerated ambulation in cervical collar, and advised that patient would benefit from admission to rehab facility. Physical medicine and rehab team evaluated and agreed with the recommendations. Discharge and Orthopedic Care instructions were delineated in the Discharge Plan and reviewed with the patient per documentation. All medications were delineated in the medication reconciliation tool and key points were reviewed with the patient per documentation. Patient was deemed stable from an Orthopedic & medical standpoint at prior facility and was discharge on 3/16/24 with no new reported neuro deficits to be admitted at [facility].  (16 Mar 2024 12:47)    TDD 3/29 home  ___________________________________________________________________________    SUBJECTIVE/ROS  Patient was seen and evaluated at bedside and at therapy today.  Reported no overnight events. Scheduled for discharge home today.  Patient remains motivated to participate on the recommended rehabilitation program as outpatient.  Denies any CP, SOB, PULLIAM, palpitations, fever, chills, body aches, cough, congestion, or any other symptoms at this time.   ___________________________________________________________________________    Vital Signs Last 24 Hrs  T(C): 36.4 (29 Mar 2024 07:56), Max: 36.6 (28 Mar 2024 21:11)  T(F): 97.6 (29 Mar 2024 07:56), Max: 97.8 (28 Mar 2024 21:11)  HR: 64 (29 Mar 2024 07:56) (64 - 81)  BP: 111/61 (29 Mar 2024 01:00) (111/61 - 114/75)  RR: 16 (29 Mar 2024 07:56) (16 - 16)  SpO2: 94% (29 Mar 2024 07:56) (94% - 97%)    ___________________________________________________________________________    LAB               12.0   4.60  )-----------( 321      ( 28 Mar 2024 07:20 )             36.8     03-28    143  |  107  |  18  ----------------------------<  94  5.0   |  27  |  0.76    Ca    9.5      28 Mar 2024 07:20    TPro  6.7  /  Alb  3.3  /  TBili  0.4  /  DBili  x   /  AST  23  /  ALT  33  /  AlkPhos  67  03-28                           12.4   5.90  )-----------( 384      ( 25 Mar 2024 07:05 )             39.4     03-25    143  |  105  |  17  ----------------------------<  92  3.9   |  28  |  0.78    Ca    9.3      25 Mar 2024 07:05    TPro  6.9  /  Alb  3.2<L>  /  TBili  0.3  /  DBili  x   /  AST  46<H>  /  ALT  47<H>  /  AlkPhos  71  03-25    ___________________________________________________________________________    MEDICATIONS  (STANDING):  acetaminophen     Tablet .. 975 milliGRAM(s) Oral every 8 hours  atorvastatin 10 milliGRAM(s) Oral at bedtime  cyanocobalamin 1000 MICROGram(s) Oral daily  enoxaparin Injectable 40 milliGRAM(s) SubCutaneous every 24 hours  HYDROmorphone   Tablet 2 milliGRAM(s) Oral every 6 hours  lidocaine   4% Patch 1 Patch Transdermal daily  lidocaine   4% Patch 1 Patch Transdermal daily  melatonin 3 milliGRAM(s) Oral at bedtime  multivitamin 1 Tablet(s) Oral daily  naloxegol 12.5 milliGRAM(s) Oral daily  pantoprazole    Tablet 40 milliGRAM(s) Oral before breakfast  polyethylene glycol 3350 17 Gram(s) Oral daily  senna 2 Tablet(s) Oral at bedtime  tiZANidine 4 milliGRAM(s) Oral three times a day    MEDICATIONS  (PRN):  benzocaine/menthol Lozenge 1 Lozenge Oral every 2 hours PRN Sore Throat  bisacodyl Suppository 10 milliGRAM(s) Rectal daily PRN Constipation  oxyCODONE    IR 5 milliGRAM(s) Oral every 4 hours PRN Severe Pain (7 - 10)  traMADol 50 milliGRAM(s) Oral every 6 hours PRN Moderate Pain (4 - 6)      ___________________________________________________________________________    PHYSICAL EXAM:    Constitutional - NAD, Comfortable  HEENT - NCAT, EOMI  Chest - Breathing comfortably, No wheezing  Cardiovascular - S1S2   Abdomen - Soft  Extremities - No C/C/E, No calf tenderness   Neurologic Exam -                    Cognitive - AAO to self, place, date, year, situation     Communication - Fluent, No dysarthria     Cranial Nerves - CN 2-12 grossly intact     Motor -                     LEFT    UE - ShAB 5/5, EF 5/5, EE 5/5, WE 5/5,  5/5                    RIGHT UE - ShAB 3/5, EF 3/5, EE 3/5, WE 3/5,  3/5                    LEFT    LE - HF 5/5, KE 5/5, DF 5/5, PF 5/5                    RIGHT LE - HF 4/5, KE 4/5, DF 4/5, PF 4/5        Sensory - slightly decreased to light touch in all 4 extremities     Reflexes - DTR Intact, No primitive reflexive     Coordination - FTN intact on L, slowed ability on R     OculoVestibular - No nystagmus    Psychiatric - Mood stable, Affect WNL    ___________________________________________________________________________

## 2024-03-29 NOTE — PROGRESS NOTE ADULT - PROVIDER SPECIALTY LIST ADULT
Hospitalist
Physiatry
Hospitalist
Internal Medicine
Physiatry
Physiatry
Rehab Medicine
Hospitalist
Internal Medicine
Physiatry
Hospitalist
Physiatry
Hospitalist
Physiatry

## 2024-03-29 NOTE — PROGRESS NOTE ADULT - SUBJECTIVE AND OBJECTIVE BOX
Patient is a 63y old  Female who presents with a chief complaint of Traumatic cervical spinal cord injury (29 Mar 2024 09:19)      SUBJECTIVE / OVERNIGHT EVENTS:  Pt seen and examined at bedside. No acute events overnight.  Pt denies cp, palpitations, sob, abd pain, N/V, fever, chills.    ROS:  All other review of systems negative    Allergies    No Known Allergies    Intolerances        MEDICATIONS  (STANDING):  acetaminophen     Tablet .. 975 milliGRAM(s) Oral every 8 hours  atorvastatin 10 milliGRAM(s) Oral at bedtime  cyanocobalamin 1000 MICROGram(s) Oral daily  enoxaparin Injectable 40 milliGRAM(s) SubCutaneous every 24 hours  HYDROmorphone   Tablet 2 milliGRAM(s) Oral every 6 hours  lidocaine   4% Patch 1 Patch Transdermal daily  lidocaine   4% Patch 1 Patch Transdermal daily  melatonin 3 milliGRAM(s) Oral at bedtime  multivitamin 1 Tablet(s) Oral daily  naloxegol 12.5 milliGRAM(s) Oral daily  pantoprazole    Tablet 40 milliGRAM(s) Oral before breakfast  polyethylene glycol 3350 17 Gram(s) Oral daily  senna 2 Tablet(s) Oral at bedtime  tiZANidine 4 milliGRAM(s) Oral three times a day    MEDICATIONS  (PRN):  benzocaine/menthol Lozenge 1 Lozenge Oral every 2 hours PRN Sore Throat  bisacodyl Suppository 10 milliGRAM(s) Rectal daily PRN Constipation  oxyCODONE    IR 5 milliGRAM(s) Oral every 4 hours PRN Severe Pain (7 - 10)  traMADol 50 milliGRAM(s) Oral every 6 hours PRN Moderate Pain (4 - 6)      Vital Signs Last 24 Hrs  T(C): 36.4 (29 Mar 2024 07:56), Max: 36.6 (28 Mar 2024 21:11)  T(F): 97.6 (29 Mar 2024 07:56), Max: 97.8 (28 Mar 2024 21:11)  HR: 64 (29 Mar 2024 07:56) (64 - 81)  BP: 111/61 (29 Mar 2024 01:00) (111/61 - 114/75)  BP(mean): --  RR: 16 (29 Mar 2024 07:56) (16 - 16)  SpO2: 94% (29 Mar 2024 07:56) (94% - 97%)    Parameters below as of 29 Mar 2024 07:56  Patient On (Oxygen Delivery Method): room air      CAPILLARY BLOOD GLUCOSE        I&O's Summary      PHYSICAL EXAM:  GENERAL: NAD, well-developed AAOx3 female   HEAD:  Atraumatic, Normocephalic  NECK: + C-collar  CHEST/LUNG: Clear to auscultation bilaterally; No wheeze, nonlabored breathing  HEART: Regular rate and rhythm; No murmurs, rubs, or gallops  ABDOMEN: Soft, Nontender, Nondistended; Bowel sounds present  EXTREMITIES:  2+ Peripheral Pulses, No clubbing, cyanosis, or edema    LABS:                        12.0   4.60  )-----------( 321      ( 28 Mar 2024 07:20 )             36.8     03-28    143  |  107  |  18  ----------------------------<  94  5.0   |  27  |  0.76    Ca    9.5      28 Mar 2024 07:20    TPro  6.7  /  Alb  3.3  /  TBili  0.4  /  DBili  x   /  AST  23  /  ALT  33  /  AlkPhos  67  03-28          Urinalysis Basic - ( 28 Mar 2024 07:20 )    Color: x / Appearance: x / SG: x / pH: x  Gluc: 94 mg/dL / Ketone: x  / Bili: x / Urobili: x   Blood: x / Protein: x / Nitrite: x   Leuk Esterase: x / RBC: x / WBC x   Sq Epi: x / Non Sq Epi: x / Bacteria: x        RADIOLOGY & ADDITIONAL TESTS:  Results Reviewed:   Imaging Personally Reviewed:  Electrocardiogram Personally Reviewed:    COORDINATION OF CARE:  Care Discussed with Consultants/Other Providers [Y/N]:  Prior or Outpatient Records Reviewed [Y/N]:

## 2024-03-29 NOTE — PROGRESS NOTE ADULT - NUTRITIONAL ASSESSMENT
This patient has been assessed with a concern for Malnutrition and has been determined to have a diagnosis/diagnoses of Moderate protein-calorie malnutrition.    This patient is being managed with:   Diet Regular-  Entered: Mar 26 2024 11:22AM  
This patient has been assessed with a concern for Malnutrition and has been determined to have a diagnosis/diagnoses of Moderate protein-calorie malnutrition.    This patient is being managed with:   Diet Regular-  Supplement Feeding Modality:  Oral  Ensure Max Cans or Servings Per Day:  1       Frequency:  Daily  Entered: Mar 18 2024 10:41AM  
This patient has been assessed with a concern for Malnutrition and has been determined to have a diagnosis/diagnoses of Moderate protein-calorie malnutrition.    This patient is being managed with:   Diet Regular-  Entered: Mar 26 2024 11:22AM  
This patient has been assessed with a concern for Malnutrition and has been determined to have a diagnosis/diagnoses of Moderate protein-calorie malnutrition.    This patient is being managed with:   Diet Regular-  Supplement Feeding Modality:  Oral  Ensure Max Cans or Servings Per Day:  1       Frequency:  Daily  Entered: Mar 18 2024 10:41AM  
This patient has been assessed with a concern for Malnutrition and has been determined to have a diagnosis/diagnoses of Moderate protein-calorie malnutrition.    This patient is being managed with:   Diet Regular-  Entered: Mar 26 2024 11:22AM  
This patient has been assessed with a concern for Malnutrition and has been determined to have a diagnosis/diagnoses of Moderate protein-calorie malnutrition.    This patient is being managed with:   Diet Regular-  Supplement Feeding Modality:  Oral  Ensure Max Cans or Servings Per Day:  1       Frequency:  Daily  Entered: Mar 18 2024 10:41AM  

## 2024-03-29 NOTE — PROGRESS NOTE ADULT - REASON FOR ADMISSION
Traumatic cervical spinal cord injury
Central Cord syndrome from severe C3-6 canal stenosis s/p C3-7 decompression w/ posterior spinal fusion and muscle flap reconstruction
Traumatic cervical spinal cord injury

## 2024-03-29 NOTE — PROGRESS NOTE ADULT - ASSESSMENT
64 y/o F with PMH of RYGB (Anders-en-Y gastric bypass), hypertension, hyperlipidemia, depression, and social EtOH use who is admitted for Acute Inpatient Rehabilitation with a multidisciplinary rehab program at [facility] with functional impairments in ADLs and mobility secondary to a traumatic cervical spinal cord injury after a syncope and fall with loss of consciousness.    #Cervical Cord Syndrome  #Severe C3-6 Canal Stenosis  -s/p C3-7 decompression w/ posterior spinal fusion and muscle flap reconstruction on 3/9  -Maintain C collar     #Syncope, unclear etiology  -CTH/CTA Head and Neck unremarkable  -Echo 3/8 showed grossly normal EF, no significant valvular disease   -s/p telemetry monitoring during her hospitalization   -Will monitor     #HTN  #Now Hypotensive, secondary to pain meds  -Meds held due to low bp    #HLD  -Continue atorvastatin 10mg daily    #s/p Anders en Y bypass  #GERD  -PPI     #Moderate protein-calorie malnutrition  -Dietary/Nutrition following    #DVT prophylaxis - Lovenox SC    Stable for discharge from medical standpoints

## 2024-04-01 ENCOUNTER — APPOINTMENT (OUTPATIENT)
Dept: ORTHOPEDIC SURGERY | Facility: CLINIC | Age: 64
End: 2024-04-01
Payer: COMMERCIAL

## 2024-04-01 VITALS — BODY MASS INDEX: 25.1 KG/M2 | HEIGHT: 64 IN | WEIGHT: 147 LBS

## 2024-04-01 PROCEDURE — 99024 POSTOP FOLLOW-UP VISIT: CPT

## 2024-04-01 PROCEDURE — 72040 X-RAY EXAM NECK SPINE 2-3 VW: CPT

## 2024-04-01 RX ORDER — GABAPENTIN 100 MG/1
100 CAPSULE ORAL TWICE DAILY
Qty: 42 | Refills: 0 | Status: ACTIVE | COMMUNITY
Start: 2024-04-01 | End: 1900-01-01

## 2024-04-01 NOTE — HISTORY OF PRESENT ILLNESS
[de-identified] : Patient is a 63-year-old female who presents for f/u eval s/p 3 weeks C3-C7 decompression fusion 03.09. Patient was discharged from rehab on 03.29. Patient details shooting pain down her back and LT shoulder pain. Wearing collar for support. Took 2 pills of Oxycodone since she has been home. Taking hydro morphine as prescribed. Persistent paresthesia in RT hand.

## 2024-04-01 NOTE — ADDENDUM
[FreeTextEntry1] :  I, Feli Agosto, acted solely as a scribe for Dr. Pasha Layton MD on this date 04/01/2024   All medical record entries made by the Scribe were at my, Dr. Pasha Layton MD., direction and personally dictated by me on 04/01/2024. I have reviewed the chart and agree that the record accurately reflects my personal performance of the history, physical exam, assessment and plan. I have also personally directed, reviewed, and agreed with the chart.

## 2024-04-01 NOTE — ASSESSMENT
[FreeTextEntry1] : This is a 63-year-old F s/p 3 weeks C3-C7 decompression fusion. Overall, I am pleased with the progress she has made thus far. There are no red flag findings on imaging nor are there any red flag findings on clinical exams. Therefore, we will proceed with a course of conservative treatment. This would include continuing to wear cervical collar for next 4 to 5 weeks, home exercise program, Gabapentin, Tylenol, NSAIDs as medically indicated. Encouraged patient to taper down off hydro morphine. The patient will follow up with me in approximately 5 weeks. I encouraged the patient to follow-up sooner if there are any new or worsening symptoms.

## 2024-04-01 NOTE — PHYSICAL EXAM
[de-identified] : Cervical Physical Exam   Gait - Normal   Station - Normal   Sagittal balance - Normal   Compensatory mechanism? - None   Horizontal gaze - Maintained     Reflexes   Biceps - Normal   Triceps - Normal   Brachioradialis - Normal   Patellar - Normal   Gastroc - Normal   Clonus -No   Hoffmans - None   Shoulder exam- normal   Spurling's - None   Wrist Pulses -2+ radial/ulnar   Foot Pulses -2+ DP/PT   Cervical range of motion - Normal   Sensation   C5-T1 sensation intact to light touch bilaterally   L1-S1 sensation intact to light touch bilaterally   Motor          Deltoid Bicep Triceps WF WE IO  Right    4/5 4/5 5/5 5/5 5/5 5/5 5/5 Left      5/5 5/5 5/5 5/5 5/5 5/5 5/5             IP Quad HS TA Gastroc EHL Right 5/5 5/5 5/5 5/5 5/5 5/5 Left   5/5 5/5 5/5 5/5 5/5 5/5  Incision Healing  [de-identified] : Cervical Rads hardware in appropriate position  No acute complication

## 2024-04-10 ENCOUNTER — APPOINTMENT (OUTPATIENT)
Dept: ORTHOPEDIC SURGERY | Facility: CLINIC | Age: 64
End: 2024-04-10
Payer: COMMERCIAL

## 2024-04-10 DIAGNOSIS — M75.02 ADHESIVE CAPSULITIS OF LEFT SHOULDER: ICD-10-CM

## 2024-04-10 PROCEDURE — 99204 OFFICE O/P NEW MOD 45 MIN: CPT

## 2024-04-10 NOTE — PHYSICAL EXAM
[de-identified] : Cervical Physical Exam  Gait - Normal  Station - Normal  Sagittal balance - Normal  Compensatory mechanism? - None  Horizontal gaze - Maintained   Reflexes  Biceps - Normal  Triceps - Normal  Brachioradialis - Normal  Patellar - Normal  Gastroc - Normal  Clonus -No  Hoffmans - None  Shoulder exam-decreased range of motion, active and passive range of motion with forward elevation in the scapular plane reduced  Spurling's - None  Wrist Pulses -2+ radial/ulnar  Foot Pulses -2+ DP/PT  Cervical range of motion - Neck brace   Sensation  C5-T1 sensation intact to light touch bilaterally  L1-S1 sensation intact to light touch bilaterally  Motor  Deltoid Bicep Triceps WF WE IO  Right 4/5 4/5 5/5 5/5 5/5 5/5 5/5 Left 5/5 5/5 5/5 5/5 5/5 5/5 5/5   IP Quad HS TA Gastroc EHL Right 5/5 5/5 5/5 5/5 5/5 5/5 Left 5/5 5/5 5/5 5/5 5/5 5/5  Incision healed  [de-identified] : Shoulder radiographs reviewed No fracture noted Glenohumeral joint reduced Significant AC arthritis bilaterally

## 2024-04-10 NOTE — HISTORY OF PRESENT ILLNESS
[de-identified] : Today the patient is approximately 4 weeks out from her posterior cervical decompression fusion procedure.  She feels that she has had an acute exacerbation of left shoulder pain.  She denies any bowel bladder issues.  She denies any saddle anesthesia.  She is making progress in terms of her ambulatory status.  She feels like she has decreased range of motion in her left shoulder.  She is here for evaluation and treatment.  04/01/24 Patient is a 63-year-old female who presents for f/u eval s/p 3 weeks C3-C7 decompression fusion 03.09. Patient was discharged from rehab on 03.29. Patient details shooting pain down her back and LT shoulder pain. Wearing collar for support. Took 2 pills of Oxycodone since she has been home. Taking hydro morphine as prescribed. Persistent paresthesia in RT hand.

## 2024-04-10 NOTE — ASSESSMENT
[FreeTextEntry1] : I had a long discussion with the patient regards to her treatment plan and diagnosis.  At this point we will focus on conservative management of what appears to be left shoulder adhesive capsulitis.  We will start with physical therapy and diclofenac.  I would like to see her back in 2 to 3 weeks.  At that time we may consider a shoulder injection.  All questions were answered.

## 2024-04-18 RX ORDER — DICLOFENAC SODIUM 75 MG/1
75 TABLET, DELAYED RELEASE ORAL
Qty: 30 | Refills: 0 | Status: ACTIVE | COMMUNITY
Start: 2024-04-10 | End: 1900-01-01

## 2024-04-29 ENCOUNTER — APPOINTMENT (OUTPATIENT)
Dept: ORTHOPEDIC SURGERY | Facility: CLINIC | Age: 64
End: 2024-04-29
Payer: COMMERCIAL

## 2024-04-29 VITALS — HEIGHT: 64 IN | WEIGHT: 150 LBS | BODY MASS INDEX: 25.61 KG/M2

## 2024-04-29 DIAGNOSIS — M25.511 PAIN IN RIGHT SHOULDER: ICD-10-CM

## 2024-04-29 PROCEDURE — 72040 X-RAY EXAM NECK SPINE 2-3 VW: CPT

## 2024-04-29 PROCEDURE — 99024 POSTOP FOLLOW-UP VISIT: CPT

## 2024-04-29 RX ORDER — DICLOFENAC SODIUM 50 MG/1
50 TABLET, DELAYED RELEASE ORAL
Qty: 28 | Refills: 0 | Status: ACTIVE | COMMUNITY
Start: 2024-04-29 | End: 1900-01-01

## 2024-04-29 NOTE — PHYSICAL EXAM
[de-identified] : Cervical Physical Exam  Gait - Normal  Station - Normal  Sagittal balance - Normal  Compensatory mechanism? - None  Horizontal gaze - Maintained   Reflexes  Biceps - Normal  Triceps - Normal  Brachioradialis - Normal  Patellar - Normal  Gastroc - Normal  Clonus -No  Hoffmans - None  Shoulder exam-ROM in bilateral shoulders improved  Spurling's - None  Wrist Pulses -2+ radial/ulnar  Foot Pulses -2+ DP/PT  Cervical range of motion - Neck brace   Sensation  C5-T1 sensation intact to light touch bilaterally  L1-S1 sensation intact to light touch bilaterally  Motor  Deltoid Bicep Triceps WF WE IO  Right 4/5 4/5 5/5 5/5 5/5 5/5 5/5 Left 5/5 5/5 5/5 5/5 5/5 5/5 5/5   IP Quad HS TA Gastroc EHL Right 5/5 5/5 5/5 5/5 5/5 5/5 Left 5/5 5/5 5/5 5/5 5/5 5/5  Incision healed  Her  strength and IO strength are both 5 out of 5 in her upper extremities  [de-identified] : Shoulder radiographs reviewed No fracture noted Glenohumeral joint reduced Significant AC arthritis bilaterally  Cervical radiographs Hardware in appropriate position No acute complication

## 2024-04-29 NOTE — HISTORY OF PRESENT ILLNESS
[de-identified] : Overall the patient states that she is doing well.  She feels her pain is adequately controlled.  She does state in the morning she does have issues with hand movements.  She feels like her hand dexterity is worse in the morning but by mid afternoon her hand movements are much better.  She does feel like the increase in gabapentin has helped her.  She denies any bowel bladder issues.  She denies any saddle anesthesia.  She is walking well.  She states that she 2-3 times a week she walks long distances with her .  She is anxious to find out when she can start weaning down her collar as well.  4/2024 Today the patient is approximately 4 weeks out from her posterior cervical decompression fusion procedure.  She feels that she has had an acute exacerbation of left shoulder pain.  She denies any bowel bladder issues.  She denies any saddle anesthesia.  She is making progress in terms of her ambulatory status.  She feels like she has decreased range of motion in her left shoulder.  She is here for evaluation and treatment.  04/01/24 Patient is a 63-year-old female who presents for f/u eval s/p 3 weeks C3-C7 decompression fusion 03.09. Patient was discharged from rehab on 03.29. Patient details shooting pain down her back and LT shoulder pain. Wearing collar for support. Took 2 pills of Oxycodone since she has been home. Taking hydro morphine as prescribed. Persistent paresthesia in RT hand.

## 2024-04-29 NOTE — ASSESSMENT
[FreeTextEntry1] : Overall I am pleased with the patient's progress.  She will continue working with physical therapy.  We will work on weaning down her brace.  She can take it off when she is sleeping and when she is eating.  I will see her back in 4 weeks.  I encouraged her to reach out to me at any point if her symptoms worsen or change in any way.  Overall however I think her progress is phenomenal given the extent and complexity of her injury.  The patient was in agreement with this.  We will also increase her gabapentin as I think this will help with some of her neuropathic symptoms.

## 2024-05-06 ENCOUNTER — APPOINTMENT (OUTPATIENT)
Dept: ORTHOPEDIC SURGERY | Facility: CLINIC | Age: 64
End: 2024-05-06

## 2024-05-07 NOTE — PHYSICAL EXAM
[de-identified] : Cervical Physical Exam  Gait - Normal  Station - Normal  Sagittal balance - Normal  Compensatory mechanism? - None  Horizontal gaze - Maintained   Reflexes  Biceps - Normal  Triceps - Normal  Brachioradialis - Normal  Patellar - Normal  Gastroc - Normal  Clonus -No  Hoffmans - None  Shoulder exam-ROM in bilateral shoulders improved  Spurling's - None  Wrist Pulses -2+ radial/ulnar  Foot Pulses -2+ DP/PT  Cervical range of motion - Neck brace   Sensation  C5-T1 sensation intact to light touch bilaterally  L1-S1 sensation intact to light touch bilaterally  Motor  Deltoid Bicep Triceps WF WE IO  Right 4/5 4/5 5/5 5/5 5/5 5/5 5/5 Left 5/5 5/5 5/5 5/5 5/5 5/5 5/5   IP Quad HS TA Gastroc EHL Right 5/5 5/5 5/5 5/5 5/5 5/5 Left 5/5 5/5 5/5 5/5 5/5 5/5  Incision healed  Her  strength and IO strength are both 5 out of 5 in her upper extremities

## 2024-05-20 ENCOUNTER — APPOINTMENT (OUTPATIENT)
Dept: ORTHOPEDIC SURGERY | Facility: CLINIC | Age: 64
End: 2024-05-20
Payer: COMMERCIAL

## 2024-05-20 VITALS
DIASTOLIC BLOOD PRESSURE: 70 MMHG | OXYGEN SATURATION: 98 % | WEIGHT: 143 LBS | SYSTOLIC BLOOD PRESSURE: 102 MMHG | BODY MASS INDEX: 24.41 KG/M2 | HEART RATE: 90 BPM | HEIGHT: 64 IN

## 2024-05-20 PROCEDURE — 99024 POSTOP FOLLOW-UP VISIT: CPT

## 2024-05-20 PROCEDURE — 72040 X-RAY EXAM NECK SPINE 2-3 VW: CPT

## 2024-05-20 NOTE — ADDENDUM
[FreeTextEntry1] :  I, Feli Agosto, acted solely as a scribe for Dr. Pasha Layton MD on this date 05/20/2024   All medical record entries made by the Scribe were at my, Dr. Pasha Layton MD., direction and personally dictated by me on 05/20/2024. I have reviewed the chart and agree that the record accurately reflects my personal performance of the history, physical exam, assessment and plan. I have also personally directed, reviewed, and agreed with the chart.

## 2024-05-20 NOTE — ASSESSMENT
[FreeTextEntry1] : This is a 63year old F s/p 9.5 weeks posterior cervical decompression. Overall, I am pleased with the progress she has made thus far.  There are no red flag findings on imaging nor are there any red flag findings on clinical exams. Therefore, we will proceed with a course of conservative treatment. This would include physical therapy/home exercise program, Tylenol, NSAIDs as medically indicated. Patient may remove cervical collar and use it as needed.  The patient will follow up with me in approximately 6 weeks. I encouraged the patient to follow-up sooner if there are any new or worsening symptoms.

## 2024-05-20 NOTE — HISTORY OF PRESENT ILLNESS
[de-identified] : Patient is a 63 year old female who presents for f/u eval s/p 9.5 weeks posterior cervical decompression. Patient states overall, she is doing better. Mild shoulder pain. In PT. Persistent pins/needles in hands and difficulty gripping objects.    04.29.24 Overall the patient states that she is doing well.  She feels her pain is adequately controlled.  She does state in the morning she does have issues with hand movements.  She feels like her hand dexterity is worse in the morning but by mid afternoon her hand movements are much better.  She does feel like the increase in gabapentin has helped her.  She denies any bowel bladder issues.  She denies any saddle anesthesia.  She is walking well.  She states that she 2-3 times a week she walks long distances with her .  She is anxious to find out when she can start weaning down her collar as well.  4/2024 Today the patient is approximately 4 weeks out from her posterior cervical decompression fusion procedure.  She feels that she has had an acute exacerbation of left shoulder pain.  She denies any bowel bladder issues.  She denies any saddle anesthesia.  She is making progress in terms of her ambulatory status.  She feels like she has decreased range of motion in her left shoulder.  She is here for evaluation and treatment.  04/01/24 Patient is a 63-year-old female who presents for f/u eval s/p 3 weeks C3-C7 decompression fusion 03.09. Patient was discharged from rehab on 03.29. Patient details shooting pain down her back and LT shoulder pain. Wearing collar for support. Took 2 pills of Oxycodone since she has been home. Taking hydro morphine as prescribed. Persistent paresthesia in RT hand.

## 2024-05-20 NOTE — PHYSICAL EXAM
[de-identified] : Cervical Physical Exam  Gait - Normal  Station - Normal  Sagittal balance - Normal  Compensatory mechanism? - None  Horizontal gaze - Maintained   Reflexes  Biceps - Normal  Triceps - Normal  Brachioradialis - Normal  Patellar - Normal  Gastroc - Normal  Clonus -No  Hoffmans - None  Shoulder exam-ROM in bilateral shoulders improved  Spurling's - None  Wrist Pulses -2+ radial/ulnar  Foot Pulses -2+ DP/PT  Cervical range of motion - Neck brace   Sensation  C5-T1 sensation intact to light touch bilaterally  L1-S1 sensation intact to light touch bilaterally  Motor  Deltoid Bicep Triceps WF WE IO  Right 5/5 5/5 5/5 5/5 5/5 5/5 5/5 Left 5/5 5/5 5/5 5/5 5/5 5/5 5/5   IP Quad HS TA Gastroc EHL Right 5/5 5/5 5/5 5/5 5/5 5/5 Left 5/5 5/5 5/5 5/5 5/5 5/5  Incision healed  Her  strength and IO strength are both 5 out of 5 in her upper extremities  [de-identified] : Shoulder radiographs reviewed No fracture noted Glenohumeral joint reduced Significant AC arthritis bilaterally  Cervical radiographs Hardware in appropriate position No acute complication

## 2024-06-28 NOTE — DIETITIAN INITIAL EVALUATION ADULT - NSICDXPASTSURGICALHX_GEN_ALL_CORE_FT
Subjective:     CC:  There were no encounter diagnoses.    HISTORY OF THE PRESENT ILLNESS: Patient is a 75 y.o. female. This pleasant patient is here today to establish care. His/her prior PCP was Dr Almeida.    Compression fracture of T12 vertebra with delayed healing, degeneration of intervertebral lumbar disc, paraspinal abscess  OM of the left femur s/p treatment with IV Invanz followed by po Augmentin had an MRI of back showing 6 x 2 x 5.5 cm in the left posterior paravertebral subcutaneous tissue at L4-5 and S1 associated with difficulty walking and leg weakness was admitted on 5/9/2024.   -Patient has completed IV antibiotics  -Has follow up with PT for back pain    Hypotension  -Normalized on recheck, /70    Loop recorder in place    Health Maintenance     - Dyslipidemia (30-45): UTD  - Diabetes (HTN, HLD, BMI >25): UTD  - Depression screening (PHQ-2 and/or PHQ-9): neg  - Dental: UTD  - Eye: UTD  Diet: regular  Exercise: sedentary  Substance Use: MJ usage  Tobacco Use/counseling: denies  Safe in relationship: yes       Cancer screening  - Colon CA (45-75) - FIT (annual) cspy (q10yr): UTD  - Breast CA: mammo (required 50-73yo) or starting 40 (ACOG, ACR), 45 (ACS), 50 (USPTF): ordered  - Skin cancer screening:      Infectious disease screening/Immunizations  --STI/HIV Screening: prev completed  --Hepatitis C Screening (18 to 80 yo): prev completed  --Immunizations: declines shingles    Current Outpatient Medications Ordered in Epic   Medication Sig Dispense Refill    tizanidine (ZANAFLEX) 2 MG tablet TAKE 1 TABLET BY MOUTH THREE TIMES A  Tablet 1    clopidogrel (PLAVIX) 75 MG Tab Take 1 Tablet by mouth every day. 30 Tablet 3    oxyCODONE immediate-release (ROXICODONE) 5 MG Tab Take 5 mg by mouth every evening as needed for Severe Pain.      DULoxetine (CYMBALTA) 60 MG Cap DR Particles delayed-release capsule Take 1 Capsule by mouth 2 times a day for 90 days. 180 Capsule 0    lamotrigine (LAMICTAL)  "200 MG tablet Take 1 Tablet by mouth every evening for 90 days. 90 Tablet 0    quetiapine (SEROQUEL) 300 MG tablet Take 1 Tablet by mouth every evening for 90 days. 90 Tablet 0    atorvastatin (LIPITOR) 80 MG tablet TAKE 1 TABLET BY MOUTH EVERY DAY 90 Tablet 3    lisinopril (PRINIVIL) 5 MG Tab Take 1 Tablet by mouth every day. 90 Tablet 3    alendronate (FOSAMAX) 70 MG Tab TAKE 1 TABLET BY MOUTH ONE TIME PER WEEK (Patient taking differently: Take 70 mg by mouth every 7 days. On Sunday) 12 Tablet 3    ACETAMINOPHEN PO Take 2 Tablets by mouth every 6 hours as needed for Mild Pain or Moderate Pain.      methocarbamol (ROBAXIN) 500 MG Tab Take 1,000 mg by mouth 3 times a day.       No current [facility]-ordered facility-administered medications on file.         ROS:   Gen: no fevers/chills, no changes in weight  Eyes: no changes in vision  ENT: no sore throat, no hearing loss, no bloody nose  Pulm: no sob, no cough  CV: no chest pain, no palpitations  GI: no nausea/vomiting, no diarrhea  : no dysuria  MSk: no myalgias  Skin: no rash  Neuro: no headaches, no numbness/tingling  Heme/Lymph: no easy bruising      Objective:     Exam: /60 (BP Location: Left arm, Patient Position: Sitting, BP Cuff Size: Adult)   Pulse 85   Temp 36.1 °C (96.9 °F) (Temporal)   Resp 16   Ht 1.676 m (5' 6\")   Wt 74.3 kg (163 lb 12.8 oz)   SpO2 97%  Body mass index is 26.44 kg/m².    General: Normal appearing. No distress.  HEENT: Normocephalic. Eyes conjunctiva clear lids without ptosis, pupils equal and reactive to light accommodation, ears normal shape and contour, canals are clear bilaterally, tympanic membranes are benign, nasal mucosa benign, oropharynx is without erythema, edema or exudates.   Neck: Supple without JVD or bruit. Thyroid is not enlarged.  Pulmonary: Clear to ausculation.  Normal effort. No rales, ronchi, or wheezing.  Cardiovascular: Regular rate and rhythm without murmur. Carotid and radial pulses are intact and " equal bilaterally.  Abdomen: Soft, nontender, nondistended. Normal bowel sounds. Liver and spleen are not palpable  Neurologic: Grossly nonfocal  Lymph: No cervical or supraclavicular lymph nodes are palpable  Skin: Warm and dry.  No obvious lesions.  Musculoskeletal: Normal gait. No extremity cyanosis, clubbing, or edema.  Psych: Normal mood and affect. Alert and oriented x3. Judgment and insight is normal.      Assessment & Plan:   75 y.o. female with the following -    1. Encounter to establish care  Labs per orders  Vaccinations per orders  Screenings per orders      2. Spinal abscess (HCC)  Acute condition, resolved  Patient has completed outpatient antibiotics.  She does still have some lingering pain from the drainage of the abscess.  Patient does also have chronic back pain at baseline secondary to compression fracture, degenerative disc disease  Continue follow-up with ID as scheduled  Continue follow-up with physical therapy as scheduled    3. Cerebrovascular accident (CVA) due to embolism of left anterior cerebral artery (HCC)  Currently on maximal therapy of statin as well as Plavix.  Blood pressure at goal today in clinic.  It was originally slightly low.  Show normalized on recheck at 110/70.  Patient does have some left-sided spasticity which is at her current baseline.  Continue current medications    4. Encounter for screening mammogram for malignant neoplasm of breast  - MA-SCREENING MAMMO BILAT W/TOMOSYNTHESIS W/CAD; Future    5. Skin cancer screening  - Referral to Dermatology      I spent a total of 35 minutes with record review, exam, communication with the patient, communication with other providers, and documentation of this encounter.    No follow-ups on file.    Please note that this dictation was created using voice recognition software. I have made every reasonable attempt to correct obvious errors, but I expect that there are errors of grammar and possibly content that I did not discover  before finalizing the note.   PAST SURGICAL HISTORY:  H/O gastric bypass     Personal History of Gastric Bypass     S/P Cholecystectomy     S/P Hernia Repair ventral hernia

## 2024-06-30 ENCOUNTER — NON-APPOINTMENT (OUTPATIENT)
Age: 64
End: 2024-06-30

## 2024-07-01 ENCOUNTER — APPOINTMENT (OUTPATIENT)
Dept: ORTHOPEDIC SURGERY | Facility: CLINIC | Age: 64
End: 2024-07-01
Payer: COMMERCIAL

## 2024-07-01 VITALS — WEIGHT: 134 LBS | HEIGHT: 64 IN | BODY MASS INDEX: 22.88 KG/M2

## 2024-07-01 DIAGNOSIS — M54.2 CERVICALGIA: ICD-10-CM

## 2024-07-01 PROCEDURE — 99214 OFFICE O/P EST MOD 30 MIN: CPT

## 2024-07-01 PROCEDURE — 72050 X-RAY EXAM NECK SPINE 4/5VWS: CPT

## 2024-07-01 RX ORDER — TRAMADOL HYDROCHLORIDE 50 MG/1
50 TABLET, COATED ORAL
Qty: 20 | Refills: 0 | Status: ACTIVE | COMMUNITY
Start: 2024-07-01 | End: 1900-01-01

## 2024-08-12 ENCOUNTER — APPOINTMENT (OUTPATIENT)
Dept: ORTHOPEDIC SURGERY | Facility: CLINIC | Age: 64
End: 2024-08-12
Payer: COMMERCIAL

## 2024-08-12 VITALS
BODY MASS INDEX: 22.88 KG/M2 | SYSTOLIC BLOOD PRESSURE: 100 MMHG | HEIGHT: 64 IN | WEIGHT: 134 LBS | DIASTOLIC BLOOD PRESSURE: 61 MMHG

## 2024-08-12 DIAGNOSIS — M54.2 CERVICALGIA: ICD-10-CM

## 2024-08-12 DIAGNOSIS — M25.511 PAIN IN RIGHT SHOULDER: ICD-10-CM

## 2024-08-12 PROCEDURE — 99214 OFFICE O/P EST MOD 30 MIN: CPT

## 2024-08-12 NOTE — ASSESSMENT
[FreeTextEntry1] : This is a 63 year F that is now 5 months out from surgery. Risks Benefits discussed regarding surgery. We will continue with conservative treatment including physical therapy/home exercise program, Tylenol, NSAIDs as medically indicated. The patient will follow up with me in approximately 4 weeks. I encouraged the patient to follow-up sooner if there are any new or worsening symptoms.

## 2024-08-12 NOTE — ADDENDUM
[FreeTextEntry1] :  I, Feli Agosto, acted solely as a scribe for Dr. Pasha Layton MD on this date 08/12/2024   All medical record entries made by the Scribe were at my, Dr. Pasha Layton MD., direction and personally dictated by me on 08/12/2024. I have reviewed the chart and agree that the record accurately reflects my personal performance of the history, physical exam, assessment and plan. I have also personally directed, reviewed, and agreed with the chart.

## 2024-08-12 NOTE — HISTORY OF PRESENT ILLNESS
[de-identified] : Patient is a 63-year-old female who presents for f/u eval s/p 5 months C2-C7 posterior cervical decompression, C3-C7 posterolateral fusion. Today she details her head is heavy and experiences discomfort with head posture. Reports pulling and burning in her back. States she has plateaued with PT. She has reduced amount of Gabapentin.    07.01.24 Patient is a 63-year-old female who presents for f/u eval s/p 3.5 months posterior cervical decompression. Today patient details changes in balance exacerbated with certain movements. Reports frequent dizzy spells. Continuing with PT 2x a week. Able to drive. Using lidocaine patches for pain relief.    05.20.24 Patient is a 63 year old female who presents for f/u eval s/p 9.5 weeks posterior cervical decompression. Patient states overall, she is doing better. Mild shoulder pain. In PT. Persistent pins/needles in hands and difficulty gripping objects.    04.29.24 Overall the patient states that she is doing well.  She feels her pain is adequately controlled.  She does state in the morning she does have issues with hand movements.  She feels like her hand dexterity is worse in the morning but by mid afternoon her hand movements are much better.  She does feel like the increase in gabapentin has helped her.  She denies any bowel bladder issues.  She denies any saddle anesthesia.  She is walking well.  She states that she 2-3 times a week she walks long distances with her .  She is anxious to find out when she can start weaning down her collar as well.  4/2024 Today the patient is approximately 4 weeks out from her posterior cervical decompression fusion procedure.  She feels that she has had an acute exacerbation of left shoulder pain.  She denies any bowel bladder issues.  She denies any saddle anesthesia.  She is making progress in terms of her ambulatory status.  She feels like she has decreased range of motion in her left shoulder.  She is here for evaluation and treatment.  04/01/24 Patient is a 63-year-old female who presents for f/u eval s/p 3 weeks C3-C7 decompression fusion 03.09. Patient was discharged from rehab on 03.29. Patient details shooting pain down her back and LT shoulder pain. Wearing collar for support. Took 2 pills of Oxycodone since she has been home. Taking hydro morphine as prescribed. Persistent paresthesia in RT hand.

## 2024-08-12 NOTE — PHYSICAL EXAM
[de-identified] : Cervical Physical Exam  Gait - Normal  Station - Normal  Sagittal balance - Normal  Compensatory mechanism? - None  Horizontal gaze - Maintained   Reflexes  Biceps - Normal  Triceps - Normal  Brachioradialis - Normal  Patellar - Normal  Gastroc - Normal  Clonus -No  Hoffmans - None  Shoulder exam-Normal   Spurling's - None  Wrist Pulses -2+ radial/ulnar  Foot Pulses -2+ DP/PT  Cervical range of motion - Neck brace   Sensation  C5-T1 sensation intact to light touch bilaterally  L1-S1 sensation intact to light touch bilaterally  Motor  Deltoid Bicep Triceps WF WE IO  Right 5/5 5/5 5/5 5/5 5/5 5/5 5/5 Left 5/5 5/5 5/5 5/5 5/5 5/5 5/5   IP Quad HS TA Gastroc EHL Right 5/5 5/5 5/5 5/5 5/5 5/5 Left 5/5 5/5 5/5 5/5 5/5 5/5 [de-identified] : Shoulder radiographs reviewed No fracture noted Glenohumeral joint reduced Significant AC arthritis bilaterally  Cervical radiographs Hardware in appropriate position No acute complication

## 2024-09-04 ENCOUNTER — APPOINTMENT (OUTPATIENT)
Dept: ORTHOPEDIC SURGERY | Facility: CLINIC | Age: 64
End: 2024-09-04

## 2024-09-07 PROBLEM — M54.50 LUMBAR BACK PAIN: Status: ACTIVE | Noted: 2020-12-21

## 2024-09-09 ENCOUNTER — APPOINTMENT (OUTPATIENT)
Dept: ORTHOPEDIC SURGERY | Facility: CLINIC | Age: 64
End: 2024-09-09
Payer: COMMERCIAL

## 2024-09-09 DIAGNOSIS — M54.50 LOW BACK PAIN, UNSPECIFIED: ICD-10-CM

## 2024-09-09 PROCEDURE — 72050 X-RAY EXAM NECK SPINE 4/5VWS: CPT

## 2024-09-09 PROCEDURE — 99214 OFFICE O/P EST MOD 30 MIN: CPT

## 2024-09-09 NOTE — PHYSICAL EXAM
[de-identified] : Cervical Physical Exam  Gait - Normal  Station - Normal  Sagittal balance - Normal  Compensatory mechanism? - None  Horizontal gaze - Maintained   Reflexes  Biceps - Normal  Triceps - Normal  Brachioradialis - Normal  Patellar - Normal  Gastroc - Normal  Clonus -No  Hoffmans - None  Shoulder exam-Normal   Spurling's - None  Wrist Pulses -2+ radial/ulnar  Foot Pulses -2+ DP/PT  Cervical range of motion - Neck brace   Sensation  C5-T1 sensation intact to light touch bilaterally  L1-S1 sensation intact to light touch bilaterally  Motor  Deltoid Bicep Triceps WF WE IO  Right 5/5 5/5 5/5 5/5 5/5 5/5 5/5 Left 5/5 5/5 5/5 5/5 5/5 5/5 5/5   IP Quad HS TA Gastroc EHL Right 5/5 5/5 5/5 5/5 5/5 5/5 Left 5/5 5/5 5/5 5/5 5/5 5/5 [de-identified] : previous imaging Shoulder radiographs reviewed No fracture noted Glenohumeral joint reduced Significant AC arthritis bilaterally  Cervical radiographs Hardware in appropriate position No acute complication No motion on flexion-extension No obvious subjacent or a adjacent segment disease

## 2024-09-09 NOTE — PHYSICAL EXAM
[de-identified] : Cervical Physical Exam  Gait - Normal  Station - Normal  Sagittal balance - Normal  Compensatory mechanism? - None  Horizontal gaze - Maintained   Reflexes  Biceps - Normal  Triceps - Normal  Brachioradialis - Normal  Patellar - Normal  Gastroc - Normal  Clonus -No  Hoffmans - None  Shoulder exam-Normal   Spurling's - None  Wrist Pulses -2+ radial/ulnar  Foot Pulses -2+ DP/PT  Cervical range of motion - Neck brace   Sensation  C5-T1 sensation intact to light touch bilaterally  L1-S1 sensation intact to light touch bilaterally  Motor  Deltoid Bicep Triceps WF WE IO  Right 5/5 5/5 5/5 5/5 5/5 5/5 5/5 Left 5/5 5/5 5/5 5/5 5/5 5/5 5/5   IP Quad HS TA Gastroc EHL Right 5/5 5/5 5/5 5/5 5/5 5/5 Left 5/5 5/5 5/5 5/5 5/5 5/5 [de-identified] : previous imaging Shoulder radiographs reviewed No fracture noted Glenohumeral joint reduced Significant AC arthritis bilaterally  Cervical radiographs Hardware in appropriate position No acute complication No motion on flexion-extension No obvious subjacent or a adjacent segment disease

## 2024-09-09 NOTE — HISTORY OF PRESENT ILLNESS
[de-identified] : 64 yo female following up 6 months, dos 3/9/2024, from C2-C7 posterior cervical decompression, C3-C7 posterolateral fusion for C6 fracture.  Today the patient states that she went back to work although she is having issues working a full 8-hour day because of the significant dysfunction in her hands.  She feels strong but she does note that she has numbness in her bilateral hands.  She denies any bowel bladder issues.  She denies any new issues with balance or hand dexterity.  8/12/2024 Patient is a 63-year-old female who presents for f/u eval s/p 5 months C2-C7 posterior cervical decompression, C3-C7 posterolateral fusion. Today she details her head is heavy and experiences discomfort with head posture. Reports pulling and burning in her back. States she has plateaued with PT. She has reduced amount of Gabapentin.    07.01.24 Patient is a 63-year-old female who presents for f/u eval s/p 3.5 months posterior cervical decompression. Today patient details changes in balance exacerbated with certain movements. Reports frequent dizzy spells. Continuing with PT 2x a week. Able to drive. Using lidocaine patches for pain relief.    05.20.24 Patient is a 63 year old female who presents for f/u eval s/p 9.5 weeks posterior cervical decompression. Patient states overall, she is doing better. Mild shoulder pain. In PT. Persistent pins/needles in hands and difficulty gripping objects.    04.29.24 Overall the patient states that she is doing well.  She feels her pain is adequately controlled.  She does state in the morning she does have issues with hand movements.  She feels like her hand dexterity is worse in the morning but by mid afternoon her hand movements are much better.  She does feel like the increase in gabapentin has helped her.  She denies any bowel bladder issues.  She denies any saddle anesthesia.  She is walking well.  She states that she 2-3 times a week she walks long distances with her .  She is anxious to find out when she can start weaning down her collar as well.  4/2024 Today the patient is approximately 4 weeks out from her posterior cervical decompression fusion procedure.  She feels that she has had an acute exacerbation of left shoulder pain.  She denies any bowel bladder issues.  She denies any saddle anesthesia.  She is making progress in terms of her ambulatory status.  She feels like she has decreased range of motion in her left shoulder.  She is here for evaluation and treatment.  04/01/24 Patient is a 63-year-old female who presents for f/u eval s/p 3 weeks C3-C7 decompression fusion 03.09. Patient was discharged from rehab on 03.29. Patient details shooting pain down her back and LT shoulder pain. Wearing collar for support. Took 2 pills of Oxycodone since she has been home. Taking hydro morphine as prescribed. Persistent paresthesia in RT hand.

## 2024-09-09 NOTE — ASSESSMENT
[FreeTextEntry1] : I would like to get an MRI of the patient's neck.  She is still dealing with some fairly substantial numbness in her bilateral upper extremities.  Given the extent of her injury I think it is worthwhile to evaluate and treat this.  The first step would be to obtain an MRI to ensure no residual areas of spinal cord compression.  I will have her follow-up in 2 to 3 weeks.  All questions were answered.

## 2024-09-09 NOTE — HISTORY OF PRESENT ILLNESS
[de-identified] : 62 yo female following up 6 months, dos 3/9/2024, from C2-C7 posterior cervical decompression, C3-C7 posterolateral fusion for C6 fracture.  Today the patient states that she went back to work although she is having issues working a full 8-hour day because of the significant dysfunction in her hands.  She feels strong but she does note that she has numbness in her bilateral hands.  She denies any bowel bladder issues.  She denies any new issues with balance or hand dexterity.  8/12/2024 Patient is a 63-year-old female who presents for f/u eval s/p 5 months C2-C7 posterior cervical decompression, C3-C7 posterolateral fusion. Today she details her head is heavy and experiences discomfort with head posture. Reports pulling and burning in her back. States she has plateaued with PT. She has reduced amount of Gabapentin.    07.01.24 Patient is a 63-year-old female who presents for f/u eval s/p 3.5 months posterior cervical decompression. Today patient details changes in balance exacerbated with certain movements. Reports frequent dizzy spells. Continuing with PT 2x a week. Able to drive. Using lidocaine patches for pain relief.    05.20.24 Patient is a 63 year old female who presents for f/u eval s/p 9.5 weeks posterior cervical decompression. Patient states overall, she is doing better. Mild shoulder pain. In PT. Persistent pins/needles in hands and difficulty gripping objects.    04.29.24 Overall the patient states that she is doing well.  She feels her pain is adequately controlled.  She does state in the morning she does have issues with hand movements.  She feels like her hand dexterity is worse in the morning but by mid afternoon her hand movements are much better.  She does feel like the increase in gabapentin has helped her.  She denies any bowel bladder issues.  She denies any saddle anesthesia.  She is walking well.  She states that she 2-3 times a week she walks long distances with her .  She is anxious to find out when she can start weaning down her collar as well.  4/2024 Today the patient is approximately 4 weeks out from her posterior cervical decompression fusion procedure.  She feels that she has had an acute exacerbation of left shoulder pain.  She denies any bowel bladder issues.  She denies any saddle anesthesia.  She is making progress in terms of her ambulatory status.  She feels like she has decreased range of motion in her left shoulder.  She is here for evaluation and treatment.  04/01/24 Patient is a 63-year-old female who presents for f/u eval s/p 3 weeks C3-C7 decompression fusion 03.09. Patient was discharged from rehab on 03.29. Patient details shooting pain down her back and LT shoulder pain. Wearing collar for support. Took 2 pills of Oxycodone since she has been home. Taking hydro morphine as prescribed. Persistent paresthesia in RT hand.

## 2024-09-17 ENCOUNTER — APPOINTMENT (OUTPATIENT)
Dept: MRI IMAGING | Facility: CLINIC | Age: 64
End: 2024-09-17
Payer: COMMERCIAL

## 2024-09-17 ENCOUNTER — OUTPATIENT (OUTPATIENT)
Dept: OUTPATIENT SERVICES | Facility: HOSPITAL | Age: 64
LOS: 1 days | End: 2024-09-17
Payer: COMMERCIAL

## 2024-09-17 DIAGNOSIS — M54.2 CERVICALGIA: ICD-10-CM

## 2024-09-17 DIAGNOSIS — Z98.84 BARIATRIC SURGERY STATUS: Chronic | ICD-10-CM

## 2024-09-17 DIAGNOSIS — Z00.8 ENCOUNTER FOR OTHER GENERAL EXAMINATION: ICD-10-CM

## 2024-09-17 PROCEDURE — 72141 MRI NECK SPINE W/O DYE: CPT

## 2024-09-17 PROCEDURE — 72141 MRI NECK SPINE W/O DYE: CPT | Mod: 26

## 2024-09-23 ENCOUNTER — APPOINTMENT (OUTPATIENT)
Dept: ORTHOPEDIC SURGERY | Facility: CLINIC | Age: 64
End: 2024-09-23
Payer: COMMERCIAL

## 2024-09-23 DIAGNOSIS — M54.2 CERVICALGIA: ICD-10-CM

## 2024-09-23 PROCEDURE — 99214 OFFICE O/P EST MOD 30 MIN: CPT

## 2024-09-23 NOTE — ADDENDUM
[FreeTextEntry1] : I, Sabi Menard, acted solely as a scribe for Dr. Pasha Layton MD on this date 09/23/2024  All medical record entries made by the Scribe were at my, Dr. Pasha Layton MD., direction and personally dictated by me on 09/23/2024. I have reviewed the chart and agree that the record accurately reflects my personal performance of the history, physical exam, assessment and plan. I have also personally directed, reviewed, and agreed with the chart.

## 2024-09-23 NOTE — HISTORY OF PRESENT ILLNESS
[de-identified] : Overall the patient is doing well.  She has stable symptoms in terms of her hand and upper extremity symptoms.  She is doing her actives of daily living.  She is able to work although she does feel numbness in her bilateral hands.  She is here today to go over her MRI results.  9.23.24 Patient is a 64 year old female who presents for follow up evaluation of C2-C7 posterior cervical decompression. Denies any bowel/bladder incontinence. Denies any saddle anesthesia.   8/12/2024 Patient is a 63-year-old female who presents for f/u eval s/p 5 months C2-C7 posterior cervical decompression, C3-C7 posterolateral fusion. Today she details her head is heavy and experiences discomfort with head posture. Reports pulling and burning in her back. States she has plateaued with PT. She has reduced amount of Gabapentin.    07.01.24 Patient is a 63-year-old female who presents for f/u eval s/p 3.5 months posterior cervical decompression. Today patient details changes in balance exacerbated with certain movements. Reports frequent dizzy spells. Continuing with PT 2x a week. Able to drive. Using lidocaine patches for pain relief.    05.20.24 Patient is a 63 year old female who presents for f/u eval s/p 9.5 weeks posterior cervical decompression. Patient states overall, she is doing better. Mild shoulder pain. In PT. Persistent pins/needles in hands and difficulty gripping objects.    04.29.24 Overall the patient states that she is doing well.  She feels her pain is adequately controlled.  She does state in the morning she does have issues with hand movements.  She feels like her hand dexterity is worse in the morning but by mid afternoon her hand movements are much better.  She does feel like the increase in gabapentin has helped her.  She denies any bowel bladder issues.  She denies any saddle anesthesia.  She is walking well.  She states that she 2-3 times a week she walks long distances with her .  She is anxious to find out when she can start weaning down her collar as well.  4/2024 Today the patient is approximately 4 weeks out from her posterior cervical decompression fusion procedure.  She feels that she has had an acute exacerbation of left shoulder pain.  She denies any bowel bladder issues.  She denies any saddle anesthesia.  She is making progress in terms of her ambulatory status.  She feels like she has decreased range of motion in her left shoulder.  She is here for evaluation and treatment.  04/01/24 Patient is a 63-year-old female who presents for f/u eval s/p 3 weeks C3-C7 decompression fusion 03.09. Patient was discharged from rehab on 03.29. Patient details shooting pain down her back and LT shoulder pain. Wearing collar for support. Took 2 pills of Oxycodone since she has been home. Taking hydro morphine as prescribed. Persistent paresthesia in RT hand.

## 2024-09-23 NOTE — ASSESSMENT
[FreeTextEntry1] : This is a 64-year-old female that is approximately 6 months out status post fall with subsequent spinal cord injury.  MRI does not show any concerning findings in terms of stenosis.  At this point we will focus on physical therapy, activity modifications.  I will have her follow-up in 3 months.  I encouraged her to reach out to me at any time if her symptoms worsen or change in any way.

## 2024-09-23 NOTE — PHYSICAL EXAM
[de-identified] : Cervical Physical Exam  Gait - Normal  Station - Normal  Sagittal balance - Normal  Compensatory mechanism? - None  Horizontal gaze - Maintained   Reflexes  Biceps - Normal  Triceps - Normal  Brachioradialis - Normal  Patellar - Normal  Gastroc - Normal  Clonus -No  Hoffmans - None  Shoulder exam-Normal   Spurling's - None  Wrist Pulses -2+ radial/ulnar  Foot Pulses -2+ DP/PT  Cervical range of motion - Neck brace   Sensation  C5-T1 sensation intact to light touch bilaterally  L1-S1 sensation intact to light touch bilaterally  Motor  Deltoid Bicep Triceps WF WE IO  Right 5/5 5/5 5/5 5/5 5/5 5/5 5/5 Left 5/5 5/5 5/5 5/5 5/5 5/5 5/5   IP Quad HS TA Gastroc EHL Right 5/5 5/5 5/5 5/5 5/5 5/5 Left 5/5 5/5 5/5 5/5 5/5 5/5 [de-identified] : previous imaging Shoulder radiographs reviewed No fracture noted Glenohumeral joint reduced Significant AC arthritis bilaterally  Cervical radiographs Hardware in appropriate position No acute complication No motion on flexion-extension No obvious subjacent or a adjacent segment disease  Cervical MRI shows appropriate decompression of spinal cord without any residual areas of stenosis

## 2024-12-02 NOTE — H&P ADULT - HISTORY OF PRESENT ILLNESS
Fawad Tina Solorzanoalexandremikaela Patient Age: 72 year old  MESSAGE: Interpreting service used: No    Insurance on file confirmed with caller: Yes    IM/FP- Hospital/ER/Skilled Nursing Facility/Rehab Follow Up- Hospital          Date patient was admitted to the hospital: 11.26.2024    Date of discharge: 11.28.2024    Reason patient was in the hospital: Brain Surgery    Patient was seen at [facility]         Is the patient currently in the Hospital? No-     Is patient having new or worsening symptoms? No-       Appointment: Patient scheduled an appointment on 12.6.2024  at 2:20 p.m. with Dr TINSLEY  at [facility].       PCP Site: [place]-Route message to provider's clinical support pool HIGH PRIORITY        Needs clinical review for Transition Care Management (TCM).      Message read back to caller for accuracy: Yes       ALLERGIES:  Patient has no known allergies.  Current Outpatient Medications   Medication Sig Dispense Refill    aspirin 325 MG tablet Take 1 tablet by mouth daily. 30 tablet 1    clopidogrel (PLAVIX) 75 MG tablet Take 1 tablet by mouth daily. 30 tablet 0    psyllium (METAMUCIL MULTIHEALTH FIBER) 58.12 % packet for oral suspension Take 1 packet by mouth daily as needed (constipation).      Multiple Vitamins-Minerals (MULTIVITAMIN ADULTS PO) Take 1 tablet by mouth daily.      ALPRAZolam (XANAX) 0.5 MG tablet Take 1 tablet by mouth nightly as needed for Sleep or Anxiety. 30 tablet 0    zolpidem (AMBIEN) 10 MG tablet Take 1 tablet by mouth nightly as needed for Sleep. 30 tablet 0    traZODone (DESYREL) 50 MG tablet TAKE 2 TABLETS BY MOUTH EVERY NIGHT 180 tablet 3    escitalopram (LEXAPRO) 20 MG tablet TAKE 1 TABLET BY MOUTH DAILY 90 tablet 3    ascorbic acid (VITAMIN C) 1000 MG tablet Take 1,000 mg by mouth daily.      Cholecalciferol 25 mcg (1,000 units) capsule Take 25 mcg by mouth daily.       No current facility-administered medications for this visit.     PHARMACY to use: below           Pharmacy preference(s) on file:   [id number] - [place] - [address] AT [facility] [address]  [address] [address]  Phone: [phone] Fax: [phone]      CALL BACK INFO: Ok to leave response (including medical information) on answering machine      PCP: Rhianna Long DO         INS: Payor: AARP MEDICARE ADVANTAGE / Plan: AARP MEDICARE ADVANTAGE PPO / Product Type: MEDADV   PATIENT ADDRESS:  [address] [id number]     Ms. Dumont is a 63 year old woman with a PMhx of RYGB, hypertension, hyperlipidemia, depression, social EtOH use, who presented after syncope and fall. She lost consciousness without warning or without aura. This has never happened in the past. She woke up some time later (unsure how long) around 9pm but felt too weak to steady herself or get up. she lay on the carpeted floor from 9pm->7:30am before she regained strength to take herself downstairs to call for help. She reports bilateral upper extremity pain, mostly located in the shoulders, R thumb pain, upper neck pain. She has new onset bilateral upper extremity weakness, R>L She has remained A&O since the event. Workup demonstrated c5-6 cervical fractures, no other injuries. Patient admitted to [facility] on 3/8/24 for surgical management of a cervical spine injury following syncopal fall. CT of the cervical spine completed revealing acute anterior inferior endplate vertebral body fractures C5 and C6. MR cervical spine confirmed central cord syndrome with C3-6 severe canal stenosis. Following medical and cardiology clearance and optimization, patient taken to the OR and underwent C3-C7 decompression, posterior spinal fusion with muscle flap reconstruction on 3/9/2024 with Dr. Layton.   Plastic surgery consulted intraop to evaluate patient for muscle flap reconstruction of posterior spine wound given wide exposure of spine structures, need for bilateral multilevel hardware placement, use of bone graft requiring healthy vascularized muscle flap coverage of exposed vital structures and extensive foreign body. Patient tolerated procedure well. Patient was placed in hard cervical collar post op. Drain placed intraoperatively by plastics team, outputs monitored q shift. Drain pulled on 3/14/24 successfully. Patient transferred to the SICU post operatively for q1 neuro checks with eventual downgrade to the floor on 3/13/24. Patient placed on PCA for acute pain control, which was discontinued on 3/10/24 with transition to PO analgesics. CT of the wrist completed due to wrist pain to evaluate for scaphoid fx: negative for acute fx. Patient was evaluated postoperatively by physical and occupational therapists for weight bearing as tolerated ambulation in Cervical collar, and advised that patient would benefit from admission to rehab facility.   Physical medicine and rehab team evaluated and agreed with the recommendations. Discharge and Orthopedic Care instructions were delineated in the Discharge Plan and reviewed with the patient. All medications were delineated in the medication reconciliation tool and key points were reviewed with the patient. They were deemed stable from an Orthopedic & medical standpoint for discharge on 3/15/24 with no new neuro deficits.    Ms. Dumont is a 63 year old woman with a PMhx of RYGB, hypertension, hyperlipidemia, depression, social EtOH use, who presented after syncope and fall. She lost consciousness without warning or without aura. This has never happened in the past. She woke up some time later (unsure how long) around 9pm but felt too weak to steady herself or get up. She laid on the carpeted floor from 9pm->7:30am before she regained strength to take herself downstairs to call for help. She reports bilateral upper extremity pain, mostly located in the shoulders, R thumb pain, upper neck pain. She has new onset bilateral upper extremity weakness, R>L She has remained A&O since the event. Workup demonstrated c5-6 cervical fractures, no other injuries. Patient admitted to [facility] on 3/8/24 for surgical management of a cervical spine injury following syncopal fall. CT of the cervical spine completed revealing acute anterior inferior endplate vertebral body fractures C5 and C6. MR cervical spine confirmed central cord syndrome with C3-6 severe canal stenosis. Following medical and cardiology clearance and optimization, patient taken to the OR and underwent C3-C7 decompression, posterior spinal fusion with muscle flap reconstruction on 3/9/2024 with Dr. Layton.   Plastic surgery consulted intraop to evaluate patient for muscle flap reconstruction of posterior spine wound given wide exposure of spine structures, need for bilateral multilevel hardware placement, use of bone graft requiring healthy vascularized muscle flap coverage of exposed vital structures and extensive foreign body. Patient tolerated procedure well. Patient was placed in hard cervical collar post op. Drain placed intraoperatively by plastics team, outputs monitored q shift. Drain pulled on 3/14/24 successfully. Patient transferred to the SICU post operatively for q1 neuro checks with eventual downgrade to the floor on 3/13/24. Patient placed on PCA for acute pain control, which was discontinued on 3/10/24 with transition to PO analgesics. CT of the wrist completed due to wrist pain to evaluate for scaphoid fx: negative for acute fx. Patient was evaluated postoperatively by physical and occupational therapists for weight bearing as tolerated ambulation in Cervical collar, and advised that patient would benefit from admission to rehab facility.   Physical medicine and rehab team evaluated and agreed with the recommendations. Discharge and Orthopedic Care instructions were delineated in the Discharge Plan and reviewed with the patient. All medications were delineated in the medication reconciliation tool and key points were reviewed with the patient. They were deemed stable from an Orthopedic & medical standpoint for discharge on 3/15/24 with no new neuro deficits.    Mrs. Ai Dumont is a 63-year-old female patient with past medical history of RYGB (Anders-en-Y gastric bypass), hypertension, hyperlipidemia, depression, social EtOH use, who presented to the ED prior to admission after an episode of syncope and fall. She lost consciousness without warning nor with a preceding aura. No prior history of similar episodes. She regained consciousness some time later (unsure about duration) around 9pm but reported feeling too weak to steady herself or get up. She laid on the carpeted floor from 9PM through 7:30AM the following morning before she regained strength to take herself downstairs and call for help. She reported bilateral upper extremity pain, mostly located in the shoulders, right thumb pain, and upper neck pain. She had new onset bilateral upper extremity weakness more profound on the right side. She has since remained alert and oriented. Workup performed demonstrated C5-C6 cervical fractures with no other injuries. Patient was admitted to [facility] on 3/8/24 for surgical management of a cervical spine injury following syncopal fall. CT of the cervical spine reported acute anterior inferior endplate vertebral body fractures of C5 and C6 vertebrae. MR of cervical spine reported advanced multilevel degenerative changes contributing to high-grade multilevel central canal stenosis noting severe central canal stenosis and cord compression at C3-C4, C4-C5 and C5-C6. Following medical and cardiology clearance and optimization, patient was taken to the OR and underwent C3-C7 decompression, posterior spinal fusion with muscle flap reconstruction on 3/9/2024 with Dr. Layton. Plastic surgery consulted intraop to evaluate patient for muscle flap reconstruction of posterior spine wound given wide exposure of spine structures, need for bilateral multilevel hardware placement, use of bone graft requiring healthy vascularized muscle flap coverage of exposed vital structures and extensive foreign body. Patient tolerated procedure well. Patient was placed in hard cervical collar post op. Drain placed intraoperatively by Plastic Surgery team with outputs to be monitored q shift. Drain pulled on 3/14/24 successfully. Patient transferred to the SICU post operatively for q1 neuro checks with eventual downgrade to the floor on 3/13/24. Patient placed on PCA for acute pain control, which was discontinued on 3/10/24 with transition to PO analgesics. CT of the wrist completed due to wrist pain to evaluate for scaphoid fx: negative for acute fx. Patient was evaluated postoperatively by physical and occupational therapists for weight bearing as tolerated ambulation in cervical collar, and advised that patient would benefit from admission to rehab facility. Physical medicine and rehab team evaluated and agreed with the recommendations. Discharge and Orthopedic Care instructions were delineated in the Discharge Plan and reviewed with the patient per documentation. All medications were delineated in the medication reconciliation tool and key points were reviewed with the patient per documentation. Patient was deemed stable from an Orthopedic & medical standpoint at prior facility and was discharge on 3/16/24 with no new reported neuro deficits to be admitted at [facility].

## 2024-12-18 NOTE — PATIENT PROFILE ADULT - MONEY FOR FOOD
Detail Level: Generalized Detail Level: Zone Moisturizer Recommendations: Amlactin cream daily Detail Level: Detailed no

## (undated) DEVICE — SOL IRR POUR NS 0.9% 500ML

## (undated) DEVICE — SOL IRR POUR H2O 250ML

## (undated) DEVICE — ELCTR BOVIE PENCIL HANDPIECE ROCKER SWITCH 15FT

## (undated) DEVICE — DRAPE SPLIT SHEET 77" X 108"

## (undated) DEVICE — BIPOLAR FORCEP STRYKER STANDARD 8" X 0.5MM (YELLOW)

## (undated) DEVICE — WARMING BLANKET LOWER ADULT

## (undated) DEVICE — SUT VICRYL 2-0 18" CP-2 UNDYED (POP-OFF)

## (undated) DEVICE — VENODYNE/SCD SLEEVE CALF MEDIUM

## (undated) DEVICE — FOLEY TRAY 16FR 5CC LTX UMETER CLOSED

## (undated) DEVICE — DRSG STERISTRIPS 0.5 X 4"

## (undated) DEVICE — ELCTR BOVIE TIP BLADE INSULATED 6.5" EDGE

## (undated) DEVICE — POSITIONER FOAM HEADREST (PINK)

## (undated) DEVICE — WARMING BLANKET UPPER ADULT

## (undated) DEVICE — GLV 8.5 PROTEXIS (BLUE)

## (undated) DEVICE — DRILL BIT K2 MEDICAL 2.3MM

## (undated) DEVICE — FRAZIER SUCTION TIP 8FR

## (undated) DEVICE — DRSG TEGADERM 6"X8"

## (undated) DEVICE — DRSG DERMABOND 0.7ML

## (undated) DEVICE — SYR LUER LOK 20CC

## (undated) DEVICE — GLV 8.5 PROTEXIS (WHITE)

## (undated) DEVICE — PREP CHLORAPREP HI-LITE ORANGE 26ML

## (undated) DEVICE — SPECIMEN CONTAINER 100ML

## (undated) DEVICE — DRAPE EQUIPMENT COVER 27"

## (undated) DEVICE — MIDAS REX MR7 LUBRICANT DIFFUSER CARTRIDGE

## (undated) DEVICE — DRAPE C ARM C-ARMOUR

## (undated) DEVICE — POSITIONER FOAM EGG CRATE ULNAR 2PCS (PINK)

## (undated) DEVICE — SYR LUER LOK 10CC

## (undated) DEVICE — SUT VICRYL 0 18" OS-6 (POP-OFF)

## (undated) DEVICE — FRAZIER SUCTION TIP 12FR

## (undated) DEVICE — BIPOLAR FORCEP STRYKER STANDARD 7" X 0.5MM (YELLOW)

## (undated) DEVICE — SUCTION YANKAUER NO CONTROL VENT

## (undated) DEVICE — DRAPE SURGICAL #1010

## (undated) DEVICE — SUT MONOCRYL 3-0 18" PS-2 UNDYED

## (undated) DEVICE — SUT POLYSORB 0 36" GU-46

## (undated) DEVICE — MIDAS REX MR8 MATCH HEAD FLUTED LG BORE 3MM X 14CM

## (undated) DEVICE — DRAPE BACK TABLE COVER HEAVY DUTY 60"

## (undated) DEVICE — STRYKER BONE MILL BLADE MEDIUM 5.0MM

## (undated) DEVICE — NDL SPINAL 18G X 3.5" (PINK)